# Patient Record
Sex: MALE | Race: WHITE | NOT HISPANIC OR LATINO | Employment: OTHER | URBAN - METROPOLITAN AREA
[De-identification: names, ages, dates, MRNs, and addresses within clinical notes are randomized per-mention and may not be internally consistent; named-entity substitution may affect disease eponyms.]

---

## 2018-06-01 PROBLEM — M19.90 ARTHRITIS: Status: ACTIVE | Noted: 2018-06-01

## 2018-06-01 PROBLEM — R97.20 ABNORMAL PROSTATE SPECIFIC ANTIGEN: Status: ACTIVE | Noted: 2018-06-01

## 2018-06-01 PROBLEM — H20.10 CHRONIC ANTERIOR UVEITIS: Status: ACTIVE | Noted: 2018-06-01

## 2018-06-01 PROBLEM — R73.03 BORDERLINE DIABETES: Status: ACTIVE | Noted: 2018-06-01

## 2018-06-08 ENCOUNTER — OFFICE VISIT (OUTPATIENT)
Dept: SURGERY | Facility: CLINIC | Age: 83
End: 2018-06-08

## 2018-06-08 VITALS
OXYGEN SATURATION: 98 % | HEART RATE: 71 BPM | HEIGHT: 69 IN | DIASTOLIC BLOOD PRESSURE: 85 MMHG | SYSTOLIC BLOOD PRESSURE: 145 MMHG | BODY MASS INDEX: 22.22 KG/M2 | TEMPERATURE: 97.6 F | WEIGHT: 150 LBS

## 2018-06-08 DIAGNOSIS — Z86.010 HISTORY OF COLON POLYPS: ICD-10-CM

## 2018-06-08 DIAGNOSIS — R19.4 CHANGE IN BOWEL HABITS: Primary | ICD-10-CM

## 2018-06-08 PROCEDURE — 99204 OFFICE O/P NEW MOD 45 MIN: CPT | Performed by: COLON & RECTAL SURGERY

## 2018-06-08 RX ORDER — SODIUM CHLORIDE, SODIUM LACTATE, POTASSIUM CHLORIDE, CALCIUM CHLORIDE 600; 310; 30; 20 MG/100ML; MG/100ML; MG/100ML; MG/100ML
30 INJECTION, SOLUTION INTRAVENOUS CONTINUOUS
Status: CANCELLED | OUTPATIENT
Start: 2018-07-05

## 2018-06-08 NOTE — PROGRESS NOTES
Steffen Leyva is a 83 y.o. male who is seen as a consult for Bowel habits change.      HPI:    Pt states in the past year, he has had difficulty passing BMs  He feels that there is a growing obstruction in the lower part of his colon  Symptoms got bad a year ago  He had hard stools, and struggled to pass them  BMs were regular    Then he took 1 stool softener, which caused diarrhea  This resolved    For the past couple of months, he has been avoiding lactose  Takes lactaid if he knows he is going to have dairy  Bowel movements much better    He does not noted extruding anal tissue    No bright red bleeding  Occasional dark stools    He does not take any fiber supplements or probiotics    Prior Alejandre RBL 2006    Most recent colonoscopy 12/7/2005 Dr. Owen Wheatley: ascending tubular adenoma    Hx AFib: he states he avoids caffeine and sugar to manage symptoms  He has seen Iain Carey MD    Current PCP Gregorio Mejía in [place], [place]  He spends 5.5 months of the year in [place]    Past Medical History:   Diagnosis Date   • Arthritis    • Atrial fibrillation    • Cataract    • Corneal abrasion, left 07/23/2015    SEEN AT [facility] ER   • Elevated PSA 07/2015   • Glaucoma    • IGT (impaired glucose tolerance) 07/2015       Past Surgical History:   Procedure Laterality Date   • COLONOSCOPY N/A 1994    NO RECORDS, OLD CHART NOT AVAILABLE, DR. DAHIANA ALEJANDRE   • COLONOSCOPY N/A 12/07/2005    RIGHT COLON LESION, PATH: TUBULAR ADENOMA, SIGMOID DIVERTICULOSIS, DR. JENNY WHEATLEY AT [facility]   • EYE SURGERY Bilateral 2012    RELIEF OF ELEVATED INTRAOCULAR PRESSURE, PERFORMED IN [facility]   • HEMORRHOID BANDING N/A 01/19/2006    DR. DAHIANA ALEJANDRE   • HEMORRHOID BANDING N/A 1980    NO RECORD AVAILABLE   • KNEE ARTHROPLASTY Right 2013    D/T MENISCUS TEAR, DR. SANTACRUZ IN PT [place], [place]   • KNEE ARTHROSCOPY Left 1980       Social History:   reports that he has quit smoking. His smoking use included Cigarettes. He has a 29.00  pack-year smoking history. He has never used smokeless tobacco. He reports that he drinks alcohol. He reports that he does not use drugs.      Marriage status:     Family History   Problem Relation Age of Onset   • Heart disease Father    • Heart disease Brother    • Aortic aneurysm Mother    • Glaucoma Mother          Current Outpatient Prescriptions:   •  aspirin 81 MG tablet, Take 81 mg by mouth daily., Disp: , Rfl:   •  timolol (BETIMOL) 0.5 % ophthalmic solution, Apply  to eye 2 (two) times a day., Disp: , Rfl:     Allergy  Prednisolone    Review of Systems   HENT: Positive for hearing loss.    Respiratory: Positive for snoring.    Skin: Positive for skin cancer.   Musculoskeletal: Positive for arthritis.   Gastrointestinal: Positive for change in bowel habit.   Neurological: Positive for excessive daytime sleepiness.   All other systems reviewed and are negative.      Vitals:    06/08/18 0914   BP: 145/85   Pulse: 71   Temp: 97.6 °F (36.4 °C)   SpO2: 98%     Body mass index is 22.15 kg/m².    Physical Exam   Constitutional: He is oriented to person, place, and time. He appears well-developed and well-nourished. No distress.   HENT:   Head: Normocephalic and atraumatic.   Nose: Nose normal.   Mouth/Throat: Oropharynx is clear and moist.   Eyes: Conjunctivae and EOM are normal. Pupils are equal, round, and reactive to light.   Neck: Normal range of motion. No tracheal deviation present.   Pulmonary/Chest: Effort normal and breath sounds normal. No respiratory distress.   Abdominal: Soft. Bowel sounds are normal. He exhibits no distension.   Musculoskeletal: Normal range of motion. He exhibits no edema or deformity.   Neurological: He is alert and oriented to person, place, and time. No cranial nerve deficit. Coordination and gait normal.   Skin: Skin is warm and dry.   Psychiatric: He has a normal mood and affect. His behavior is normal. Judgment normal.         Assessment:  1. Change in bowel habits     2. History of colon polyps        Plan:      For the change in bowel habits and history of colon polyps, I recommend colonoscopy .  I described risk, benefits and alternatives to the patient.  I described to patient typical post procedure recovery. The patient wishes to proceed.      Scribed for Cathi Coto MD by Pau John PA-C 6/8/2018  This patient was evaluated by me, recommendations made, documentation reviewed, edited, and revised by me, Cathi Coto MD

## 2018-07-05 ENCOUNTER — ANESTHESIA (OUTPATIENT)
Dept: GASTROENTEROLOGY | Facility: HOSPITAL | Age: 83
End: 2018-07-05

## 2018-07-05 ENCOUNTER — HOSPITAL ENCOUNTER (OUTPATIENT)
Facility: HOSPITAL | Age: 83
Setting detail: HOSPITAL OUTPATIENT SURGERY
Discharge: HOME | End: 2018-07-05
Attending: COLON & RECTAL SURGERY | Admitting: COLON & RECTAL SURGERY

## 2018-07-05 ENCOUNTER — ANESTHESIA EVENT (OUTPATIENT)
Dept: GASTROENTEROLOGY | Facility: HOSPITAL | Age: 83
End: 2018-07-05

## 2018-07-05 VITALS
WEIGHT: 145.31 LBS | HEART RATE: 58 BPM | BODY MASS INDEX: 21.52 KG/M2 | SYSTOLIC BLOOD PRESSURE: 101 MMHG | RESPIRATION RATE: 16 BRPM | OXYGEN SATURATION: 100 % | DIASTOLIC BLOOD PRESSURE: 75 MMHG | TEMPERATURE: 98.4 F | HEIGHT: 69 IN

## 2018-07-05 DIAGNOSIS — R19.4 CHANGE IN BOWEL HABITS: ICD-10-CM

## 2018-07-05 PROCEDURE — 25010000002 PROPOFOL 1000 MG/ML EMULSION: Performed by: ANESTHESIOLOGY

## 2018-07-05 PROCEDURE — 25010000002 PROPOFOL 10 MG/ML EMULSION: Performed by: ANESTHESIOLOGY

## 2018-07-05 PROCEDURE — 88305 TISSUE EXAM BY PATHOLOGIST: CPT | Performed by: COLON & RECTAL SURGERY

## 2018-07-05 PROCEDURE — 45381 COLONOSCOPY SUBMUCOUS NJX: CPT | Performed by: COLON & RECTAL SURGERY

## 2018-07-05 PROCEDURE — 45385 COLONOSCOPY W/LESION REMOVAL: CPT | Performed by: COLON & RECTAL SURGERY

## 2018-07-05 DEVICE — DEV CLIP ENDO RESOLUTION360 CONTRL ROT 235CM: Type: IMPLANTABLE DEVICE | Site: TRANSVERSE COLON | Status: FUNCTIONAL

## 2018-07-05 RX ORDER — PROPOFOL 10 MG/ML
VIAL (ML) INTRAVENOUS AS NEEDED
Status: DISCONTINUED | OUTPATIENT
Start: 2018-07-05 | End: 2018-07-05 | Stop reason: SURG

## 2018-07-05 RX ORDER — LIDOCAINE HYDROCHLORIDE 20 MG/ML
INJECTION, SOLUTION INFILTRATION; PERINEURAL AS NEEDED
Status: DISCONTINUED | OUTPATIENT
Start: 2018-07-05 | End: 2018-07-05 | Stop reason: SURG

## 2018-07-05 RX ORDER — SODIUM CHLORIDE, SODIUM LACTATE, POTASSIUM CHLORIDE, CALCIUM CHLORIDE 600; 310; 30; 20 MG/100ML; MG/100ML; MG/100ML; MG/100ML
1000 INJECTION, SOLUTION INTRAVENOUS CONTINUOUS
Status: DISCONTINUED | OUTPATIENT
Start: 2018-07-05 | End: 2018-07-05 | Stop reason: HOSPADM

## 2018-07-05 RX ORDER — SODIUM CHLORIDE, SODIUM LACTATE, POTASSIUM CHLORIDE, CALCIUM CHLORIDE 600; 310; 30; 20 MG/100ML; MG/100ML; MG/100ML; MG/100ML
30 INJECTION, SOLUTION INTRAVENOUS CONTINUOUS
Status: DISCONTINUED | OUTPATIENT
Start: 2018-07-05 | End: 2018-07-05 | Stop reason: HOSPADM

## 2018-07-05 RX ADMIN — PROPOFOL 130 MG: 10 INJECTION, EMULSION INTRAVENOUS at 14:00

## 2018-07-05 RX ADMIN — PROPOFOL 140 MCG/KG/MIN: 10 INJECTION, EMULSION INTRAVENOUS at 14:00

## 2018-07-05 RX ADMIN — SODIUM CHLORIDE, POTASSIUM CHLORIDE, SODIUM LACTATE AND CALCIUM CHLORIDE 1000 ML: 600; 310; 30; 20 INJECTION, SOLUTION INTRAVENOUS at 13:01

## 2018-07-05 RX ADMIN — LIDOCAINE HYDROCHLORIDE 60 MG: 20 INJECTION, SOLUTION INFILTRATION; PERINEURAL at 14:00

## 2018-07-05 NOTE — H&P (VIEW-ONLY)
Steffen Leyva is a 83 y.o. male who is seen as a consult for Bowel habits change.      HPI:    Pt states in the past year, he has had difficulty passing BMs  He feels that there is a growing obstruction in the lower part of his colon  Symptoms got bad a year ago  He had hard stools, and struggled to pass them  BMs were regular    Then he took 1 stool softener, which caused diarrhea  This resolved    For the past couple of months, he has been avoiding lactose  Takes lactaid if he knows he is going to have dairy  Bowel movements much better    He does not noted extruding anal tissue    No bright red bleeding  Occasional dark stools    He does not take any fiber supplements or probiotics    Prior Alejandre RBL 2006    Most recent colonoscopy 12/7/2005 Dr. Owen Wheatley: ascending tubular adenoma    Hx AFib: he states he avoids caffeine and sugar to manage symptoms  He has seen Iain Carey MD    Current PCP Gregorio Mejía in [place], [place]  He spends 5.5 months of the year in [place]    Past Medical History:   Diagnosis Date   • Arthritis    • Atrial fibrillation    • Cataract    • Corneal abrasion, left 07/23/2015    SEEN AT [facility] ER   • Elevated PSA 07/2015   • Glaucoma    • IGT (impaired glucose tolerance) 07/2015       Past Surgical History:   Procedure Laterality Date   • COLONOSCOPY N/A 1994    NO RECORDS, OLD CHART NOT AVAILABLE, DR. DAHIANA ALEJANDRE   • COLONOSCOPY N/A 12/07/2005    RIGHT COLON LESION, PATH: TUBULAR ADENOMA, SIGMOID DIVERTICULOSIS, DR. JENNY WHEATLEY AT [facility]   • EYE SURGERY Bilateral 2012    RELIEF OF ELEVATED INTRAOCULAR PRESSURE, PERFORMED IN [facility]   • HEMORRHOID BANDING N/A 01/19/2006    DR. DAHIANA ALEJANDRE   • HEMORRHOID BANDING N/A 1980    NO RECORD AVAILABLE   • KNEE ARTHROPLASTY Right 2013    D/T MENISCUS TEAR, DR. SANTACRUZ IN PT [place], [place]   • KNEE ARTHROSCOPY Left 1980       Social History:   reports that he has quit smoking. His smoking use included Cigarettes. He has a 29.00  pack-year smoking history. He has never used smokeless tobacco. He reports that he drinks alcohol. He reports that he does not use drugs.      Marriage status:     Family History   Problem Relation Age of Onset   • Heart disease Father    • Heart disease Brother    • Aortic aneurysm Mother    • Glaucoma Mother          Current Outpatient Prescriptions:   •  aspirin 81 MG tablet, Take 81 mg by mouth daily., Disp: , Rfl:   •  timolol (BETIMOL) 0.5 % ophthalmic solution, Apply  to eye 2 (two) times a day., Disp: , Rfl:     Allergy  Prednisolone    Review of Systems   HENT: Positive for hearing loss.    Respiratory: Positive for snoring.    Skin: Positive for skin cancer.   Musculoskeletal: Positive for arthritis.   Gastrointestinal: Positive for change in bowel habit.   Neurological: Positive for excessive daytime sleepiness.   All other systems reviewed and are negative.      Vitals:    06/08/18 0914   BP: 145/85   Pulse: 71   Temp: 97.6 °F (36.4 °C)   SpO2: 98%     Body mass index is 22.15 kg/m².    Physical Exam   Constitutional: He is oriented to person, place, and time. He appears well-developed and well-nourished. No distress.   HENT:   Head: Normocephalic and atraumatic.   Nose: Nose normal.   Mouth/Throat: Oropharynx is clear and moist.   Eyes: Conjunctivae and EOM are normal. Pupils are equal, round, and reactive to light.   Neck: Normal range of motion. No tracheal deviation present.   Pulmonary/Chest: Effort normal and breath sounds normal. No respiratory distress.   Abdominal: Soft. Bowel sounds are normal. He exhibits no distension.   Musculoskeletal: Normal range of motion. He exhibits no edema or deformity.   Neurological: He is alert and oriented to person, place, and time. No cranial nerve deficit. Coordination and gait normal.   Skin: Skin is warm and dry.   Psychiatric: He has a normal mood and affect. His behavior is normal. Judgment normal.         Assessment:  1. Change in bowel habits     2. History of colon polyps        Plan:      For the change in bowel habits and history of colon polyps, I recommend colonoscopy .  I described risk, benefits and alternatives to the patient.  I described to patient typical post procedure recovery. The patient wishes to proceed.      Scribed for Cathi Coto MD by Pau John PA-C 6/8/2018  This patient was evaluated by me, recommendations made, documentation reviewed, edited, and revised by me, Cathi Coto MD

## 2018-07-05 NOTE — DISCHARGE INSTRUCTIONS
WHAT ARE DIVERTICULOSIS AND DIVERTICULITIS?  Many people have small pouches in their colons that bulge outward through weak spots, like an inner tube that pokes through weak places in a tire.  Each pouch is called a diverticulum.  The condition of having diverticula is DIVERTICULOSIS.  The condition becomes more common as people age.  About half of all people over the age of 60 have diverticulosis    When pouches become infected or inflamed, the condition is called DIVERTICULITIS.  This happens in 10% to 25% of people with diverticulosis.  Diverticulosis and diverticulitis are also called DIVERTICULAR DISEASE.     WHAT ARE THE SYMPTOMS?  Diverticulosis - Most people do not have any discomfort or symptoms.  However, symptoms may include mild cramps, bloating, and constipation.  Other diseases such as irritable bowel syndrome (IBS) and stomach ulcers cause similar problems, so these symptoms do not always mean a person has diverticulosis.  You should visit your doctor if you have these troubling symptoms.    Diverticulitis - The most common symptom is abdominal pain.  The most common sign is tenderness around the left side of the lower abdomen.  If infection is the cause, fever, nausea, vomiting, chills, cramping, and constipation may occur as well.  The severity depends on the extent of the infection and complications.    WHAT ARE THE COMPLICATIONS?  Diverticulitis can lead to bleeding, infections,perforations or tears, or blockages.  These complications always require treatment to prevent them from proggressing and causing serous illness.    Bleeding from a diverticula is a rare complication.  When this occurs, blood may appear in the toilet or in your stool.  Bleeding can be severe, but it may stop by itself and not require treatment.  Doctors believe bleeding diverticula are caused by a small blood vessel in a diverticulum that weakens and finally bursts.  If you have bleeding from the rectum, you should see  your doctor.  If the bleeding does not stop you may need surgery.    Abscess, Perforation, and Peritonitis - The infection causing diverticulitis often clears up after a few days of treatment with antibiotics.  If the condition gets worse, an abscess may form in the colon.  An abscess is an infected area with pus that may cause swelling and destroy tissue.  Sometimes the infected diverticula may develop small holes, called perforations.  These perforations allow pus to leak out of the colon into the abdominal area.  If the abscess is small and remains in the colon, it may clear up after treatment with antibiotics.  If not, the doctor may need to drain it.  A large abscess can become a serious problem if the infection leaks out and contaminates areas outside the colon.  Infection that spreads into the abdominal cavity is called peritonitis.  Peritonitis requires immediate surgery toclean the abdominal cavity and remove the damaged part of the colon.  Without surgery, peritonitis can be fatal.    FISTULA  A fistula is an abnormal connection of tissue between two organs or between an organ and the skin.  When damaged tissues come into contact with each other during infection, they sometimes stick together.  If they heal that way, a fistula forms.  When diverticulitis-related infection spreads through out the colon, the colon's tissue may stick to nearby tissues.  The organs usually involved are the bladder, small intestine, and skin.  The problem can be corrected with surgery to remove the fistula and affected part of the colon.    INTESTINAL OBSTRUCTION  The scarring caused by infection may cause partial or total blockage of the large intestine.  When this happens, the colon is unable to move bowel contents normally.  When the obstruction totally blocks the intestine, emergency surgery is necessary.  Partial blockage is not an emergency, so the surgery to correct it can be planned.    WHAT CAUSES DIVERTICULAR  DISEASE  Although not proven, the dominant theory is that a low-fiber diet is the main cause of diverticular disease.  The disease was first noticed in the [place] in the early 1900s.  At about the same time, processed foods were introduced into the American diet.  Many processed foods contain refined, low-fiber flour.  Unlike whole-wheat flour, refined flour has no wheat bran.    Diverticular disease is common in developed or industrialized countries-particularly the [place], [place], and [place]-where low-fiber diets are common.  The disease is rare in countries of [place], where people eat high-fiber vegetable diets.    Fiber is the part of fruits, vegetables, and whole grains that the body cannot digest.  Some fiber dissolves easily in water (soluble fiber).  It takes on a soft, jelly-like texture in the intestines.  Some fiber passes almost unchanged through the intestines (insoluble fiber).  Both kinds of fiber help make stools soft and easy to pass.  Fiber also prevents constipation.    Constipation makes the muscles strain to move stool that is too hard.  It is the main cause of increased pressure in the colon.  This excess pressure might cause the weak spots in the colon to bulge out and become diverticula.  Diverticulitis occurs when diverticula become infected or inflamed.  Doctors are not certain what causes the infection.  It may begin when stool or bacteria are caught in the diverticula.  An attack of diverticulitis can develop suddenly and without warning.    HOW DOES THE DOCTOR DIAGNOSE DIVERTICULAR DISEASE  The doctor asks about medical history, does a physical exam, and may perform one or more diagnostic tests.  Because most people do not have symptoms, diverticulosis is often found through tests ordered for another ailment.    When taking a medical history, the doctor may ask about bowel habits, symptoms, pain, diet, and medications.  The physical exam usually involves a  digital rectal exam.  To preform this test. The doctor inserts a gloved, lubricated finger into the rectum to detect tenderness, blockage, or blood.  The doctor may check stool for signs of bleeding and test blood for signs of infection.  The doctor may also order x-rays or other tests.    WHAT IS THE TREATMENT FOR DIVERTICULAR DISEASE  Increasing the amount of fiber in the diet may reduce symptoms of diverticulosis and prevent complications such as diverticulitis.  Fiber keeps stool soft and lowers pressure inside the colon so that bowel contents can move through easily.  The American Dietetic Association. Recommends 20 to 35 grams of fiber each day.  The doctor may also recommend taking a fiber product such as Citrucel or Metamucil once a dya.  These products are mixed water and provide about 2 to 3.5 grams of fiber per  Tablespoon, mixed with 8 ounces of water.    Avoidance of nuts, popcorn, and sunflower, pumpkin, aylin, and sesame seeds has been recommended by physicians out of fear that food particles could enter, block, or irritate the diverticula.  However, no scientific data support this treatment measure.  Eating a high-fiber diet is the only requirement highly emphasized across the medical literature.  Eliminating specific foods is not necessary.  The seeds in tomatoes, zucchini, cucumbers, strawberries, and raspberries, as well as poppy seeds, are generally considered harmless.  People differ in amounts and types of foods the can eat.  Decisions about diet should be made based on what works best for each person.  Keeping a food diary may help identify what foods may cause symptoms.    If cramps, bloating, and constipation are problems, the doctor may prescribe  Short course of pain medication.  However, many medications affect emptying of the colon, an undesirable side effect for people with diverticulosis.    DIVERTICULITIS  Treatment focuses on clearing up the infection and inflammation, resting the  colon, and preventing or minimizing complications.  An attack of diverticulitis without complications may respond to antibiotics within a few days if treated early.  To help the colon rest, the doctor may recommend bed rest and a liquid diet, along with a pain reliever.    An acute attack with severe pain or sever infection may require a hospital stay.  Most acute cases of diverticulitis are treated with antibiotics and a liquid diet.  The antibiotics are given by injection into a vein.  In some cases, however, surgery may be necessary.    WHEN IS SURGERY NECESSARY  If attacks are severe or frequent, the doctor may advise surgery.  The surgeon removes the affected part of the colon and joins the remaining sections.  This typed of surgery, called colon resection, aims to keep attacks from coming back and to prevent complications.  The doctor may also recommend surgery for complications of a fistula or intestinal obstruction.    If antibiotics do not correct an attack, emergency surgery may be required.  Other reasons for emergency surgery include a large abscess, perforation, peritonitis, or continued bleeding.    Emergency surgery usually involves 2 operations.  The first will clear the infected abdominal cavity and remove part of the colon.  Because infection and sometimes obstruction, it is not safe to rejoin the colon during the first operation.  Instead, the surgeon creates a temporary hole, or stoma, in the abdomen.  The end of the colon is connected to the hole, a procedure called a colostomy, to allow normal eating and bowel movements.  The stool goes into a bag attached to the opening in the abdomen.  In the second operation, the surgeon rejoins the ends of the colon.

## 2018-07-05 NOTE — ANESTHESIA PREPROCEDURE EVALUATION
Anesthesia Evaluation     Patient summary reviewed   NPO Solid Status: > 8 hours  NPO Liquid Status: > 6 hours           Airway   Mallampati: I  TM distance: >3 FB  Dental      Pulmonary    Cardiovascular     Rhythm: irregular  Rate: normal    (+) dysrhythmias,       Neuro/Psych  GI/Hepatic/Renal/Endo      Musculoskeletal     Abdominal    Substance History      OB/GYN          Other   (+) arthritis                     Anesthesia Plan    ASA 2     MAC   total IV anesthesia  Anesthetic plan and risks discussed with patient.

## 2018-07-05 NOTE — ANESTHESIA POSTPROCEDURE EVALUATION
"Patient: Steffen Leyva    Procedure Summary     Date:  07/05/18 Room / Location:  [facility] ENDOSCOPY 7 /  [facility]    Anesthesia Start:  1354 Anesthesia Stop:  1423    Procedure:  COLONOSCOPY to cecum with hot snare polypectomy and spot tattoo marker (N/A ) Diagnosis:       Change in bowel habits      (Change in bowel habits [R19.4])    Surgeon:  Cathi Coto MD Provider:  Anais Madrid MD    Anesthesia Type:  MAC ASA Status:  2          Anesthesia Type: MAC  Last vitals  BP   109/69 (07/05/18 1437)   Temp   36.9 °C (98.4 °F) (07/05/18 1252)   Pulse   51 (07/05/18 1437)   Resp   16 (07/05/18 1437)     SpO2   100 % (07/05/18 1437)     Post Anesthesia Care and Evaluation    Patient location during evaluation: bedside  Patient participation: complete - patient participated  Level of consciousness: awake and alert  Pain management: adequate  Airway patency: patent  Anesthetic complications: No anesthetic complications  PONV Status: none  Cardiovascular status: acceptable  Respiratory status: acceptable  Hydration status: acceptable    Comments: /69 (BP Location: Left arm, Patient Position: Lying)   Pulse 51   Temp 36.9 °C (98.4 °F)   Resp 16   Ht 175.3 cm (69\")   Wt 65.9 kg (145 lb 5 oz)   SpO2 100%   BMI 21.46 kg/m²         "

## 2018-07-06 LAB
CYTO UR: NORMAL
LAB AP CASE REPORT: NORMAL
PATH REPORT.FINAL DX SPEC: NORMAL
PATH REPORT.GROSS SPEC: NORMAL

## 2019-07-11 ENCOUNTER — OFFICE VISIT (OUTPATIENT)
Dept: ORTHOPEDIC SURGERY | Facility: CLINIC | Age: 84
End: 2019-07-11

## 2019-07-11 VITALS
BODY MASS INDEX: 21.33 KG/M2 | HEART RATE: 73 BPM | DIASTOLIC BLOOD PRESSURE: 80 MMHG | WEIGHT: 144 LBS | HEIGHT: 69 IN | SYSTOLIC BLOOD PRESSURE: 131 MMHG

## 2019-07-11 DIAGNOSIS — M25.561 RIGHT KNEE PAIN, UNSPECIFIED CHRONICITY: Primary | ICD-10-CM

## 2019-07-11 DIAGNOSIS — M17.11 PRIMARY OSTEOARTHRITIS OF RIGHT KNEE: ICD-10-CM

## 2019-07-11 PROCEDURE — 99203 OFFICE O/P NEW LOW 30 MIN: CPT | Performed by: ORTHOPAEDIC SURGERY

## 2019-07-11 PROCEDURE — 20610 DRAIN/INJ JOINT/BURSA W/O US: CPT | Performed by: ORTHOPAEDIC SURGERY

## 2019-07-11 PROCEDURE — 73562 X-RAY EXAM OF KNEE 3: CPT | Performed by: ORTHOPAEDIC SURGERY

## 2019-07-11 RX ORDER — TRIAMCINOLONE ACETONIDE 40 MG/ML
40 INJECTION, SUSPENSION INTRA-ARTICULAR; INTRAMUSCULAR
Status: COMPLETED | OUTPATIENT
Start: 2019-07-11 | End: 2019-07-11

## 2019-07-11 RX ORDER — LIDOCAINE HYDROCHLORIDE 10 MG/ML
4 INJECTION, SOLUTION EPIDURAL; INFILTRATION; INTRACAUDAL; PERINEURAL
Status: COMPLETED | OUTPATIENT
Start: 2019-07-11 | End: 2019-07-11

## 2019-07-11 RX ADMIN — LIDOCAINE HYDROCHLORIDE 4 ML: 10 INJECTION, SOLUTION EPIDURAL; INFILTRATION; INTRACAUDAL; PERINEURAL at 10:57

## 2019-07-11 RX ADMIN — TRIAMCINOLONE ACETONIDE 40 MG: 40 INJECTION, SUSPENSION INTRA-ARTICULAR; INTRAMUSCULAR at 10:57

## 2019-07-11 NOTE — PROGRESS NOTES
Subjective: Right knee pain     Patient ID: Steffen Leyva is a 84 y.o. male.    Chief Complaint:    History of Present Illness 84-year-old male was seen by me today for the first time regarding his right knee with symptomatic off and on for over 6 years but recently is giving him increasing pain discomfort particular getting in and out of chair and walking long distances.  He does like to play golf and has had some difficulty when playing golf with knee pain sensation the knee giving way.  No specific history of trauma.  He did have arthroscopic surgery when he was in [place] for the winter 7 years ago and that seemed to help but is again having increasing discomfort.  When he does take an anti-inflammatory in the form of Celebrex he gets excellent relief but has not taken anything on a regular basis.  He describes the pain on a constant basis 5 out of 10 with periods of exacerbation.       Social History     Occupational History   • Not on file   Tobacco Use   • Smoking status: Former Smoker     Packs/day: 1.00     Years: 29.00     Pack years: 29.00     Types: Cigarettes   • Smokeless tobacco: Never Used   Substance and Sexual Activity   • Alcohol use: Yes     Comment: DAILY   • Drug use: No   • Sexual activity: Defer     Partners: Female      Review of Systems      Past Medical History:   Diagnosis Date   • Arthritis    • Atrial fibrillation (CMS/HCC)    • Cataract    • Corneal abrasion, left 07/23/2015    SEEN AT [facility] ER   • Elevated PSA 07/2015   • Glaucoma    • IGT (impaired glucose tolerance) 07/2015   • Prostate cancer (CMS/HCC)      Past Surgical History:   Procedure Laterality Date   • COLONOSCOPY N/A 1994    NO RECORDS, OLD CHART NOT AVAILABLE, DR. DAHIANA CURTIS   • COLONOSCOPY N/A 12/07/2005    RIGHT COLON LESION, PATH: TUBULAR ADENOMA, SIGMOID DIVERTICULOSIS, DR. JENNY XIE AT [facility]   • COLONOSCOPY N/A 7/5/2018    Procedure: COLONOSCOPY to cecum with hot snare polypectomy with resolution clip x 1,  and spot tattoo marker;  Surgeon: Cathi Coto MD;  Location: [facility] ENDOSCOPY;  Service: Gastroenterology   • EYE SURGERY Bilateral 2012    RELIEF OF ELEVATED INTRAOCULAR PRESSURE, PERFORMED IN [facility]   • HEMORRHOID BANDING N/A 01/19/2006    DR. DAHIANA CURTIS   • HEMORRHOID BANDING N/A 1980    NO RECORD AVAILABLE   • KNEE ARTHROPLASTY Right 2013    D/T MENISCUS TEAR, DR. SANTACRUZ IN [place], [place]   • KNEE ARTHROSCOPY Left 1980     Family History   Problem Relation Age of Onset   • Heart disease Father    • Heart disease Brother    • Aortic aneurysm Mother    • Glaucoma Mother          Objective:  Vitals:    07/11/19 1028   BP: 131/80   Pulse: 73         07/11/19  1028   Weight: 65.3 kg (144 lb)     Body mass index is 21.27 kg/m².        Ortho Exam   AP lateral sunrise view of his right knee to evaluate his chief complaint shows tricompartmental arthritis with near bone-on-bone medially.  No acute changes noted.  No prior x-rays available for comparison.  He is alert and oriented x3.  Head is normocephalic.  Patient does have glaucoma in both eyes and has ocular drains for the glaucoma.  The right knee shows a boggy synovitis but there is no effusion.  He has 0 to 130 degrees of motion with mild patellofemoral crepitance mild pain with patellar compression but no subluxation.  Quad function 5/5.  Bilateral joint line tenderness but negative Erika's.  No instability at 0 90 degrees nor any instability at 0 30 degrees of varus valgus stressing.  His calf is nontender.  Is good distal pulses no motor or sensory deficit good capillary refill.  The skin is cool to touch.  He does take anti-inflammatories he tolerates it well it does get relief but again is not taking anything on a regular basis.    Assessment:        1. Right knee pain, unspecified chronicity    2. Primary osteoarthritis of right knee           Plan:  Over 20 minutes was spent face-to-face with the patient reviewing his x-rays,  his history and his physical findings.  He has had no real treatment of the knee prior to today's visit.  My recommendation is to try a cortisone injection in the office was accomplished after he agreed with 4 cc lidocaine 1 cc of Kenalog.  Postinjection instructions given to the patient.  I did advise him if he does not get complete relief he should try taking 2 Aleve a day.  Return to see me in 6 weeks to evaluate the effectiveness of the treatment plan recommended and the patient was in agreement    Large Joint Arthrocentesis: R knee  Date/Time: 7/11/2019 10:57 AM  Consent given by: patient  Site marked: site marked  Supporting Documentation  Indications: pain   Procedure Details  Location: knee - R knee  Needle size: 22 G  Approach: superior  Medications administered: 4 mL lidocaine PF 1% 1 %; 40 mg triamcinolone acetonide 40 MG/ML  Patient tolerance: patient tolerated the procedure well with no immediate complications            Work Status:    JANI query complete.    Orders:  Orders Placed This Encounter   Procedures   • Large Joint Arthrocentesis: R knee   • XR Knee 3 View Right       Medications:  No orders of the defined types were placed in this encounter.      Followup:  Return in about 6 weeks (around 8/22/2019).          Dictated utilizing Dragon dictation

## 2020-06-15 ENCOUNTER — OFFICE VISIT (OUTPATIENT)
Dept: GASTROENTEROLOGY | Facility: CLINIC | Age: 85
End: 2020-06-15

## 2020-06-15 VITALS — TEMPERATURE: 97.8 F | HEIGHT: 69 IN | BODY MASS INDEX: 22.22 KG/M2 | WEIGHT: 150 LBS

## 2020-06-15 DIAGNOSIS — K59.1 FUNCTIONAL DIARRHEA: ICD-10-CM

## 2020-06-15 DIAGNOSIS — R14.0 ABDOMINAL BLOATING: ICD-10-CM

## 2020-06-15 DIAGNOSIS — R19.4 CHANGE IN BOWEL HABITS: Primary | ICD-10-CM

## 2020-06-15 DIAGNOSIS — A09 DIARRHEA OF INFECTIOUS ORIGIN: ICD-10-CM

## 2020-06-15 DIAGNOSIS — D12.2 ADENOMATOUS POLYP OF ASCENDING COLON: ICD-10-CM

## 2020-06-15 PROCEDURE — 99204 OFFICE O/P NEW MOD 45 MIN: CPT | Performed by: INTERNAL MEDICINE

## 2020-06-15 NOTE — PROGRESS NOTES
Chief Complaint   Patient presents with   • Diarrhea       Steffen Leyva is a 85 y.o. male who presents with change in bowel habits history of colon polyp    Mr. Leyva's GI past medical history includes a 2 cm tubular adenoma with low-grade dysplasia removed from the ascending colon 2 years ago repeat colonoscopy was suggested this year    For about 6 to 9 months he has had loose stools explosive diarrhea with bloating and no abdominal pain  He has had no weight loss  He has had bright red blood per rectum and a feeling of incomplete evacuation at times    He has had no stool studies blood work or imaging      Past Medical History:   Diagnosis Date   • Arthritis    • Atrial fibrillation (CMS/HCC)    • Cataract    • Corneal abrasion, left 07/23/2015    SEEN AT [facility] ER   • Elevated PSA 07/2015   • Glaucoma    • IGT (impaired glucose tolerance) 07/2015   • Prostate cancer (CMS/HCC)     Prostate        Past Surgical History:   Procedure Laterality Date   • COLONOSCOPY N/A 1994    NO RECORDS, OLD CHART NOT AVAILABLE, DR. DAHIANA CURTIS   • COLONOSCOPY N/A 12/07/2005    RIGHT COLON LESION, PATH: TUBULAR ADENOMA, SIGMOID DIVERTICULOSIS, DR. JENNY XIE AT [facility]   • COLONOSCOPY N/A 7/5/2018    Procedure: COLONOSCOPY to cecum with hot snare polypectomy with resolution clip x 1, and spot tattoo marker;  Surgeon: Cathi Coto MD;  Location: [facility];  Service: Gastroenterology   • EYE SURGERY Bilateral 2012    RELIEF OF ELEVATED INTRAOCULAR PRESSURE, PERFORMED IN [facility]   • HEMORRHOID BANDING N/A 01/19/2006    DR. DAHIANA CURTIS   • HEMORRHOID BANDING N/A 1980    NO RECORD AVAILABLE   • KNEE ARTHROPLASTY Right 2013    D/T MENISCUS TEAR, DR. SANTACRUZ IN [place], [place]   • KNEE ARTHROSCOPY Left 1980         Current Outpatient Medications:   •  timolol (BETIMOL) 0.5 % ophthalmic solution, Apply  to eye 2 (two) times a day., Disp: , Rfl:     Allergies   Allergen Reactions   • Prednisolone Other (See  Comments)     High Eye pressure       Social History     Socioeconomic History   • Marital status:      Spouse name: Not on file   • Number of children: Not on file   • Years of education: Not on file   • Highest education level: Not on file   Tobacco Use   • Smoking status: Former Smoker     Packs/day: 1.00     Years: 29.00     Pack years: 29.00     Types: Cigarettes   • Smokeless tobacco: Never Used   Substance and Sexual Activity   • Alcohol use: Yes     Comment: DAILY   • Drug use: No   • Sexual activity: Defer     Partners: Female       Family History   Problem Relation Age of Onset   • Heart disease Father    • Heart disease Brother    • Aortic aneurysm Mother    • Glaucoma Mother        Review of Systems   Gastrointestinal: Positive for abdominal distention, blood in stool and diarrhea.   All other systems reviewed and are negative.      Vitals:    06/15/20 1304   Temp: 97.8 °F (36.6 °C)       Physical Exam   Constitutional: He is oriented to person, place, and time. He appears well-developed and well-nourished.   HENT:   Head: Normocephalic and atraumatic.   Eyes: Conjunctivae and EOM are normal.   Neck: Normal range of motion. No tracheal deviation present.   Cardiovascular: Normal rate and regular rhythm.   Pulmonary/Chest: Effort normal and breath sounds normal. No respiratory distress.   Abdominal: Soft. Bowel sounds are normal. He exhibits no distension and no mass. There is no tenderness. There is no rebound and no guarding.   Musculoskeletal: Normal range of motion.   Neurological: He is alert and oriented to person, place, and time.   Skin: Skin is warm and dry.   Psychiatric: He has a normal mood and affect. Judgment normal.   Nursing note and vitals reviewed.    Problem list    Change in bowel habits with diarrhea  Abdominal bloating and excess gas  Colon polyp removed 2 years ago large tubular adenoma  Incomplete evacuation      Assessment/Plan  CBC  CMP  Celiac profile  TSH  GI PCR to  include C. difficile  Schedule colonoscopy for surveillance and also for biopsies for microscopic colitis  Begin a probiotic    If above work-up is unrevealing consider testing for small bowel bacterial overgrowth

## 2020-06-16 LAB
ALBUMIN SERPL-MCNC: 4.7 G/DL (ref 3.5–5.2)
ALBUMIN/GLOB SERPL: 1.9 G/DL
ALP SERPL-CCNC: 85 U/L (ref 39–117)
ALT SERPL-CCNC: 11 U/L (ref 1–41)
AST SERPL-CCNC: 13 U/L (ref 1–40)
BASOPHILS # BLD AUTO: 0.04 10*3/MM3 (ref 0–0.2)
BASOPHILS NFR BLD AUTO: 0.9 % (ref 0–1.5)
BILIRUB SERPL-MCNC: 0.4 MG/DL (ref 0.2–1.2)
BUN SERPL-MCNC: 15 MG/DL (ref 8–23)
BUN/CREAT SERPL: 14.7 (ref 7–25)
CALCIUM SERPL-MCNC: 9.4 MG/DL (ref 8.6–10.5)
CHLORIDE SERPL-SCNC: 101 MMOL/L (ref 98–107)
CO2 SERPL-SCNC: 26.6 MMOL/L (ref 22–29)
CREAT SERPL-MCNC: 1.02 MG/DL (ref 0.76–1.27)
ENDOMYSIUM IGA SER QL: NEGATIVE
EOSINOPHIL # BLD AUTO: 0.23 10*3/MM3 (ref 0–0.4)
EOSINOPHIL NFR BLD AUTO: 4.9 % (ref 0.3–6.2)
ERYTHROCYTE [DISTWIDTH] IN BLOOD BY AUTOMATED COUNT: 12.4 % (ref 12.3–15.4)
GLIADIN PEPTIDE IGA SER-ACNC: 7 UNITS (ref 0–19)
GLIADIN PEPTIDE IGG SER-ACNC: 3 UNITS (ref 0–19)
GLOBULIN SER CALC-MCNC: 2.5 GM/DL
GLUCOSE SERPL-MCNC: 110 MG/DL (ref 65–99)
HCT VFR BLD AUTO: 42.3 % (ref 37.5–51)
HGB BLD-MCNC: 14 G/DL (ref 13–17.7)
IGA SERPL-MCNC: 442 MG/DL (ref 61–437)
IMM GRANULOCYTES # BLD AUTO: 0.01 10*3/MM3 (ref 0–0.05)
IMM GRANULOCYTES NFR BLD AUTO: 0.2 % (ref 0–0.5)
LYMPHOCYTES # BLD AUTO: 1.21 10*3/MM3 (ref 0.7–3.1)
LYMPHOCYTES NFR BLD AUTO: 25.7 % (ref 19.6–45.3)
MCH RBC QN AUTO: 31.6 PG (ref 26.6–33)
MCHC RBC AUTO-ENTMCNC: 33.1 G/DL (ref 31.5–35.7)
MCV RBC AUTO: 95.5 FL (ref 79–97)
MONOCYTES # BLD AUTO: 0.62 10*3/MM3 (ref 0.1–0.9)
MONOCYTES NFR BLD AUTO: 13.2 % (ref 5–12)
NEUTROPHILS # BLD AUTO: 2.59 10*3/MM3 (ref 1.7–7)
NEUTROPHILS NFR BLD AUTO: 55.1 % (ref 42.7–76)
NRBC BLD AUTO-RTO: 0 /100 WBC (ref 0–0.2)
PLATELET # BLD AUTO: 291 10*3/MM3 (ref 140–450)
POTASSIUM SERPL-SCNC: 4.6 MMOL/L (ref 3.5–5.2)
PROT SERPL-MCNC: 7.2 G/DL (ref 6–8.5)
RBC # BLD AUTO: 4.43 10*6/MM3 (ref 4.14–5.8)
SODIUM SERPL-SCNC: 136 MMOL/L (ref 136–145)
TSH SERPL DL<=0.005 MIU/L-ACNC: 1.73 UIU/ML (ref 0.27–4.2)
TTG IGA SER-ACNC: <2 U/ML (ref 0–3)
TTG IGG SER-ACNC: 11 U/ML (ref 0–5)
WBC # BLD AUTO: 4.7 10*3/MM3 (ref 3.4–10.8)

## 2020-06-20 ENCOUNTER — RESULTS ENCOUNTER (OUTPATIENT)
Dept: GASTROENTEROLOGY | Facility: CLINIC | Age: 85
End: 2020-06-20

## 2020-06-20 DIAGNOSIS — K59.1 FUNCTIONAL DIARRHEA: ICD-10-CM

## 2020-06-20 DIAGNOSIS — R19.4 CHANGE IN BOWEL HABITS: ICD-10-CM

## 2020-06-20 DIAGNOSIS — D12.2 ADENOMATOUS POLYP OF ASCENDING COLON: ICD-10-CM

## 2020-06-20 DIAGNOSIS — R14.0 ABDOMINAL BLOATING: ICD-10-CM

## 2020-06-22 ENCOUNTER — PREP FOR SURGERY (OUTPATIENT)
Dept: OTHER | Facility: HOSPITAL | Age: 85
End: 2020-06-22

## 2020-06-22 ENCOUNTER — TELEPHONE (OUTPATIENT)
Dept: GASTROENTEROLOGY | Facility: CLINIC | Age: 85
End: 2020-06-22

## 2020-06-22 DIAGNOSIS — K90.0 CELIAC DISEASE: Primary | ICD-10-CM

## 2020-06-22 NOTE — PROGRESS NOTES
Celiac antibody 1 abnormal the rest normal doubt celiac disease but please add EGD to his schedule colonoscopy to check for celiac disease

## 2020-06-22 NOTE — TELEPHONE ENCOUNTER
----- Message from Jose Francisco Rodriguez MD sent at 6/22/2020 10:28 AM EDT -----  Celiac antibody 1 abnormal the rest normal doubt celiac disease but please add EGD to his schedule colonoscopy to check for celiac disease

## 2020-07-01 ENCOUNTER — TELEPHONE (OUTPATIENT)
Dept: GASTROENTEROLOGY | Facility: CLINIC | Age: 85
End: 2020-07-01

## 2020-07-01 NOTE — TELEPHONE ENCOUNTER
----- Message from Jose Francisco Rodriguez MD sent at 6/16/2020  9:01 AM EDT -----  Blood tests normal

## 2020-07-06 ENCOUNTER — TRANSCRIBE ORDERS (OUTPATIENT)
Dept: SLEEP MEDICINE | Facility: HOSPITAL | Age: 85
End: 2020-07-06

## 2020-07-06 DIAGNOSIS — Z01.818 OTHER SPECIFIED PRE-OPERATIVE EXAMINATION: Primary | ICD-10-CM

## 2020-07-08 ENCOUNTER — LAB (OUTPATIENT)
Dept: LAB | Facility: HOSPITAL | Age: 85
End: 2020-07-08

## 2020-07-08 DIAGNOSIS — Z01.818 OTHER SPECIFIED PRE-OPERATIVE EXAMINATION: ICD-10-CM

## 2020-07-08 PROCEDURE — C9803 HOPD COVID-19 SPEC COLLECT: HCPCS

## 2020-07-08 PROCEDURE — U0004 COV-19 TEST NON-CDC HGH THRU: HCPCS

## 2020-07-09 LAB
REF LAB TEST METHOD: NORMAL
SARS-COV-2 RNA RESP QL NAA+PROBE: NOT DETECTED

## 2020-07-10 ENCOUNTER — ANESTHESIA EVENT (OUTPATIENT)
Dept: GASTROENTEROLOGY | Facility: HOSPITAL | Age: 85
End: 2020-07-10

## 2020-07-10 ENCOUNTER — HOSPITAL ENCOUNTER (OUTPATIENT)
Facility: HOSPITAL | Age: 85
Setting detail: HOSPITAL OUTPATIENT SURGERY
Discharge: HOME | End: 2020-07-10
Attending: INTERNAL MEDICINE | Admitting: INTERNAL MEDICINE

## 2020-07-10 ENCOUNTER — ANESTHESIA (OUTPATIENT)
Dept: GASTROENTEROLOGY | Facility: HOSPITAL | Age: 85
End: 2020-07-10

## 2020-07-10 VITALS
OXYGEN SATURATION: 97 % | TEMPERATURE: 97.8 F | SYSTOLIC BLOOD PRESSURE: 149 MMHG | WEIGHT: 145 LBS | DIASTOLIC BLOOD PRESSURE: 79 MMHG | BODY MASS INDEX: 21.48 KG/M2 | RESPIRATION RATE: 18 BRPM | HEART RATE: 64 BPM | HEIGHT: 69 IN

## 2020-07-10 DIAGNOSIS — R19.4 CHANGE IN BOWEL HABITS: ICD-10-CM

## 2020-07-10 DIAGNOSIS — D12.2 ADENOMATOUS POLYP OF ASCENDING COLON: ICD-10-CM

## 2020-07-10 DIAGNOSIS — R14.0 ABDOMINAL BLOATING: ICD-10-CM

## 2020-07-10 DIAGNOSIS — K59.1 FUNCTIONAL DIARRHEA: ICD-10-CM

## 2020-07-10 PROCEDURE — 88305 TISSUE EXAM BY PATHOLOGIST: CPT | Performed by: INTERNAL MEDICINE

## 2020-07-10 PROCEDURE — 45380 COLONOSCOPY AND BIOPSY: CPT | Performed by: INTERNAL MEDICINE

## 2020-07-10 PROCEDURE — 43239 EGD BIOPSY SINGLE/MULTIPLE: CPT | Performed by: INTERNAL MEDICINE

## 2020-07-10 PROCEDURE — 25010000002 PROPOFOL 10 MG/ML EMULSION: Performed by: ANESTHESIOLOGY

## 2020-07-10 RX ORDER — LIDOCAINE HYDROCHLORIDE 20 MG/ML
INJECTION, SOLUTION INFILTRATION; PERINEURAL AS NEEDED
Status: DISCONTINUED | OUTPATIENT
Start: 2020-07-10 | End: 2020-07-10 | Stop reason: SURG

## 2020-07-10 RX ORDER — PROPOFOL 10 MG/ML
VIAL (ML) INTRAVENOUS AS NEEDED
Status: DISCONTINUED | OUTPATIENT
Start: 2020-07-10 | End: 2020-07-10 | Stop reason: SURG

## 2020-07-10 RX ORDER — SODIUM CHLORIDE, SODIUM LACTATE, POTASSIUM CHLORIDE, CALCIUM CHLORIDE 600; 310; 30; 20 MG/100ML; MG/100ML; MG/100ML; MG/100ML
30 INJECTION, SOLUTION INTRAVENOUS CONTINUOUS PRN
Status: DISCONTINUED | OUTPATIENT
Start: 2020-07-10 | End: 2020-07-10 | Stop reason: HOSPADM

## 2020-07-10 RX ADMIN — PROPOFOL 300 MG: 10 INJECTION, EMULSION INTRAVENOUS at 10:27

## 2020-07-10 RX ADMIN — SODIUM CHLORIDE, POTASSIUM CHLORIDE, SODIUM LACTATE AND CALCIUM CHLORIDE 30 ML/HR: 600; 310; 30; 20 INJECTION, SOLUTION INTRAVENOUS at 10:11

## 2020-07-10 RX ADMIN — LIDOCAINE HYDROCHLORIDE 100 MG: 20 INJECTION, SOLUTION INFILTRATION; PERINEURAL at 10:27

## 2020-07-10 NOTE — ANESTHESIA POSTPROCEDURE EVALUATION
"Patient: Steffen Leyva    Procedure Summary     Date:  07/10/20 Room / Location:  [facility] ENDOSCOPY 7 / [facility] ENDOSCOPY    Anesthesia Start:  1027 Anesthesia Stop:  1052    Procedures:       COLONOSCOPY INTO CECUM AND NORMAL TI WITH BX (N/A )      ESOPHAGOGASTRODUODENOSCOPY WITH BX (N/A Esophagus) Diagnosis:       Change in bowel habits      Functional diarrhea      Adenomatous polyp of ascending colon      Abdominal bloating      (Change in bowel habits [R19.4])      (Functional diarrhea [K59.1])      (Adenomatous polyp of ascending colon [D12.2])      (Abdominal bloating [R14.0])    Surgeon:  Jose Francisco Rodriguez MD Provider:  Conner Gamble MD    Anesthesia Type:  MAC ASA Status:  2          Anesthesia Type: MAC    Vitals  Vitals Value Taken Time   /79 7/10/2020 11:11 AM   Temp     Pulse 64 7/10/2020 11:11 AM   Resp 18 7/10/2020 11:11 AM   SpO2 97 % 7/10/2020 11:11 AM           Post Anesthesia Care and Evaluation    Patient location during evaluation: bedside  Patient participation: complete - patient participated  Level of consciousness: awake and alert  Pain management: adequate  Airway patency: patent  Anesthetic complications: No anesthetic complications    Cardiovascular status: acceptable  Respiratory status: acceptable  Hydration status: acceptable    Comments: /79 (BP Location: Left arm, Patient Position: Lying)   Pulse 64   Temp 36.6 °C (97.8 °F) (Oral)   Resp 18   Ht 175.3 cm (69\")   Wt 65.8 kg (145 lb)   SpO2 97%   BMI 21.41 kg/m²       "

## 2020-07-10 NOTE — ANESTHESIA PREPROCEDURE EVALUATION
Anesthesia Evaluation     Patient summary reviewed   NPO Solid Status: > 8 hours  NPO Liquid Status: > 6 hours           Airway   Mallampati: I  TM distance: >3 FB  Dental      Pulmonary    Cardiovascular     Rhythm: irregular  Rate: normal    (+) dysrhythmias,       Neuro/Psych  GI/Hepatic/Renal/Endo      Musculoskeletal     Abdominal    Substance History      OB/GYN          Other   arthritis,                        Anesthesia Plan    ASA 2     MAC   total IV anesthesia    Anesthetic plan, all risks, benefits, and alternatives have been provided, discussed and informed consent has been obtained with: patient.

## 2020-07-13 LAB
CYTO UR: NORMAL
LAB AP CASE REPORT: NORMAL
LAB AP DIAGNOSIS COMMENT: NORMAL
PATH REPORT.FINAL DX SPEC: NORMAL
PATH REPORT.GROSS SPEC: NORMAL

## 2020-07-13 NOTE — PROGRESS NOTES
Path consistent with microscopic colitis  Start Entocort 3 mg tablets, 3 tablets p.o. every morning, #90, 1 refill  Office visit Jessie 4 weeks to discuss symptoms

## 2020-07-16 ENCOUNTER — TELEPHONE (OUTPATIENT)
Dept: GASTROENTEROLOGY | Facility: CLINIC | Age: 85
End: 2020-07-16

## 2020-07-16 RX ORDER — BUDESONIDE 3 MG/1
9 CAPSULE, COATED PELLETS ORAL DAILY
Qty: 90 CAPSULE | Refills: 1 | Status: SHIPPED | OUTPATIENT
Start: 2020-07-16 | End: 2020-10-14

## 2020-07-16 NOTE — TELEPHONE ENCOUNTER
"Pt has appt with BG 8/4/20 at 1130.    Called pt and advised of the note from Dr Rodriguez. Explained diagnosis to him and how/why we treat it. Advised will call in the Entocort (generic is budesonide) and he can keep his appt with Jessie on Aug 4th. Advised I see he has prednisolone listed as an allergy and the reaction is \"high eye pressure\". Pt verb understanding and states the medication caused eyes to be itchy. He states he has the pressure in his eyes under control now. He would like to move forward with budesonide.     Med e-scribed.   "

## 2020-07-16 NOTE — TELEPHONE ENCOUNTER
----- Message from Jose Francisco Rodriguez MD sent at 7/13/2020 12:12 PM EDT -----  Path consistent with microscopic colitis  Start Entocort 3 mg tablets, 3 tablets p.o. every morning, #90, 1 refill  Office visit Jessie 4 weeks to discuss symptoms

## 2020-08-03 ENCOUNTER — TELEPHONE (OUTPATIENT)
Dept: GASTROENTEROLOGY | Facility: CLINIC | Age: 85
End: 2020-08-03

## 2020-08-03 NOTE — TELEPHONE ENCOUNTER
----- Message from Karen Riddle CMA sent at 8/3/2020 11:10 AM EDT -----  Contact: [phone]  Patient called and states we changed his appointment from 08/04 to 08/19 and he will run out of his budesonide before the 19th and wanted to know if he needed a refill or just to use for the 30 days?  Please contact.

## 2020-08-03 NOTE — TELEPHONE ENCOUNTER
Called pt and advised he should refill the budesonide and continue the med at 3 tablets daily until then, he verb understanding.

## 2020-08-19 ENCOUNTER — OFFICE VISIT (OUTPATIENT)
Dept: GASTROENTEROLOGY | Facility: CLINIC | Age: 85
End: 2020-08-19

## 2020-08-19 VITALS
WEIGHT: 145 LBS | SYSTOLIC BLOOD PRESSURE: 140 MMHG | BODY MASS INDEX: 21.48 KG/M2 | DIASTOLIC BLOOD PRESSURE: 80 MMHG | HEIGHT: 69 IN

## 2020-08-19 DIAGNOSIS — K52.839 MICROSCOPIC COLITIS, UNSPECIFIED MICROSCOPIC COLITIS TYPE: Primary | ICD-10-CM

## 2020-08-19 PROCEDURE — 99442 PR PHYS/QHP TELEPHONE EVALUATION 11-20 MIN: CPT | Performed by: NURSE PRACTITIONER

## 2020-08-19 RX ORDER — BICALUTAMIDE 50 MG/1
50 TABLET, FILM COATED ORAL
COMMUNITY
Start: 2019-08-07 | End: 2022-06-27

## 2020-08-19 NOTE — PROGRESS NOTES
Chief Complaint   Patient presents with   • Microscopic colitis     HPI    You have chosen to receive care through a telephone visit. Do you consent to use a telephone visit for your medical care today? yes    Steffen Leyva is a  85 y.o. male here for a follow up visit for microscopic colitis.  This patient follows with Dr. Rodriguez, new to me.  Recently underwent endoscopic evaluation on 7/10/2020 which I reviewed as follows:    EGD with normal findings.  Colonoscopy with normal ileum, diverticulosis, tattoo seen in the ascending colon, post polypectomy scar found at tattoo site. Nonbleeding internal hemorrhoids.  Path positive for microscopic colitis and patient was started on Entocort.    Today he has been on Entocort 9mg/day for 30 days. BM 2 x day. No diarrhea. Still with mild urgency. No rectal bleeding.  Stable.  No abdominal pain.  He does report history of lactose intolerance.  He takes Gas-X as needed.    No nausea, vomiting, acid reflux/heartburn, or dysphagia.  His appetite is good.    Past Medical History:   Diagnosis Date   • Arthritis    • Atrial fibrillation (CMS/HCC)    • Cataract    • Corneal abrasion, left 07/23/2015    SEEN AT [facility] ER   • Elevated PSA 07/2015   • Glaucoma    • IGT (impaired glucose tolerance) 07/2015   • Prostate cancer (CMS/HCC)     Prostate        Past Surgical History:   Procedure Laterality Date   • COLONOSCOPY N/A 1994    NO RECORDS, OLD CHART NOT AVAILABLE, DR. DAHIANA CURTIS   • COLONOSCOPY N/A 12/07/2005    RIGHT COLON LESION, PATH: TUBULAR ADENOMA, SIGMOID DIVERTICULOSIS, DR. JENNY XIE AT [facility]   • COLONOSCOPY N/A 7/5/2018    Procedure: COLONOSCOPY to cecum with hot snare polypectomy with resolution clip x 1, and spot tattoo marker;  Surgeon: Cathi Coto MD;  Location: [facility] ENDOSCOPY;  Service: Gastroenterology   • COLONOSCOPY N/A 7/10/2020    Procedure: COLONOSCOPY INTO CECUM AND NORMAL TI WITH BX;  Surgeon: Jose Francisco Rodriguez MD;  Location: [facility] ENDOSCOPY;   Service: Gastroenterology;  Laterality: N/A;  PRE: DIARRHEA   POST: DIVERTICULOSIS, INK AND SCAR-POST POLYPECTOMY SITE, HEMORRHOIDS    • ENDOSCOPY N/A 7/10/2020    Procedure: ESOPHAGOGASTRODUODENOSCOPY WITH BX;  Surgeon: Jose Francisco Rodriguez MD;  Location: [facility] ENDOSCOPY;  Service: Gastroenterology;  Laterality: N/A;  PRE: POSITIVE CELIAC ANTIBODIES   POST: NORMAL   • EYE SURGERY Bilateral     RELIEF OF ELEVATED INTRAOCULAR PRESSURE, PERFORMED IN [facility] [place] [facility]   • HEMORRHOID BANDING N/A 2006    DR. DAHIANA CURTIS   • HEMORRHOID BANDING N/A     NO RECORD AVAILABLE   • KNEE ARTHROPLASTY Right     D/T MENISCUS TEAR, DR. SANTACRUZ IN [place], [place]   • KNEE ARTHROSCOPY Left        Scheduled Meds:  Outpatient Encounter Medications as of 2020   Medication Sig Dispense Refill   • bicalutamide (CASODEX) 50 MG chemo tablet 50 mg.     • Probiotic Product (PROBIOTIC PO) Take  by mouth Daily.     • timolol (BETIMOL) 0.5 % ophthalmic solution Apply  to eye 2 (two) times a day.     • Budesonide (ENTOCORT EC) 3 MG 24 hr capsule Take 3 capsules by mouth Daily for 90 days. 90 capsule 1     No facility-administered encounter medications on file as of 2020.        Continuous Infusions:  No current facility-administered medications for this visit.     PRN Meds:.    Allergies   Allergen Reactions   • Prednisolone Other (See Comments)     High Eye pressure       Social History     Socioeconomic History   • Marital status:      Spouse name: Not on file   • Number of children: Not on file   • Years of education: Not on file   • Highest education level: Not on file   Tobacco Use   • Smoking status: Former Smoker     Packs/day: 1.00     Years: 29.00     Pack years: 29.00     Types: Cigarettes     Last attempt to quit: 7/10/1985     Years since quittin.1   • Smokeless tobacco: Never Used   Substance and Sexual Activity   • Alcohol use: Yes     Comment: DAILY   • Drug use: No   • Sexual  activity: Defer     Partners: Female       Family History   Problem Relation Age of Onset   • Heart disease Father    • Heart disease Brother    • Aortic aneurysm Mother    • Glaucoma Mother        Review of Systems   Constitutional: Negative for activity change, appetite change, fatigue, fever and unexpected weight change.   HENT: Negative for trouble swallowing.    Respiratory: Negative for apnea, cough, choking, chest tightness, shortness of breath and wheezing.    Cardiovascular: Negative for chest pain, palpitations and leg swelling.   Gastrointestinal: Negative for abdominal distention, abdominal pain, anal bleeding, blood in stool, constipation, diarrhea, nausea, rectal pain and vomiting.       Vitals:    08/19/20 1112   BP: 140/80       Physical Exam   Constitutional: He is oriented to person, place, and time.   Neurological: He is alert and oriented to person, place, and time.   Psychiatric: He has a normal mood and affect.     No radiology results for the last 7 days    Steffen was seen today for microscopic colitis.    Diagnoses and all orders for this visit:    Microscopic colitis, unspecified microscopic colitis type    Assessment/plan    Pleasant 85 year old seen today in follow up recently dx with microscopic colitis following endoscopic evaluation. He has done very well on Entocort. Diarrhea as resolved. Moving forward recommend he taper budesonide as follows:  Decrease to Entocort 6mg/day for one month then decrease to 3mg/day for one month.  Okay to use Gas-X PRN.  Continue lactose free diet.    RTC 8 weeks to monitor to sx improvement.    (Telehealth visit/phone call 20 mins duration)

## 2020-08-24 ENCOUNTER — OFFICE VISIT (OUTPATIENT)
Dept: ORTHOPEDIC SURGERY | Facility: CLINIC | Age: 85
End: 2020-08-24

## 2020-08-24 ENCOUNTER — TELEPHONE (OUTPATIENT)
Dept: ORTHOPEDIC SURGERY | Facility: CLINIC | Age: 85
End: 2020-08-24

## 2020-08-24 VITALS — HEIGHT: 69 IN | BODY MASS INDEX: 21.48 KG/M2 | WEIGHT: 145 LBS

## 2020-08-24 DIAGNOSIS — M17.11 PRIMARY OSTEOARTHRITIS OF RIGHT KNEE: ICD-10-CM

## 2020-08-24 DIAGNOSIS — M25.561 RIGHT KNEE PAIN, UNSPECIFIED CHRONICITY: Primary | ICD-10-CM

## 2020-08-24 PROCEDURE — 20610 DRAIN/INJ JOINT/BURSA W/O US: CPT | Performed by: ORTHOPAEDIC SURGERY

## 2020-08-24 PROCEDURE — 99213 OFFICE O/P EST LOW 20 MIN: CPT | Performed by: ORTHOPAEDIC SURGERY

## 2020-08-24 RX ORDER — LIDOCAINE HYDROCHLORIDE 10 MG/ML
4 INJECTION, SOLUTION EPIDURAL; INFILTRATION; INTRACAUDAL; PERINEURAL
Status: COMPLETED | OUTPATIENT
Start: 2020-08-24 | End: 2020-08-24

## 2020-08-24 RX ORDER — TRIAMCINOLONE ACETONIDE 40 MG/ML
40 INJECTION, SUSPENSION INTRA-ARTICULAR; INTRAMUSCULAR
Status: COMPLETED | OUTPATIENT
Start: 2020-08-24 | End: 2020-08-24

## 2020-08-24 RX ADMIN — TRIAMCINOLONE ACETONIDE 40 MG: 40 INJECTION, SUSPENSION INTRA-ARTICULAR; INTRAMUSCULAR at 09:53

## 2020-08-24 RX ADMIN — LIDOCAINE HYDROCHLORIDE 4 ML: 10 INJECTION, SOLUTION EPIDURAL; INFILTRATION; INTRACAUDAL; PERINEURAL at 09:53

## 2020-08-24 NOTE — TELEPHONE ENCOUNTER
Patient calling leaving a voicemail asking if a knee brace was indicated per Dr. Dixon an OTC knee sleeve no thicker than an ace bandage would be his recommendation.    Message left for the patient.

## 2020-08-24 NOTE — PROGRESS NOTES
Subjective: Osteoarthritis right knee     Patient ID: Steffen Leyva is a 85 y.o. male.    Chief Complaint:    History of Present Illness 85-year male known to me returns once again with right knee symptomatic.  Was seen last July and had a cortisone injection did well until just recently.  He is not having any pain at rest and does not have any pain on level ground unless he walks long distances but uneven ground does have some sensation of the knee giving out.  He is otherwise doing well presents here today for possible cortisone injection or other recommendations for treatment.       Social History     Occupational History   • Not on file   Tobacco Use   • Smoking status: Former Smoker     Packs/day: 1.00     Years: 29.00     Pack years: 29.00     Types: Cigarettes     Last attempt to quit: 7/10/1985     Years since quittin.1   • Smokeless tobacco: Never Used   Substance and Sexual Activity   • Alcohol use: Yes     Comment: DAILY   • Drug use: No   • Sexual activity: Defer     Partners: Female      Review of Systems   Constitutional: Negative for chills, diaphoresis, fever and unexpected weight change.   HENT: Negative for hearing loss, nosebleeds, sore throat and tinnitus.    Eyes: Negative for pain and visual disturbance.   Respiratory: Negative for cough, shortness of breath and wheezing.    Cardiovascular: Negative for chest pain and palpitations.   Gastrointestinal: Negative for abdominal pain, diarrhea, nausea and vomiting.   Endocrine: Negative for cold intolerance, heat intolerance and polydipsia.   Genitourinary: Negative for difficulty urinating, dysuria and hematuria.   Musculoskeletal: Positive for arthralgias and myalgias. Negative for joint swelling.   Skin: Negative for rash and wound.   Allergic/Immunologic: Negative for environmental allergies.   Neurological: Negative for dizziness, syncope and numbness.   Hematological: Does not bruise/bleed easily.   Psychiatric/Behavioral: Negative for  dysphoric mood and sleep disturbance. The patient is not nervous/anxious.          Past Medical History:   Diagnosis Date   • Arthritis    • Atrial fibrillation (CMS/HCC)    • Cataract    • Corneal abrasion, left 07/23/2015    SEEN AT [facility] ER   • Elevated PSA 07/2015   • Glaucoma    • IGT (impaired glucose tolerance) 07/2015   • Prostate cancer (CMS/HCC)     Prostate      Past Surgical History:   Procedure Laterality Date   • COLONOSCOPY N/A 1994    NO RECORDS, OLD CHART NOT AVAILABLE, DR. DAHIANA CURTIS   • COLONOSCOPY N/A 12/07/2005    RIGHT COLON LESION, PATH: TUBULAR ADENOMA, SIGMOID DIVERTICULOSIS, DR. JENNY XIE AT [facility]   • COLONOSCOPY N/A 7/5/2018    Procedure: COLONOSCOPY to cecum with hot snare polypectomy with resolution clip x 1, and spot tattoo marker;  Surgeon: Cathi Coto MD;  Location: [facility] ENDOSCOPY;  Service: Gastroenterology   • COLONOSCOPY N/A 7/10/2020    Procedure: COLONOSCOPY INTO CECUM AND NORMAL TI WITH BX;  Surgeon: Jose Francisco Rodriguez MD;  [facility] END[facility];  Service: Gastroenterology;  Laterality: N/A;  PRE: DIARRHEA   POST: DIVERTICULOSIS, INK AND SCAR-POST POLYPECTOMY SITE, HEMORRHOIDS    • ENDOSCOPY N/A 7/10/2020    Procedure: ESOPHAGOGASTRODUODENOSCOPY WITH BX;  Surgeon: Jose Francisco Rodriguez MD;  Location: [facility] ENDOSCOPY;  Service: Gastroenterology;  Laterality: N/A;  PRE: POSITIVE CELIAC ANTIBODIES   POST: NORMAL   • EYE SURGERY Bilateral 2012    RELIEF OF ELEVATED INTRAOCULAR PRESSURE, PERFORMED IN [facility]   • HEMORRHOID BANDING N/A 01/19/2006    DR. DAHIANA CURTIS   • HEMORRHOID BANDING N/A 1980    NO RECORD AVAILABLE   • KNEE ARTHROPLASTY Right 2013    D/T MENISCUS TEAR, DR. SANTACRUZ IN [place], [place]   • KNEE ARTHROSCOPY Left 1980     Family History   Problem Relation Age of Onset   • Heart disease Father    • Heart disease Brother    • Aortic aneurysm Mother    • Glaucoma Mother          Objective:  There were no vitals filed for this visit.       08/24/20  0937   Weight: 65.8 kg (145 lb)     Body mass index is 21.41 kg/m².        Ortho Exam   He is alert and oriented x3.  He does have a mild effusion to the knee and range of motion is 0 to 125 degrees with crepitus but no instability.  Joint line tenderness but negative Erika's.  Quad function is probably 4 out of 5 as is hamstring function but his calf is nontender good distal pulses no motor or sensory deficit.  Good capillary refill skin is cool to touch.    Assessment:        1. Right knee pain, unspecified chronicity    2. Primary osteoarthritis of right knee           Plan: Reviewed treatment options with the patient.  Has had a good response to the cortisone injection given last July.  My recommendation is to repeat the cortisone injection today.  If it does not 5 or 6 months we can proceed with the gel injection.  He inquired about total knee replacement but I told him that if the injections whether cortisone or gel is helping I would not proceed with surgery unless he is having more pain that is one live with and is not at that point.  Return in a month if symptomatic we will obtain authorization for gel injection.  Answered all questions  Large Joint Arthrocentesis: R knee  Date/Time: 8/24/2020 9:53 AM  Consent given by: patient  Site marked: site marked  Timeout: Immediately prior to procedure a time out was called to verify the correct patient, procedure, equipment, support staff and site/side marked as required   Supporting Documentation  Indications: pain   Procedure Details  Location: knee - R knee  Preparation: Patient was prepped and draped in the usual sterile fashion  Needle size: 18 G  Approach: superior (LATERAL)  Medications administered: 4 mL lidocaine PF 1% 1 %; 40 mg triamcinolone acetonide 40 MG/ML  Aspirate amount: 12 mL  Aspirate: serous  Patient tolerance: patient tolerated the procedure well with no immediate complications                  Work Status:    JANI query  complete.    Orders:  Orders Placed This Encounter   Procedures   • Large Joint Arthrocentesis: R knee       Medications:  No orders of the defined types were placed in this encounter.      Followup:  Return in about 1 week (around 8/31/2020).          Dictated utilizing Dragon dictation

## 2020-09-09 ENCOUNTER — TRANSCRIBE ORDERS (OUTPATIENT)
Dept: ADMINISTRATIVE | Facility: HOSPITAL | Age: 85
End: 2020-09-09

## 2020-09-09 ENCOUNTER — LAB (OUTPATIENT)
Dept: LAB | Facility: HOSPITAL | Age: 85
End: 2020-09-09

## 2020-09-09 DIAGNOSIS — R21 RASH AND OTHER NONSPECIFIC SKIN ERUPTION: ICD-10-CM

## 2020-09-09 DIAGNOSIS — J31.0 CHRONIC RHINITIS: Primary | ICD-10-CM

## 2020-09-09 DIAGNOSIS — T78.1XXA OTHER ADVERSE FOOD REACTIONS, NOT ELSEWHERE CLASSIFIED, INITIAL ENCOUNTER: ICD-10-CM

## 2020-09-09 DIAGNOSIS — J31.0 CHRONIC RHINITIS: ICD-10-CM

## 2020-09-09 PROCEDURE — 36415 COLL VENOUS BLD VENIPUNCTURE: CPT

## 2020-09-09 PROCEDURE — 83516 IMMUNOASSAY NONANTIBODY: CPT | Performed by: ALLERGY & IMMUNOLOGY

## 2020-09-15 LAB — ALPHA GAL IGE: 1.15 KU/L

## 2020-10-21 ENCOUNTER — OFFICE VISIT (OUTPATIENT)
Dept: GASTROENTEROLOGY | Facility: CLINIC | Age: 85
End: 2020-10-21

## 2020-10-21 VITALS — BODY MASS INDEX: 21.77 KG/M2 | WEIGHT: 147 LBS | HEIGHT: 69 IN

## 2020-10-21 DIAGNOSIS — K52.839 MICROSCOPIC COLITIS, UNSPECIFIED MICROSCOPIC COLITIS TYPE: Primary | ICD-10-CM

## 2020-10-21 PROCEDURE — 99443 PR PHYS/QHP TELEPHONE EVALUATION 21-30 MIN: CPT | Performed by: NURSE PRACTITIONER

## 2020-10-21 NOTE — PROGRESS NOTES
Chief Complaint   Patient presents with   • Microscopic colitis     HPI    You have chosen to receive care through a telephone visit. Do you consent to use a telephone visit for your medical care today? yes    Steffen Leyva is a  85 y.o. male here for a follow up visit for microscopic colitis.  This patient follows with Dr. Rodriguez.  He was diagnosed with microscopic colitis in July started on Entocort.  He was instructed to taper Entocort on office visit in August due to improvement in symptoms.    Patient tapered off of budesonide 1 week ago.  He is having 1 formed bowel movement a day.  No abdominal pain, rectal pain, rectal bleeding.  He continues on probiotics daily.  He avoids lactose and red meat.    No nausea, vomiting, acid reflux/heartburn, or dysphagia.  His appetite is good.  His weight is stable.      Past Medical History:   Diagnosis Date   • Arthritis    • Atrial fibrillation (CMS/HCC)    • Cataract    • Corneal abrasion, left 07/23/2015    SEEN AT [facility] ER   • Elevated PSA 07/2015   • Glaucoma    • IGT (impaired glucose tolerance) 07/2015   • Prostate cancer (CMS/HCC)     Prostate        Past Surgical History:   Procedure Laterality Date   • COLONOSCOPY N/A 1994    NO RECORDS, OLD CHART NOT AVAILABLE, DR. DAHIANA CURTIS   • COLONOSCOPY N/A 12/07/2005    RIGHT COLON LESION, PATH: TUBULAR ADENOMA, SIGMOID DIVERTICULOSIS, DR. JENNY XIE AT [facility]   • COLONOSCOPY N/A 7/5/2018    Procedure: COLONOSCOPY to cecum with hot snare polypectomy with resolution clip x 1, and spot tattoo marker;  Surgeon: Cathi Coto MD;  Location: [facility] ENDOSCOPY;  Service: Gastroenterology   • COLONOSCOPY N/A 7/10/2020    Procedure: COLONOSCOPY INTO CECUM AND NORMAL TI WITH BX;  Surgeon: Jose Francisco Rodriguez MD;  Location: [facility] ENDOSCOPY;  Service: Gastroenterology;  Laterality: N/A;  PRE: DIARRHEA   POST: DIVERTICULOSIS, INK AND SCAR-POST POLYPECTOMY SITE, HEMORRHOIDS    • ENDOSCOPY N/A 7/10/2020    Procedure:  ESOPHAGOGASTRODUODENOSCOPY WITH BX;  Surgeon: Jose Francisco Rodriguez MD;  Location: [facility] ENDOSCOPY;  Service: Gastroenterology;  Laterality: N/A;  PRE: POSITIVE CELIAC ANTIBODIES   POST: NORMAL   • EYE SURGERY Bilateral     RELIEF OF ELEVATED INTRAOCULAR PRESSURE, PERFORMED IN [facility]   • HEMORRHOID BANDING N/A 2006    DR. DAHIANA CURTIS   • HEMORRHOID BANDING N/A     NO RECORD AVAILABLE   • KNEE ARTHROPLASTY Right     D/T MENISCUS TEAR, DR. SANTACRUZ IN [place], [place]   • KNEE ARTHROSCOPY Left        Scheduled Meds:  Outpatient Encounter Medications as of 10/21/2020   Medication Sig Dispense Refill   • Probiotic Product (PROBIOTIC PO) Take  by mouth Daily.     • timolol (BETIMOL) 0.5 % ophthalmic solution Apply  to eye 2 (two) times a day.     • bicalutamide (CASODEX) 50 MG chemo tablet 50 mg.       No facility-administered encounter medications on file as of 10/21/2020.        Continuous Infusions:No current facility-administered medications for this visit.       PRN Meds:.    Allergies   Allergen Reactions   • Prednisolone Unknown - High Severity     High Eye pressure       Social History     Socioeconomic History   • Marital status:      Spouse name: Not on file   • Number of children: Not on file   • Years of education: Not on file   • Highest education level: Not on file   Tobacco Use   • Smoking status: Former Smoker     Packs/day: 1.00     Years: 29.00     Pack years: 29.00     Types: Cigarettes     Quit date: 7/10/1985     Years since quittin.3   • Smokeless tobacco: Never Used   Substance and Sexual Activity   • Alcohol use: Yes     Comment: DAILY   • Drug use: No   • Sexual activity: Defer     Partners: Female       Family History   Problem Relation Age of Onset   • Heart disease Father    • Heart disease Brother    • Aortic aneurysm Mother    • Glaucoma Mother        Review of Systems   Constitutional: Negative for activity change, appetite change, fatigue,  fever and unexpected weight change.   HENT: Negative for trouble swallowing.    Respiratory: Negative for apnea, cough, choking, chest tightness, shortness of breath and wheezing.    Cardiovascular: Negative for chest pain, palpitations and leg swelling.   Gastrointestinal: Negative for abdominal distention, abdominal pain, anal bleeding, blood in stool, constipation, diarrhea, nausea, rectal pain and vomiting.       There were no vitals filed for this visit.    Physical Exam  Constitutional:       Appearance: He is well-developed.   Neurological:      Mental Status: He is oriented to person, place, and time.   Psychiatric:         Behavior: Behavior normal.       No radiology results for the last 7 days    Diagnoses and all orders for this visit:    1. Microscopic colitis, unspecified microscopic colitis type (Primary)    Assessment/plan    Pleasant 85-year-old male seen today via telehealth visit/phone call to follow-up on microscopic colitis tapered off of budesonide 1 week ago and is doing quite well.  I recommend he continue daily probiotics.  Continue to avoid known triggers of diarrhea that include lactose and red meat.  Avoid NSAIDs.  Patient getting ready to go to [place] for the winter.  I instructed the patient to call us if symptoms return otherwise we will see him back in April when he returns from [place].    (Telehealth visit/phone call 25 minutes duration.)

## 2021-03-29 ENCOUNTER — OFFICE VISIT (OUTPATIENT)
Dept: ORTHOPEDIC SURGERY | Facility: CLINIC | Age: 86
End: 2021-03-29

## 2021-03-29 VITALS — WEIGHT: 147 LBS | BODY MASS INDEX: 21.77 KG/M2 | HEIGHT: 69 IN

## 2021-03-29 DIAGNOSIS — M17.11 PRIMARY OSTEOARTHRITIS OF RIGHT KNEE: Primary | ICD-10-CM

## 2021-03-29 PROBLEM — C61 MALIGNANT NEOPLASM OF PROSTATE: Status: ACTIVE | Noted: 2021-03-29

## 2021-03-29 PROBLEM — H40.1190 PRIMARY OPEN ANGLE GLAUCOMA (POAG): Status: ACTIVE | Noted: 2021-03-29

## 2021-03-29 PROBLEM — C44.92 SQUAMOUS CELL CARCINOMA OF SKIN: Status: ACTIVE | Noted: 2021-03-29

## 2021-03-29 PROCEDURE — 99212 OFFICE O/P EST SF 10 MIN: CPT | Performed by: ORTHOPAEDIC SURGERY

## 2021-03-29 NOTE — PROGRESS NOTES
Subjective: Osteoarthritis right knee     Patient ID: Steffen Leyva is a 85 y.o. male.    Chief Complaint:    History of Present Illness 85-year-old male returns with the right knee again symptomatic.  The cortisone injection given last fall lasted for only about a month presents today for possible gel injection.  He also states her doctor in [place] where he does reside part-time his mention stem cell injections in the want to know whether I thought those were effective.       Social History     Occupational History   • Not on file   Tobacco Use   • Smoking status: Former Smoker     Packs/day: 1.00     Years: 29.00     Pack years: 29.00     Types: Cigarettes     Quit date: 7/10/1985     Years since quittin.7   • Smokeless tobacco: Never Used   Vaping Use   • Vaping Use: Never used   Substance and Sexual Activity   • Alcohol use: Yes     Comment: DAILY   • Drug use: No   • Sexual activity: Defer     Partners: Female      Review of Systems   Constitutional: Negative for chills, diaphoresis, fever and unexpected weight change.   HENT: Negative for hearing loss, nosebleeds, sore throat and tinnitus.    Eyes: Negative for pain and visual disturbance.   Respiratory: Negative for cough, shortness of breath and wheezing.    Cardiovascular: Negative for chest pain and palpitations.   Gastrointestinal: Negative for abdominal pain, diarrhea, nausea and vomiting.   Endocrine: Negative for cold intolerance, heat intolerance and polydipsia.   Genitourinary: Negative for difficulty urinating, dysuria and hematuria.   Musculoskeletal: Positive for arthralgias and myalgias. Negative for joint swelling.   Skin: Negative for rash and wound.   Allergic/Immunologic: Negative for environmental allergies.   Neurological: Negative for dizziness, syncope and numbness.   Hematological: Does not bruise/bleed easily.   Psychiatric/Behavioral: Negative for dysphoric mood and sleep disturbance. The patient is not nervous/anxious.           Past Medical History:   Diagnosis Date   • Arthritis    • Atrial fibrillation (CMS/HCC)    • Cataract    • Corneal abrasion, left 07/23/2015    SEEN AT [facility] ER   • Elevated PSA 07/2015   • Glaucoma    • IGT (impaired glucose tolerance) 07/2015   • Prostate cancer (CMS/HCC)     Prostate      Past Surgical History:   Procedure Laterality Date   • COLONOSCOPY N/A 1994    NO RECORDS, OLD CHART NOT AVAILABLE, DR. DAHIANA CURTIS   • COLONOSCOPY N/A 12/07/2005    RIGHT COLON LESION, PATH: TUBULAR ADENOMA, SIGMOID DIVERTICULOSIS, DR. JENNY XIE AT [facility]   • COLONOSCOPY N/A 7/5/2018    Procedure: COLONOSCOPY to cecum with hot snare polypectomy with resolution clip x 1, and spot tattoo marker;  Surgeon: Cathi Coto MD;  [facility];  Service: Gastroenterology   • COLONOSCOPY N/A 7/10/2020    Procedure: COLONOSCOPY INTO CECUM AND NORMAL TI WITH BX;  Surgeon: Jose Francisco Rodriguez MD;  [facility];  Service: Gastroenterology;  Laterality: N/A;  PRE: DIARRHEA   POST: DIVERTICULOSIS, INK AND SCAR-POST POLYPECTOMY SITE, HEMORRHOIDS    • ENDOSCOPY N/A 7/10/2020    Procedure: ESOPHAGOGASTRODUODENOSCOPY WITH BX;  Surgeon: Jose Francisco Rodriguez MD;  [facility];  Service: Gastroenterology;  Laterality: N/A;  PRE: POSITIVE CELIAC ANTIBODIES   POST: NORMAL   • EYE SURGERY Bilateral 2012    RELIEF OF ELEVATED INTRAOCULAR PRESSURE, PERFORMED IN [facility]   • HEMORRHOID BANDING N/A 01/19/2006    DR. DAHIANA CURTIS   • HEMORRHOID BANDING N/A 1980    NO RECORD AVAILABLE   • KNEE ARTHROPLASTY Right 2013    D/T MENISCUS TEAR, DR. SANTACRUZ IN [place], [place]   • KNEE ARTHROSCOPY Left 1980     Family History   Problem Relation Age of Onset   • Heart disease Father    • Heart disease Brother    • Aortic aneurysm Mother    • Glaucoma Mother          Objective:  There were no vitals filed for this visit.      03/29/21  1513   Weight: 66.7 kg (147 lb)     Body mass index is 21.71 kg/m².         Ortho Exam   Is alert and oriented x3.  Knee shows no swelling fusion erythema the skin is cool to touch.  He has 0 to 125 degrees of motion with marked crepitus and discomfort but no instability throughout the arc of motion.  Quad and hamstring function are 5/5 and the calf is nontender.  Good distal pulses.    Assessment:        1. Primary osteoarthritis of right knee           Plan: I recommend we try the gel injection so that order for the Monovisc has been placed.  As far as the stem cell I told him does not a lot of research to improve its effectiveness or efficacy but if he wants to pay for it I think that is an option of as opposed to surgery if the Monovisc does not work.  But he will return in 2 weeks for the Monovisc injection once approval has been obtained.  Answered all questions            Work Status:    JANI query complete.    Orders:  Orders Placed This Encounter   Procedures   • Visco Treatment       Medications:  No orders of the defined types were placed in this encounter.      Followup:  Return in about 2 weeks (around 4/12/2021).          Dictated utilizing Dragon dictation

## 2021-04-05 ENCOUNTER — TELEPHONE (OUTPATIENT)
Dept: ORTHOPEDIC SURGERY | Facility: CLINIC | Age: 86
End: 2021-04-05

## 2021-04-05 NOTE — TELEPHONE ENCOUNTER
Provider:  DR. JENELLE AGUILAR    Caller:  AMANDA ARRIOLA    Relationship to Patient: SELF    Phone Number:  [phone]    Reason for Call:  PATIENT SAYS HE GOT NOTICE FROM HIS INS CO THAT THEY WON'T PAY FOR GEL INJECTION. PLEASE ADVISE WHAT'S NEXT?     When was the patient last seen:  3/29/21

## 2021-04-22 NOTE — TELEPHONE ENCOUNTER
PATIENT IS CALLING IN TO CHECK ON STATUS OF THIS MESSAGE, STATES HE RECEIVED A PHONE CALL STATING THIS WAS APPROVED.       Caller# [phone]

## 2021-05-20 ENCOUNTER — CLINICAL SUPPORT (OUTPATIENT)
Dept: ORTHOPEDIC SURGERY | Facility: CLINIC | Age: 86
End: 2021-05-20

## 2021-05-20 DIAGNOSIS — M17.11 PRIMARY OSTEOARTHRITIS OF RIGHT KNEE: Primary | ICD-10-CM

## 2021-05-20 PROCEDURE — 20610 DRAIN/INJ JOINT/BURSA W/O US: CPT | Performed by: ORTHOPAEDIC SURGERY

## 2021-05-20 RX ORDER — TIMOLOL MALEATE 5 MG/ML
SOLUTION/ DROPS OPHTHALMIC
COMMUNITY
Start: 2021-04-29

## 2021-05-20 NOTE — PROGRESS NOTES
Subjective: Osteoarthritis right knee     Patient ID: Steffen Leyva is a 86 y.o. male.    Chief Complaint:    History of Present Illness 86-year-old male is seen for Durolane injection into the right knee       Social History     Occupational History   • Not on file   Tobacco Use   • Smoking status: Former Smoker     Packs/day: 1.00     Years: 29.00     Pack years: 29.00     Types: Cigarettes     Quit date: 7/10/1985     Years since quittin.8   • Smokeless tobacco: Never Used   Vaping Use   • Vaping Use: Never used   Substance and Sexual Activity   • Alcohol use: Yes     Comment: DAILY   • Drug use: No   • Sexual activity: Defer     Partners: Female      Review of Systems   Constitutional: Negative for chills, diaphoresis, fever and unexpected weight change.   HENT: Negative for hearing loss, nosebleeds, sore throat and tinnitus.    Eyes: Negative for pain and visual disturbance.   Respiratory: Negative for cough, shortness of breath and wheezing.    Cardiovascular: Negative for chest pain and palpitations.   Gastrointestinal: Negative for abdominal pain, diarrhea, nausea and vomiting.   Endocrine: Negative for cold intolerance, heat intolerance and polydipsia.   Genitourinary: Negative for difficulty urinating, dysuria and hematuria.   Musculoskeletal: Positive for arthralgias.   Skin: Negative for rash and wound.   Allergic/Immunologic: Negative for environmental allergies.   Neurological: Negative for dizziness, syncope and numbness.   Hematological: Does not bruise/bleed easily.   Psychiatric/Behavioral: Negative for dysphoric mood and sleep disturbance. The patient is not nervous/anxious.    All other systems reviewed and are negative.        Past Medical History:   Diagnosis Date   • Arthritis    • Atrial fibrillation (CMS/HCC)    • Cataract    • Corneal abrasion, left 2015    SEEN AT [facility] ER   • Elevated PSA 2015   • Glaucoma    • IGT (impaired glucose tolerance) 2015   • Prostate cancer  (CMS/[facility])     Prostate      Past Surgical History:   Procedure Laterality Date   • COLONOSCOPY N/A 1994    NO RECORDS, OLD CHART NOT AVAILABLE, DR. DAHIANA CURTIS   • COLONOSCOPY N/A 12/07/2005    RIGHT COLON LESION, PATH: TUBULAR ADENOMA, SIGMOID DIVERTICULOSIS, DR. JENNY XIE AT [facility]   • COLONOSCOPY N/A 7/5/2018    Procedure: COLONOSCOPY to cecum with hot snare polypectomy with resolution clip x 1, and spot tattoo marker;  Surgeon: Cathi Coto MD;  [facility];  Service: Gastroenterology   • COLONOSCOPY N/A 7/10/2020    Procedure: COLONOSCOPY INTO CECUM AND NORMAL TI WITH BX;  Surgeon: Jose Francisco Rodriguez MD;  [facility];  Service: Gastroenterology;  Laterality: N/A;  PRE: DIARRHEA   POST: DIVERTICULOSIS, INK AND SCAR-POST POLYPECTOMY SITE, HEMORRHOIDS    • ENDOSCOPY N/A 7/10/2020    Procedure: ESOPHAGOGASTRODUODENOSCOPY WITH BX;  Surgeon: Jose Francisco Rodriguez MD;  Location: [facility] ENDOSCOPY;  Service: Gastroenterology;  Laterality: N/A;  PRE: POSITIVE CELIAC ANTIBODIES   POST: NORMAL   • EYE SURGERY Bilateral 2012    RELIEF OF ELEVATED INTRAOCULAR PRESSURE, PERFORMED IN [facility] [place] [facility]   • HEMORRHOID BANDING N/A 01/19/2006    DR. DAHIANA CURTIS   • HEMORRHOID BANDING N/A 1980    NO RECORD AVAILABLE   • KNEE ARTHROPLASTY Right 2013    D/T MENISCUS TEAR, DR. SANTACRUZ IN [place], [place]   • KNEE ARTHROSCOPY Left 1980     Family History   Problem Relation Age of Onset   • Heart disease Father    • Heart disease Brother    • Aortic aneurysm Mother    • Glaucoma Mother          Objective:  There were no vitals filed for this visit.  There were no vitals filed for this visit.  There is no height or weight on file to calculate BMI.        Ortho Exam   Patient is alert and oriented x3.  6 mm injection was given to the right knee through the superolateral portal after sterile prep without complications tolerated well.  Postinjection instructions given to the  patient.    Assessment:        1. Primary osteoarthritis of right knee           Plan: Return to see me as needed.  Answered all questions      Large Joint Arthrocentesis: R knee  Date/Time: 5/20/2021 1:55 PM  Consent given by: patient  Site marked: site marked  Timeout: Immediately prior to procedure a time out was called to verify the correct patient, procedure, equipment, support staff and site/side marked as required   Supporting Documentation  Indications: pain   Procedure Details  Location: knee - R knee  Preparation: Patient was prepped and draped in the usual sterile fashion  Needle size: 22 G  Approach: superior  Medications administered: 60 mg Sodium Hyaluronate 60 MG/3ML  Patient tolerance: patient tolerated the procedure well with no immediate complications            Work Status:    JANI query complete.    Orders:  Orders Placed This Encounter   Procedures   • Large Joint Arthrocentesis: R knee       Medications:  No orders of the defined types were placed in this encounter.      Followup:  Return if symptoms worsen or fail to improve.          Dictated utilizing Dragon dictation

## 2022-06-27 ENCOUNTER — OFFICE VISIT (OUTPATIENT)
Dept: INTERNAL MEDICINE | Facility: CLINIC | Age: 87
End: 2022-06-27

## 2022-06-27 VITALS
TEMPERATURE: 97.1 F | OXYGEN SATURATION: 95 % | WEIGHT: 149 LBS | SYSTOLIC BLOOD PRESSURE: 144 MMHG | HEIGHT: 69 IN | HEART RATE: 64 BPM | DIASTOLIC BLOOD PRESSURE: 80 MMHG | BODY MASS INDEX: 22.07 KG/M2

## 2022-06-27 DIAGNOSIS — I10 ESSENTIAL HYPERTENSION: ICD-10-CM

## 2022-06-27 DIAGNOSIS — I48.0 PAROXYSMAL ATRIAL FIBRILLATION: Primary | ICD-10-CM

## 2022-06-27 PROCEDURE — 99204 OFFICE O/P NEW MOD 45 MIN: CPT | Performed by: STUDENT IN AN ORGANIZED HEALTH CARE EDUCATION/TRAINING PROGRAM

## 2022-06-27 RX ORDER — CELECOXIB 200 MG/1
200 CAPSULE ORAL DAILY
COMMUNITY

## 2022-06-27 RX ORDER — GLUCOSAM/CHONDRO/HERB 149/HYAL 750-100 MG
1 TABLET ORAL DAILY
COMMUNITY

## 2022-06-27 RX ORDER — AMLODIPINE BESYLATE 2.5 MG/1
TABLET ORAL
COMMUNITY
Start: 2022-04-05 | End: 2022-07-22

## 2022-06-27 RX ORDER — AZELASTINE 1 MG/ML
2 SPRAY, METERED NASAL 2 TIMES DAILY
COMMUNITY

## 2022-06-27 NOTE — PROGRESS NOTES
Bony Zhang D.O.  Internal Medicine  [facility]  [address], [address]  [place] [address]  [phone]      Chief Complaint  Establish Care    SUBJECTIVE    History of Present Illness    Steffen Leyva is a 87 y.o. male who presents to the office today as a new patient to establish care.   Lives in [place] for 6 months and [place] for 6 months (May-November). Primary care in [place] is Dr Gregorio Mejía with [facility]     Atrial fibrillation/HTN: Had history of this in 2016 but states it resolved until he noticed atrial fibrillation  again March 15 2022, he went to an urgent care in [place] and they started him on amlodipine 2.5 mg daily. He has since been checking his BP and heart rate  At home daily and has noticed episodes of atrial fibrillation several times at least by feeling his pulse beating irregularly in his wrist.   He has a follow up with cardiology at [facility] scheduled in 2 days. He states he has cut out alcohol and caffeine almost entirely since March 2022.     Allergies: takes astelin nasal spray as needed,   States he has had an allergy to alpha gal several years ago and follows with allergist Dr Bishop. He has not eaten meat since that time.     Arthritis: right knee, takes celecoxib 200 mg once daily as needed    Prostate cancer: diagnosed 2019 wikt intermediate-risk prostate cancer, PSA was 7.4 on March 5 2019, received radiation, Lupron and Casodex. Radiation oncologist was Dr Alexis Del Valle with [facility], urologist is Piotr Stiles at [facility]. Last PSA was 0.06 March 2022.     Glaucoma: follows with ophthalmologist, takes timolol drops       Allergies   Allergen Reactions   • Prednisolone Unknown - High Severity     High Eye pressure        Outpatient Medications Marked as Taking for the 6/27/22 encounter (Office Visit) with Bony Zhang, DO   Medication Sig Dispense Refill   • amLODIPine (NORVASC) 2.5 MG tablet TAKE 1 TABLET BY MOUTH EVERY DAY FOR 90 DAYS     •  azelastine (ASTELIN) 0.1 % nasal spray 2 sprays into the nostril(s) as directed by provider 2 (Two) Times a Day. Use in each nostril as directed     • celecoxib (CeleBREX) 200 MG capsule Take 200 mg by mouth Daily.     • metroNIDAZOLE (METROCREAM) 0.75 % cream      • Misc Natural Products (Glucosamine Chond Cmp Advanced) tablet Take  by mouth.     • Probiotic Product (PROBIOTIC PO) Take  by mouth Daily.     • timolol (TIMOPTIC) 0.5 % ophthalmic solution           Past Medical History:   Diagnosis Date   • Arthritis    • Atrial fibrillation (HCC)    • Cataract    • Corneal abrasion, left 07/23/2015    SEEN AT [facility] ER   • Elevated PSA 07/2015   • Glaucoma    • Hypertension    • IGT (impaired glucose tolerance) 07/2015   • Prostate cancer (HCC) 07/2019     Past Surgical History:   Procedure Laterality Date   • COLONOSCOPY N/A 1994    NO RECORDS, OLD CHART NOT AVAILABLE, DR. DAHIANA CURTIS   • COLONOSCOPY N/A 12/07/2005    RIGHT COLON LESION, PATH: TUBULAR ADENOMA, SIGMOID DIVERTICULOSIS, DR. JENNY XIE AT [facility]   • COLONOSCOPY N/A 7/5/2018    Procedure: COLONOSCOPY to cecum with hot snare polypectomy with resolution clip x 1, and spot tattoo marker;  Surgeon: Cathi Coto MD;  Location: [facility] ENDOSCOPY;  Service: Gastroenterology   • COLONOSCOPY N/A 7/10/2020    Procedure: COLONOSCOPY INTO CECUM AND NORMAL TI WITH BX;  Surgeon: Jose Francisco Rodriguez MD;  Location: [facility] ENDOSCOPY;  Service: Gastroenterology;  Laterality: N/A;  PRE: DIARRHEA   POST: DIVERTICULOSIS, INK AND SCAR-POST POLYPECTOMY SITE, HEMORRHOIDS    • ENDOSCOPY N/A 7/10/2020    Procedure: ESOPHAGOGASTRODUODENOSCOPY WITH BX;  Surgeon: Jose Francisco Rodriguez MD;  Location: [facility] ENDOSCOPY;  Service: Gastroenterology;  Laterality: N/A;  PRE: POSITIVE CELIAC ANTIBODIES   POST: NORMAL   • EYE SURGERY Bilateral 2012    RELIEF OF ELEVATED INTRAOCULAR PRESSURE, PERFORMED IN [facility]   • HEMORRHOID BANDING N/A 01/19/2006    DR. DAHIANA CURTIS   • HEMORRHOID  "BANDING N/A 1980    NO RECORD AVAILABLE   • KNEE ARTHROPLASTY Right 2013    D/T MENISCUS TEAR, DR. SANTACRUZ IN PT [place], [place]   • KNEE ARTHROSCOPY Left 1980     Family History   Problem Relation Age of Onset   • Aortic aneurysm Mother    • Glaucoma Mother    • Heart disease Father    • Heart disease Brother    • Alcohol abuse Brother    • No Known Problems Brother     reports that he quit smoking about 36 years ago. His smoking use included cigarettes. He has a 29.00 pack-year smoking history. He has never used smokeless tobacco. He reports previous alcohol use. He reports that he does not use drugs.    OBJECTIVE    Vital Signs:   /80   Pulse 64   Temp 97.1 °F (36.2 °C) (Temporal)   Ht 175.3 cm (69\")   Wt 67.6 kg (149 lb)   SpO2 95%   BMI 22.00 kg/m²     Physical Exam  Vitals reviewed.   Constitutional:       General: He is not in acute distress.     Appearance: Normal appearance. He is normal weight. He is not ill-appearing.   HENT:      Head: Atraumatic.   Cardiovascular:      Rate and Rhythm: Normal rate and regular rhythm.      Heart sounds: Normal heart sounds. No murmur heard.  Pulmonary:      Effort: Pulmonary effort is normal. No respiratory distress.      Breath sounds: Normal breath sounds. No wheezing.   Musculoskeletal:      Right lower leg: No edema.      Left lower leg: No edema.   Neurological:      Mental Status: He is alert.   Psychiatric:         Mood and Affect: Mood normal.         Behavior: Behavior normal.         Thought Content: Thought content normal.                             ASSESSMENT & PLAN     Diagnoses and all orders for this visit:    1. Paroxysmal atrial fibrillation (HCC) (Primary)  2. Essential hypertension  -per review of chart, patient had normal Holter monitor in 2016 as well as TTE which showed mild TR and LVEF 61% after presenting to [facility] Dr Carey for palpitations. I reviewed that progress note and it states the patient had episodes of atrial " fibrillation remotely 15 years prior. It does not appear that anticoagulation was ever started.  -pt most recently went to an urgent care in [place] in March 2022 after feeling palpitations and shows me an EKG that is normal sinus rhythm (have scanned into chart); he has occasionally felt this sensation again over the last month and brings a log of these events (which I have also scanned into chart); he states that he checks his pulse during these episodes and it is irregular  -he has cut back on caffeine and alcohol since March 2022   -his [id number] is 3 (age, HTN) and HASBLED is 2 (NSAID use and age).   -He has a follow up with cardiology at [facility] scheduled in 2 days. I will wait for the impression for his anticoagulation plan.   -In regards to HTN, he is on amlodipine 2.5 mg daily, BP is 144/80 in office which is overall acceptable for his age.   -I reviewed labs from 3/2022 which showed normal renal function  -on exam today he is rate controlled at 64        The following social determinates of health impact the patient's medical decision making: No social determinates of health were factored in to today's visit.     Follow Up  Return in about 4 weeks (around 7/25/2022) for Medicare Wellness.    Patient/family had no further questions at this time and verbalized understanding of the plan discussed today.

## 2022-06-30 ENCOUNTER — OFFICE VISIT (OUTPATIENT)
Dept: CARDIOLOGY | Facility: CLINIC | Age: 87
End: 2022-06-30

## 2022-06-30 VITALS
DIASTOLIC BLOOD PRESSURE: 80 MMHG | BODY MASS INDEX: 22.07 KG/M2 | HEART RATE: 64 BPM | HEIGHT: 69 IN | WEIGHT: 149 LBS | SYSTOLIC BLOOD PRESSURE: 122 MMHG

## 2022-06-30 DIAGNOSIS — R00.2 PALPITATIONS: Primary | ICD-10-CM

## 2022-06-30 DIAGNOSIS — Z98.890 HISTORY OF HOLTER MONITORING: ICD-10-CM

## 2022-06-30 PROCEDURE — 99204 OFFICE O/P NEW MOD 45 MIN: CPT | Performed by: INTERNAL MEDICINE

## 2022-06-30 PROCEDURE — 93000 ELECTROCARDIOGRAM COMPLETE: CPT | Performed by: INTERNAL MEDICINE

## 2022-06-30 NOTE — PROGRESS NOTES
Subjective:     Encounter Date:06/30/2022      Patient ID: Steffen Leyva is a 87 y.o. male.    Chief Complaint:  History of Present Illness      Dear Dr. Welch,    I had the pleasure of seeing this patient in the office today for evaluation and consultation.  I have seen him once in the past in 2016.    Patient's been having recurrent symptoms of palpitations.  He refers to these as atrial fibrillation.  When I saw him in 2016 there was a questionable remote history of atrial fibrillation although we were never able to find any documentation of that.  At the time he told me he thought it was around 2000 when it occurred and it had been related to excess caffeine intake.  We did have him wear a Holter monitor that showed no significant arrhythmia.    Patient comes in today and states he has been having recurrent episodes of this rapid irregular heart rate.  He believes that its atrial fibrillation.  He was seen in the urgent care center in [place] (he spends about 6 months a year in [place]) but they were not able to document any atrial fibrillation.  Blood pressure was elevated so they started him on amlodipine.    He denies any chest pain or chest discomfort, no shortness of breath.  No palpitations or tachycardia, no presyncope or syncope.  He has not any orthopnea or PND.    Patient has no known history of coronary disease, congestive heart failure, rheumatic fever, rheumatic heart disease, or congenital heart disease.    The following portions of the patient's history were reviewed and updated as appropriate: allergies, current medications, past family history, past medical history, past social history, past surgical history and problem list.    Past Medical History:   Diagnosis Date   • Arthritis    • Atrial fibrillation (HCC)    • Cataract    • Corneal abrasion, left 07/23/2015    SEEN AT [facility] ER   • Elevated PSA 07/2015   • Glaucoma    • Hypertension    • IGT (impaired glucose tolerance) 07/2015   •  "Prostate cancer (HCC) 07/2019       Past Surgical History:   Procedure Laterality Date   • COLONOSCOPY N/A 1994    NO RECORDS, OLD CHART NOT AVAILABLE, DR. DAHIANA CURTIS   • COLONOSCOPY N/A 12/07/2005    RIGHT COLON LESION, PATH: TUBULAR ADENOMA, SIGMOID DIVERTICULOSIS, DR. JENNY XIE AT [facility]   • COLONOSCOPY N/A 7/5/2018    Procedure: COLONOSCOPY to cecum with hot snare polypectomy with resolution clip x 1, and spot tattoo marker;  Surgeon: Cathi Coto MD;  Location: [facility] ENDOSCOPY;  Service: Gastroenterology   • COLONOSCOPY N/A 7/10/2020    Procedure: COLONOSCOPY INTO CECUM AND NORMAL TI WITH BX;  Surgeon: Jose Francisco Rodriguez MD;  Location[facility] END[facility];  Service: Gastroenterology;  Laterality: N/A;  PRE: DIARRHEA   POST: DIVERTICULOSIS, INK AND SCAR-POST POLYPECTOMY SITE, HEMORRHOIDS    • ENDOSCOPY N/A 7/10/2020    Procedure: ESOPHAGOGASTRODUODENOSCOPY WITH BX;  Surgeon: Jose Francisco Rodriguez MD;  Location: [facility] ENDOSCOPY;  Service: Gastroenterology;  Laterality: N/A;  PRE: POSITIVE CELIAC ANTIBODIES   POST: NORMAL   • EYE SURGERY Bilateral 2012    RELIEF OF ELEVATED INTRAOCULAR PRESSURE, PERFORMED IN [facility]   • HEMORRHOID BANDING N/A 01/19/2006    DR. DAHIANA CURTIS   • HEMORRHOID BANDING N/A 1980    NO RECORD AVAILABLE   • KNEE ARTHROPLASTY Right 2013    D/T MENISCUS TEAR, DR. SANTACRUZ IN [place], [place]   • KNEE ARTHROSCOPY Left 1980           ECG 12 Lead    Date/Time: 6/30/2022 9:27 AM  Performed by: Iain Carey III, MD  Authorized by: Iain Carey III, MD   Comparison: compared with previous ECG   Similar to previous ECG  Rhythm: sinus rhythm  Rate: normal  Conduction: conduction normal  ST Segments: ST segments normal  T Waves: T waves normal  QRS axis: normal  Other: no other findings    Clinical impression: normal ECG               Objective:     Vitals:    06/30/22 0858   BP: 122/80   Pulse: 64   Weight: 67.6 kg (149 lb)   Height: 175.3 cm (69\")         Physical " Exam  Constitutional:       General: He is not in acute distress.     Appearance: He is well-developed. He is not diaphoretic.   HENT:      Head: Normocephalic and atraumatic.      Nose: Nose normal.   Eyes:      General:         Right eye: No discharge.         Left eye: No discharge.      Conjunctiva/sclera: Conjunctivae normal.      Pupils: Pupils are equal, round, and reactive to light.   Neck:      Thyroid: No thyromegaly.      Trachea: No tracheal deviation.   Cardiovascular:      Rate and Rhythm: Normal rate and regular rhythm.      Pulses: Normal pulses.      Heart sounds: Normal heart sounds, S1 normal and S2 normal.     No S3 sounds.   Pulmonary:      Effort: Pulmonary effort is normal. No respiratory distress.      Breath sounds: Normal breath sounds. No stridor.   Chest:      Chest wall: No tenderness.   Abdominal:      General: Bowel sounds are normal. There is no distension.      Palpations: Abdomen is soft. There is no mass.      Tenderness: There is no abdominal tenderness. There is no guarding or rebound.   Musculoskeletal:         General: No tenderness or deformity. Normal range of motion.      Cervical back: Normal range of motion and neck supple.   Lymphadenopathy:      Cervical: No cervical adenopathy.   Skin:     General: Skin is warm and dry.      Findings: No erythema or rash.   Neurological:      Mental Status: He is alert and oriented to person, place, and time.      Deep Tendon Reflexes: Reflexes are normal and symmetric.   Psychiatric:         Thought Content: Thought content normal.       CHADS-VASc Risk Assessment            3 Total Score    1 Hypertension    2 Age >/= 75        Criteria that do not apply:    CHF    DM    PRIOR STROKE/TIA/THROMBO    Vascular Disease    Age 65-74    Sex: Female          Lab Review:               Results for orders placed during the hospital encounter of 07/20/16    Adult transthoracic echo complete    Interpretation Summary  · All left ventricular wall  segments contract normally.  · Left ventricular function is normal. Estimated EF = 61%.  · Mild tricuspid valve regurgitation is present.    Lab Results   Component Value Date    GLUCOSE 110 (H) 06/15/2020    BUN 15 06/15/2020    CREATININE 1.02 06/15/2020    EGFRIFNONA 69 06/15/2020    EGFRIFAFRI 84 06/15/2020    BCR 14.7 06/15/2020    K 4.6 06/15/2020    CO2 26.6 06/15/2020    CALCIUM 9.4 06/15/2020    PROTENTOTREF 7.2 06/15/2020    ALBUMIN 4.70 06/15/2020    LABIL2 1.9 06/15/2020    AST 13 06/15/2020    ALT 11 06/15/2020       Assessment:          Diagnosis Plan   1. Palpitations  Cardiac Event Monitor    ECG 12 Lead   2. History of Holter monitoring  Cardiac Event Monitor    ECG 12 Lead          Plan:       1.  Palpitations- potential A. fib.  He believes it to be A. fib but nothing is ever been documented.  Prior Holter monitor as well as EKGs have all demonstrated sinus rhythm.  Given the frequency of these have this we will set him up for a 14-day event monitor.  Further evaluation and treatment will be predicated on results.  2.  Hypertension-good control today, he is on amlodipine, no change made today to his regimen.    Thank you very much for allowing us to participate in the care of this pleasant patient.  Please don't hesitate to call if I can be of assistance in any way.      Current Outpatient Medications:   •  amLODIPine (NORVASC) 2.5 MG tablet, TAKE 1 TABLET BY MOUTH EVERY DAY FOR 90 DAYS, Disp: , Rfl:   •  azelastine (ASTELIN) 0.1 % nasal spray, 2 sprays into the nostril(s) as directed by provider 2 (Two) Times a Day. Use in each nostril as directed, Disp: , Rfl:   •  celecoxib (CeleBREX) 200 MG capsule, Take 200 mg by mouth Daily., Disp: , Rfl:   •  metroNIDAZOLE (METROCREAM) 0.75 % cream, , Disp: , Rfl:   •  Misc Natural Products (Glucosamine Chond Cmp Advanced) tablet, Take 1 tablet by mouth Daily., Disp: , Rfl:   •  Probiotic Product (PROBIOTIC PO), Take 1 tablet by mouth Daily., Disp: , Rfl:    •  timolol (TIMOPTIC) 0.5 % ophthalmic solution, , Disp: , Rfl:

## 2022-07-12 ENCOUNTER — DOCUMENTATION (OUTPATIENT)
Dept: CARDIOLOGY | Facility: CLINIC | Age: 87
End: 2022-07-12

## 2022-07-12 RX ORDER — METOPROLOL SUCCINATE 25 MG/1
25 TABLET, EXTENDED RELEASE ORAL DAILY
Qty: 30 TABLET | Refills: 11 | Status: SHIPPED | OUTPATIENT
Start: 2022-07-12 | End: 2022-07-22

## 2022-07-12 NOTE — PROGRESS NOTES
I have received preliminary communication on his monitor which demonstrates A. fib with RVR occurring on July 10 for perhaps an hour and a half and then for short period time on July 12, today.  Both were AFIB with heart rates up in the 160s.  I talked with him he did feel this.  He has other episodes that he was suspicious about but no A. fib was seen on the monitors on those episodes.  I had a lengthy discussion with him on this, we will get a start metoprolol succinate 25 mg, add Eliquis 5 mg twice daily, and continue to monitor.

## 2022-07-22 ENCOUNTER — TELEPHONE (OUTPATIENT)
Dept: CARDIOLOGY | Facility: CLINIC | Age: 87
End: 2022-07-22

## 2022-07-22 RX ORDER — METOPROLOL SUCCINATE 25 MG/1
25 TABLET, EXTENDED RELEASE ORAL 2 TIMES DAILY
Qty: 60 TABLET | Refills: 11 | Status: SHIPPED | OUTPATIENT
Start: 2022-07-22 | End: 2022-10-26

## 2022-07-22 NOTE — TELEPHONE ENCOUNTER
Pt 0feels like he is in A-Fib and was wanting to know what he should do.    His BP today was was 150/75 P:66    He was started on eliquis 5 MG BID and metoprolol on 7/12/22.    PT #: [phone]

## 2022-07-22 NOTE — TELEPHONE ENCOUNTER
"Viki,   Pt is taking Eliquis 5mg twice daily, Metoprolol 25mg daily, Amlodipine 2.5mg daily.    Pt states during his AF episodes, he has a \"feeling\" in his chest as well as weakness.  At 1:30pm today (during AF episode) /69, HR 99  At 2.30pm today afib had stopped.  /63, HR 63.    Pt also wants to know if increased sugar can cause afib?    Do you have any further recommendations?      Carina Yu RN  [facility] Triage Department         "

## 2022-07-22 NOTE — TELEPHONE ENCOUNTER
Stop amlodipine. Increase Toprol to 25 mg BID. There can be lots of triggers for Afib. High sugar is not a typical trigger but could be. More common would be caffeine, alcohol, tobacco, poor sleep quality, stress.     Please get him scheduled to see Dr. Carey or Chirag at [place] in 2-3 weeks.     Thanks!  CAHN Coulter

## 2022-07-22 NOTE — TELEPHONE ENCOUNTER
Notified pt of results.    He verbalized understanding.  F/u appt made with Chirag for 2 wks     Carina Yu RN  [facility] [place]

## 2022-07-22 NOTE — TELEPHONE ENCOUNTER
Please call him and make sure he is taking 25 mg Toprol and 5 mg apixaban.   Is he having any symptoms when he feels he is Afib; what is his HR/ BP when he is in Afib.?    Thanks!  CHAN Coulter

## 2022-07-29 ENCOUNTER — OFFICE VISIT (OUTPATIENT)
Dept: INTERNAL MEDICINE | Facility: CLINIC | Age: 87
End: 2022-07-29

## 2022-07-29 VITALS
BODY MASS INDEX: 21.92 KG/M2 | DIASTOLIC BLOOD PRESSURE: 66 MMHG | SYSTOLIC BLOOD PRESSURE: 132 MMHG | HEIGHT: 69 IN | WEIGHT: 148 LBS | HEART RATE: 54 BPM | OXYGEN SATURATION: 97 %

## 2022-07-29 DIAGNOSIS — Z00.00 MEDICARE ANNUAL WELLNESS VISIT, SUBSEQUENT: Primary | ICD-10-CM

## 2022-07-29 DIAGNOSIS — H61.22 IMPACTED CERUMEN OF LEFT EAR: ICD-10-CM

## 2022-07-29 DIAGNOSIS — R53.83 FATIGUE, UNSPECIFIED TYPE: ICD-10-CM

## 2022-07-29 DIAGNOSIS — K64.9 HEMORRHOIDS, UNSPECIFIED HEMORRHOID TYPE: ICD-10-CM

## 2022-07-29 DIAGNOSIS — Z23 NEED FOR PNEUMOCOCCAL VACCINATION: ICD-10-CM

## 2022-07-29 PROCEDURE — 99214 OFFICE O/P EST MOD 30 MIN: CPT | Performed by: STUDENT IN AN ORGANIZED HEALTH CARE EDUCATION/TRAINING PROGRAM

## 2022-07-29 PROCEDURE — G0439 PPPS, SUBSEQ VISIT: HCPCS | Performed by: STUDENT IN AN ORGANIZED HEALTH CARE EDUCATION/TRAINING PROGRAM

## 2022-07-29 PROCEDURE — G0009 ADMIN PNEUMOCOCCAL VACCINE: HCPCS | Performed by: STUDENT IN AN ORGANIZED HEALTH CARE EDUCATION/TRAINING PROGRAM

## 2022-07-29 PROCEDURE — 90677 PCV20 VACCINE IM: CPT | Performed by: STUDENT IN AN ORGANIZED HEALTH CARE EDUCATION/TRAINING PROGRAM

## 2022-07-29 PROCEDURE — 1159F MED LIST DOCD IN RCRD: CPT | Performed by: STUDENT IN AN ORGANIZED HEALTH CARE EDUCATION/TRAINING PROGRAM

## 2022-07-29 PROCEDURE — 1170F FXNL STATUS ASSESSED: CPT | Performed by: STUDENT IN AN ORGANIZED HEALTH CARE EDUCATION/TRAINING PROGRAM

## 2022-07-29 NOTE — PROGRESS NOTES
"The ABCs of the Annual Wellness Visit  Subsequent Medicare Wellness Visit    Chief Complaint   Patient presents with   • Medicare Wellness-subsequent      Subjective    History of Present Illness:  Steffen Leyva is a 87 y.o. male who presents for a Subsequent Medicare Wellness Visit.    The following portions of the patient's history were reviewed and   updated as appropriate: allergies, current medications, past family history, past medical history, past social history, past surgical history and problem list.    Compared to one year ago, the patient feels his physical   health is worse. \"slight\"    States he is now being treated for atrial fibrillation with [facility].    States for 6 months he doesn't feel as energetic as he once did.   He does not wake up feeling energetic. He states that he does not sleep very well. He wakes up 3-4 times at night to urinate but goes right back to sleep.     Compared to one year ago, the patient feels his mental   health is the same.    Recent Hospitalizations:  He was not admitted to the hospital during the last year.       Current Medical Providers:  Patient Care Team:  Bony Zhang DO as PCP - General (Internal Medicine)  Iain Carey III, MD as Consulting Physician (Cardiology)  Dallas Gamboa MD as Consulting Physician (Dermatology)  Vinnie Stiles MD as Consulting Physician (Ophthalmology)  Alexis Del Valle MD (Oncology)  Juanito Bishop MD as Consulting Physician (Allergy and Immunology)  Jose Francisco Rodriguez MD as Consulting Physician (Gastroenterology)    Outpatient Medications Prior to Visit   Medication Sig Dispense Refill   • apixaban (ELIQUIS) 5 MG tablet tablet Take 1 tablet by mouth 2 (Two) Times a Day. 60 tablet 5   • azelastine (ASTELIN) 0.1 % nasal spray 2 sprays into the nostril(s) as directed by provider 2 (Two) Times a Day. Use in each nostril as directed     • celecoxib (CeleBREX) 200 MG capsule Take 200 mg by mouth Daily.     • metoprolol " "succinate XL (TOPROL-XL) 25 MG 24 hr tablet Take 1 tablet by mouth 2 (Two) Times a Day. 60 tablet 11   • metroNIDAZOLE (METROCREAM) 0.75 % cream      • Probiotic Product (PROBIOTIC PO) Take 1 tablet by mouth Daily.     • timolol (TIMOPTIC) 0.5 % ophthalmic solution      • Misc Natural Products (Glucosamine Chond Cmp Advanced) tablet Take 1 tablet by mouth Daily.       No facility-administered medications prior to visit.       No opioid medication identified on active medication list. I have reviewed chart for other potential  high risk medication/s and harmful drug interactions in the elderly.          Aspirin is not on active medication list.  Aspirin use is not indicated based on review of current medical condition/s. Risk of harm outweighs potential benefits.  .    Patient Active Problem List   Diagnosis   • Arthritis   • Abnormal prostate specific antigen   • Borderline diabetes   • Chronic anterior uveitis   • Change in bowel habits   • Functional diarrhea   • Adenomatous polyp of ascending colon   • Abdominal bloating   • Celiac disease   • Malignant neoplasm of prostate (HCC)   • Primary open angle glaucoma (POAG)   • Squamous cell carcinoma of skin     Advance Care Planning  Advance Directive is on file.  ACP discussion was held with the patient during this visit. Patient has an advance directive in EMR which is still valid.           Objective    Vitals:    07/29/22 1306   BP: 132/66   Pulse: 54   SpO2: 97%   Weight: 67.1 kg (148 lb)   Height: 175.3 cm (69\")     Estimated body mass index is 21.86 kg/m² as calculated from the following:    Height as of this encounter: 175.3 cm (69\").    Weight as of this encounter: 67.1 kg (148 lb).    BMI is within normal parameters. No other follow-up for BMI required.      Does the patient have evidence of cognitive impairment? No    Physical Exam  Vitals reviewed.   Constitutional:       General: He is not in acute distress.     Appearance: Normal appearance. He is " normal weight. He is not ill-appearing.   HENT:      Head: Atraumatic.      Right Ear: Tympanic membrane, ear canal and external ear normal. There is no impacted cerumen.      Left Ear: External ear normal. There is impacted cerumen.   Eyes:      General: No scleral icterus.  Cardiovascular:      Rate and Rhythm: Regular rhythm. Bradycardia present.      Heart sounds: Normal heart sounds. No murmur heard.  Pulmonary:      Effort: Pulmonary effort is normal. No respiratory distress.      Breath sounds: Normal breath sounds. No wheezing.   Abdominal:      General: Bowel sounds are normal. There is no distension.      Palpations: Abdomen is soft.      Tenderness: There is no abdominal tenderness. There is no guarding.   Musculoskeletal:      Right lower leg: No edema.      Left lower leg: No edema.   Skin:     General: Skin is warm.      Coloration: Skin is not jaundiced.   Neurological:      Mental Status: He is alert.   Psychiatric:         Mood and Affect: Mood normal.         Behavior: Behavior normal.         Thought Content: Thought content normal.                 HEALTH RISK ASSESSMENT    Smoking Status:  Social History     Tobacco Use   Smoking Status Former Smoker   • Packs/day: 1.00   • Years: 29.00   • Pack years: 29.00   • Types: Cigarettes   • Quit date: 7/10/1985   • Years since quittin.0   Smokeless Tobacco Never Used     Alcohol Consumption:  Social History     Substance and Sexual Activity   Alcohol Use Not Currently   • Alcohol/week: 0.0 - 1.0 standard drinks    Comment: previously drank at least 1 drink daily     Fall Risk Screen:    [facility] Fall Risk Assessment was completed, and patient is at LOW risk for falls.Assessment completed on:2022    Depression Screening:  PHQ-2/PHQ-9 Depression Screening 2022   Little Interest or Pleasure in Doing Things 0-->not at all   Feeling Down, Depressed or Hopeless 0-->not at all   PHQ-9: Brief Depression Severity Measure Score 0       Health Habits  and Functional and Cognitive Screening:  Functional & Cognitive Status 7/29/2022   Do you have difficulty preparing food and eating? No   Do you have difficulty bathing yourself, getting dressed or grooming yourself? No   Do you have difficulty using the toilet? No   Do you have difficulty moving around from place to place? No   Do you have trouble with steps or getting out of a bed or a chair? (No Data)   Current Diet (No Data)        Current Diet Comment Balance   Dental Exam Up to date   Eye Exam Up to date   Exercise (times per week) 4 times per week   Current Exercises Include Weightlifting        Exercise Comment situp with eight    Do you need help using the phone?  No   Are you deaf or do you have serious difficulty hearing?  (No Data)   Do you need help with transportation? No   Do you need help shopping? No   Do you need help preparing meals?  No   Do you need help with housework?  No   Do you need help with laundry? No   Do you need help taking your medications? No   Do you need help managing money? No   Do you ever drive or ride in a car without wearing a seat belt? No   Have you felt unusual stress, anger or loneliness in the last month? No   Who do you live with? Spouse   If you need help, do you have trouble finding someone available to you? No   Do you have difficulty concentrating, remembering or making decisions? (No Data)       Age-appropriate Screening Schedule:  Refer to the list below for future screening recommendations based on patient's age, sex and/or medical conditions. Orders for these recommended tests are listed in the plan section. The patient has been provided with a written plan.    Health Maintenance   Topic Date Due   • TDAP/TD VACCINES (1 - Tdap) Never done   • INFLUENZA VACCINE  10/01/2022   • ZOSTER VACCINE  Completed              Assessment & Plan   CMS Preventative Services Quick Reference  Risk Factors Identified During Encounter  Cardiovascular Disease: pt with new diagnosis  of atrial fibrillation, now anticoagulated with apixaban and rate controlled; explained rate vs rhythm controlled strategies with the patient as he was concerned that he still has atrial fibrillation at times on his home BP cuff  Hearing Problem: he wears hearing aids  Immunizations Discussed/Encouraged (specific Immunizations; Prevnar 20 (Pneumococcal 20-valent conjugate) accepted today. Pt states he is up to date on COVID 19 and two boosters.  The above risks/problems have been discussed with the patient.  Follow up actions/plans if indicated are seen below in the Assessment/Plan Section.  Pertinent information has been shared with the patient in the After Visit Summary.    Diagnoses and all orders for this visit:    1. Fatigue, unspecified type (Primary)  -6 month duration, does not wake up feeling well rested; also reports that he has been told he snores  -will obtain CMP, CBC, TSH today  -given snoring + recent diagnosis of atrial fibrillation I would like for him to see sleep medicine as well for consideration of sleep study  -     CBC w AUTO Differential  -     Comprehensive metabolic panel  -     TSH Rfx On Abnormal To Free T4  -     Ambulatory Referral to Sleep Medicine    2. Impacted cerumen of left ear       -begin OTC ear wax softening drops     3. Hemorrhoids, unspecified hemorrhoid type       -pt reports issues with hemorrhoids in the past, recommended he begin OTC fiber supplement as he deals with a lot of gas as well and feels like he has to rub on his stomach to keep things moving.       Follow Up:   No follow-ups on file.     An After Visit Summary and PPPS were made available to the patient.

## 2022-07-30 LAB
ALBUMIN SERPL-MCNC: 4.4 G/DL (ref 3.6–4.6)
ALBUMIN/GLOB SERPL: 1.8 {RATIO} (ref 1.2–2.2)
ALP SERPL-CCNC: 79 IU/L (ref 44–121)
ALT SERPL-CCNC: 10 IU/L (ref 0–44)
AST SERPL-CCNC: 16 IU/L (ref 0–40)
BASOPHILS # BLD AUTO: 0.1 X10E3/UL (ref 0–0.2)
BASOPHILS NFR BLD AUTO: 1 %
BILIRUB SERPL-MCNC: 0.5 MG/DL (ref 0–1.2)
BUN SERPL-MCNC: 16 MG/DL (ref 8–27)
BUN/CREAT SERPL: 17 (ref 10–24)
CALCIUM SERPL-MCNC: 9.3 MG/DL (ref 8.6–10.2)
CHLORIDE SERPL-SCNC: 98 MMOL/L (ref 96–106)
CO2 SERPL-SCNC: 21 MMOL/L (ref 20–29)
CREAT SERPL-MCNC: 0.94 MG/DL (ref 0.76–1.27)
EGFRCR SERPLBLD CKD-EPI 2021: 78 ML/MIN/1.73
EOSINOPHIL # BLD AUTO: 0.3 X10E3/UL (ref 0–0.4)
EOSINOPHIL NFR BLD AUTO: 6 %
ERYTHROCYTE [DISTWIDTH] IN BLOOD BY AUTOMATED COUNT: 12.2 % (ref 11.6–15.4)
GLOBULIN SER CALC-MCNC: 2.4 G/DL (ref 1.5–4.5)
GLUCOSE SERPL-MCNC: 89 MG/DL (ref 65–99)
HCT VFR BLD AUTO: 42.4 % (ref 37.5–51)
HGB BLD-MCNC: 14.5 G/DL (ref 13–17.7)
IMM GRANULOCYTES # BLD AUTO: 0 X10E3/UL (ref 0–0.1)
IMM GRANULOCYTES NFR BLD AUTO: 0 %
LYMPHOCYTES # BLD AUTO: 1.7 X10E3/UL (ref 0.7–3.1)
LYMPHOCYTES NFR BLD AUTO: 32 %
MCH RBC QN AUTO: 31.5 PG (ref 26.6–33)
MCHC RBC AUTO-ENTMCNC: 34.2 G/DL (ref 31.5–35.7)
MCV RBC AUTO: 92 FL (ref 79–97)
MONOCYTES # BLD AUTO: 0.8 X10E3/UL (ref 0.1–0.9)
MONOCYTES NFR BLD AUTO: 14 %
NEUTROPHILS # BLD AUTO: 2.5 X10E3/UL (ref 1.4–7)
NEUTROPHILS NFR BLD AUTO: 47 %
PLATELET # BLD AUTO: 296 X10E3/UL (ref 150–450)
POTASSIUM SERPL-SCNC: 5 MMOL/L (ref 3.5–5.2)
PROT SERPL-MCNC: 6.8 G/DL (ref 6–8.5)
RBC # BLD AUTO: 4.61 X10E6/UL (ref 4.14–5.8)
SODIUM SERPL-SCNC: 132 MMOL/L (ref 134–144)
TSH SERPL DL<=0.005 MIU/L-ACNC: 2.23 UIU/ML (ref 0.45–4.5)
WBC # BLD AUTO: 5.4 X10E3/UL (ref 3.4–10.8)

## 2022-08-05 ENCOUNTER — OFFICE VISIT (OUTPATIENT)
Dept: CARDIOLOGY | Facility: CLINIC | Age: 87
End: 2022-08-05

## 2022-08-05 VITALS
DIASTOLIC BLOOD PRESSURE: 80 MMHG | HEIGHT: 69 IN | BODY MASS INDEX: 21.92 KG/M2 | RESPIRATION RATE: 16 BRPM | WEIGHT: 148 LBS | HEART RATE: 51 BPM | SYSTOLIC BLOOD PRESSURE: 140 MMHG | OXYGEN SATURATION: 98 %

## 2022-08-05 DIAGNOSIS — I48.0 PAROXYSMAL ATRIAL FIBRILLATION: Primary | ICD-10-CM

## 2022-08-05 PROCEDURE — 99214 OFFICE O/P EST MOD 30 MIN: CPT | Performed by: NURSE PRACTITIONER

## 2022-08-05 PROCEDURE — 93000 ELECTROCARDIOGRAM COMPLETE: CPT | Performed by: NURSE PRACTITIONER

## 2022-08-05 NOTE — PROGRESS NOTES
Date of Office Visit: 2022  Encounter Provider: CHAN Mancini  Place of Service: [facility] C[facility]  Patient Name: Steffen Leyva  :1935  Primary Cardiologist: Dr. Carey    CC:  5 week follow up    Dear Dr. Zhang    HPI: Steffen Leyva is a pleasant 87 y.o. male who presents 2022 for cardiac follow up. He is a new patient to me and I have reviewed his past medical records.  He is a patient of Dr. Carey.    When seen by Dr. Carey in 2016,  there was a questionable remote history of atrial fibrillation, although we were never able to find any documentation of that.  At the time, he stated he thought it was around  when it occurred and it had been related to excess caffeine intake.  We did have him wear a Holter monitor that showed no significant arrhythmia.     He saw Dr. Carey on 2022 and stated he had been having recurrent episodes of this rapid irregular heart rate.  He believed it was atrial fibrillation.  He was seen in the urgent care center in [place] (he spends about 6 months a year in [place]) but they were not able to document any atrial fibrillation.  Blood pressure was elevated so they started him on amlodipine. A Monitor was ordered:    2022 - Interpretation Summary  · An abnormal monitor study.  · Paroxysmal atrial fibrillation with RVR as noted. 4% of the recorded beats represent atrial fibrillation.    Based on the results, he was started on metoprolol 25 mg and Eliquis 5 mg.    He returns today in follow up.  He is tolerating the metoprolol 25 mg BID and has not had any issues with unexplained bleeding with the Eliquis.  Long discussion about atrial fibrillation and the need to treat.  He voiced understanding.  He is occasionally can feel some palpitations.  He denies any shortness of breath, lower extremity edema, dizziness or lightheadedness.  He has not had any chest pain or chest pressure.  He states he does have level of fatigue  but it is unchanged.  He did have labs 7/29/2022 that showed a normal CBC.  CBC was normal except for his sodium was a bit low at 132.  TSH normal at 2.230.    Past Medical History:   Diagnosis Date   • Arthritis    • Atrial fibrillation (HCC)    • Cataract    • Corneal abrasion, left 07/23/2015    SEEN AT [facility]   • Elevated PSA 07/2015   • Glaucoma    • Hemorrhoids    • Hypertension    • IGT (impaired glucose tolerance) 07/2015   • Prostate cancer (HCC) 07/2019       Past Surgical History:   Procedure Laterality Date   • COLONOSCOPY N/A 1994    NO RECORDS, OLD CHART NOT AVAILABLE, DR. DAHIANA CURTIS   • COLONOSCOPY N/A 12/07/2005    RIGHT COLON LESION, PATH: TUBULAR ADENOMA, SIGMOID DIVERTICULOSIS, DR. JENNY XIE AT [facility]   • COLONOSCOPY N/A 7/5/2018    Procedure: COLONOSCOPY to cecum with hot snare polypectomy with resolution clip x 1, and spot tattoo marker;  Surgeon: Cathi Coto MD;  Location: [facility] ENDOSCOPY;  Service: Gastroenterology   • COLONOSCOPY N/A 7/10/2020    Procedure: COLONOSCOPY INTO CECUM AND NORMAL TI WITH BX;  Surgeon: Jose Francisco Rodriguez MD;  Location: [facility] ENDOSCOPY;  Service: Gastroenterology;  Laterality: N/A;  PRE: DIARRHEA   POST: DIVERTICULOSIS, INK AND SCAR-POST POLYPECTOMY SITE, HEMORRHOIDS    • ENDOSCOPY N/A 7/10/2020    Procedure: ESOPHAGOGASTRODUODENOSCOPY WITH BX;  Surgeon: Jose Francisco Rodriguez MD;  Location: [facility] ENDOSCOPY;  Service: Gastroenterology;  Laterality: N/A;  PRE: POSITIVE CELIAC ANTIBODIES   POST: NORMAL   • EYE SURGERY Bilateral 2012    RELIEF OF ELEVATED INTRAOCULAR PRESSURE, PERFORMED IN [facility]   • HEMORRHOID BANDING N/A 01/19/2006    DR. DAHIANA CURTIS   • HEMORRHOID BANDING N/A 1980    NO RECORD AVAILABLE   • KNEE ARTHROPLASTY Right 2013    D/T MENISCUS TEAR, DR. SANTACRUZ [place], [place]   • KNEE ARTHROSCOPY Left 1980       Social History     Socioeconomic History   • Marital status:    • Number of children: 5   Tobacco Use   •  Smoking status: Former Smoker     Packs/day: 1.00     Years: 29.00     Pack years: 29.00     Types: Cigarettes     Quit date: 7/10/1985     Years since quittin.0   • Smokeless tobacco: Never Used   • Tobacco comment: no caffiene   Vaping Use   • Vaping Use: Never used   Substance and Sexual Activity   • Alcohol use: Not Currently     Alcohol/week: 0.0 - 1.0 standard drinks     Comment: previously drank at least 1 drink daily   • Drug use: Never       Family History   Problem Relation Age of Onset   • Aortic aneurysm Mother    • Glaucoma Mother    • Aneurysm Mother    • Heart disease Father 80   • Heart disease Brother 60   • Alcohol abuse Brother    • No Known Problems Brother        The following portion of the patient's history were reviewed and updated as appropriate: past medical history, past surgical history, past social history, past family history, allergies, current medications, and problem list.    Review of Systems   Constitutional: Positive for malaise/fatigue. Negative for diaphoresis and fever.   HENT: Negative for congestion, hearing loss, hoarse voice, nosebleeds and sore throat.    Eyes: Negative for photophobia, vision loss in left eye, vision loss in right eye and visual disturbance.   Cardiovascular: Positive for palpitations. Negative for chest pain, dyspnea on exertion, irregular heartbeat, leg swelling, near-syncope, orthopnea, paroxysmal nocturnal dyspnea and syncope.   Respiratory: Negative for cough, hemoptysis, shortness of breath, sleep disturbances due to breathing, snoring, sputum production and wheezing.    Endocrine: Negative for cold intolerance, heat intolerance, polydipsia, polyphagia and polyuria.   Hematologic/Lymphatic: Negative for bleeding problem. Does not bruise/bleed easily.   Skin: Negative for color change, dry skin, poor wound healing, rash and suspicious lesions.   Musculoskeletal: Negative for arthritis, back pain, falls, gout, joint pain, joint swelling, muscle  "cramps, muscle weakness and myalgias.   Gastrointestinal: Negative for bloating, abdominal pain, constipation, diarrhea, dysphagia, melena, nausea and vomiting.   Neurological: Negative for excessive daytime sleepiness, dizziness, headaches, light-headedness, loss of balance, numbness, paresthesias, seizures, vertigo and weakness.   Psychiatric/Behavioral: Negative for depression, memory loss and substance abuse. The patient is not nervous/anxious.        Allergies   Allergen Reactions   • Prednisolone Unknown - High Severity     High Eye pressure         Current Outpatient Medications:   •  apixaban (ELIQUIS) 5 MG tablet tablet, Take 1 tablet by mouth 2 (Two) Times a Day., Disp: 60 tablet, Rfl: 5  •  azelastine (ASTELIN) 0.1 % nasal spray, 2 sprays into the nostril(s) as directed by provider 2 (Two) Times a Day. Use in each nostril as directed, Disp: , Rfl:   •  celecoxib (CeleBREX) 200 MG capsule, Take 200 mg by mouth Daily., Disp: , Rfl:   •  metoprolol succinate XL (TOPROL-XL) 25 MG 24 hr tablet, Take 1 tablet by mouth 2 (Two) Times a Day., Disp: 60 tablet, Rfl: 11  •  metroNIDAZOLE (METROCREAM) 0.75 % cream, , Disp: , Rfl:   •  Misc Natural Products (Glucosamine Chond Cmp Advanced) tablet, Take 1 tablet by mouth Daily., Disp: , Rfl:   •  Probiotic Product (PROBIOTIC PO), Take 1 tablet by mouth Daily., Disp: , Rfl:   •  timolol (TIMOPTIC) 0.5 % ophthalmic solution, , Disp: , Rfl:         Objective:     Vitals:    08/05/22 0936   BP: 140/80   Pulse: 51   Resp: 16   SpO2: 98%   Weight: 67.1 kg (148 lb)   Height: 175.3 cm (69\")     Body mass index is 21.86 kg/m².      Vitals reviewed.   Constitutional:       General: Not in acute distress.     Appearance: Healthy appearance. Well-developed.   Eyes:      General:         Right eye: No discharge.         Left eye: No discharge.      Conjunctiva/sclera: Conjunctivae normal.   HENT:      Head: Normocephalic and atraumatic.      Right Ear: External ear normal.      Left " Ear: External ear normal.      Nose: Nose normal.   Neck:      Thyroid: No thyromegaly.      Vascular: No JVD.      Trachea: No tracheal deviation.      Lymphadenopathy: No cervical adenopathy.   Pulmonary:      Effort: Pulmonary effort is normal. No respiratory distress.      Breath sounds: Normal breath sounds. No wheezing. No rales.   Chest:      Chest wall: Not tender to palpatation.   Cardiovascular:      Normal rate. Regular rhythm.      No gallop.   Pulses:     Intact distal pulses.   Edema:     Peripheral edema absent.   Abdominal:      General: There is no distension.      Palpations: Abdomen is soft.      Tenderness: There is no abdominal tenderness.   Musculoskeletal: Normal range of motion.         General: No tenderness or deformity.      Cervical back: Normal range of motion and neck supple. Skin:     General: Skin is warm and dry.      Findings: No erythema or rash.   Neurological:      Mental Status: Alert and oriented to person, place, and time.      Coordination: Coordination normal.   Psychiatric:         Behavior: Behavior normal. Behavior is cooperative.         Thought Content: Thought content normal.         Cognition and Memory: Cognition normal.         Judgment: Judgment normal.               ECG 12 Lead    Date/Time: 8/5/2022 12:36 PM  Performed by: Rosemarie Acosta APRN  Authorized by: Rosemarie Acosta APRN   Comparison: compared with previous ECG from 6/30/2022  Similar to previous ECG  Rhythm: sinus rhythm  Rate: normal  Conduction: conduction normal  ST Segments: ST segments normal  T Waves: T waves normal  QRS axis: left    Clinical impression: non-specific ECG              Assessment:       Diagnosis Plan   1. Paroxysmal atrial fibrillation (HCC)            Plan:          1.   PAF - Monitor did show he is pating PAF 4% of the time.  Will remain on BB and Eliquis.    2.  Hypertension-controlled    RTO in 3 months prior to going to [place] for the winter.    As always, it has been a  pleasure to participate in your patient's care. Thank you.       Sincerely,       CHAN Mancini      Current Outpatient Medications:   •  apixaban (ELIQUIS) 5 MG tablet tablet, Take 1 tablet by mouth 2 (Two) Times a Day., Disp: 60 tablet, Rfl: 5  •  azelastine (ASTELIN) 0.1 % nasal spray, 2 sprays into the nostril(s) as directed by provider 2 (Two) Times a Day. Use in each nostril as directed, Disp: , Rfl:   •  celecoxib (CeleBREX) 200 MG capsule, Take 200 mg by mouth Daily., Disp: , Rfl:   •  metoprolol succinate XL (TOPROL-XL) 25 MG 24 hr tablet, Take 1 tablet by mouth 2 (Two) Times a Day., Disp: 60 tablet, Rfl: 11  •  metroNIDAZOLE (METROCREAM) 0.75 % cream, , Disp: , Rfl:   •  Misc Natural Products (Glucosamine Chond Cmp Advanced) tablet, Take 1 tablet by mouth Daily., Disp: , Rfl:   •  Probiotic Product (PROBIOTIC PO), Take 1 tablet by mouth Daily., Disp: , Rfl:   •  timolol (TIMOPTIC) 0.5 % ophthalmic solution, , Disp: , Rfl:       Dictated utilizing Dragon dictation

## 2022-08-24 ENCOUNTER — OFFICE VISIT (OUTPATIENT)
Dept: SLEEP MEDICINE | Facility: HOSPITAL | Age: 87
End: 2022-08-24

## 2022-08-24 VITALS
HEART RATE: 50 BPM | OXYGEN SATURATION: 97 % | SYSTOLIC BLOOD PRESSURE: 127 MMHG | BODY MASS INDEX: 21.62 KG/M2 | HEIGHT: 69 IN | WEIGHT: 146 LBS | DIASTOLIC BLOOD PRESSURE: 70 MMHG

## 2022-08-24 DIAGNOSIS — R06.81 WITNESSED EPISODE OF APNEA: ICD-10-CM

## 2022-08-24 DIAGNOSIS — R06.83 SNORING: ICD-10-CM

## 2022-08-24 DIAGNOSIS — G47.10 HYPERSOMNIA: Primary | ICD-10-CM

## 2022-08-24 DIAGNOSIS — I48.0 PAROXYSMAL ATRIAL FIBRILLATION: ICD-10-CM

## 2022-08-24 PROCEDURE — G0463 HOSPITAL OUTPT CLINIC VISIT: HCPCS

## 2022-08-24 RX ORDER — ZOLPIDEM TARTRATE 5 MG/1
5 TABLET ORAL NIGHTLY PRN
Qty: 1 TABLET | Refills: 0 | Status: SHIPPED | OUTPATIENT
Start: 2022-08-24 | End: 2022-10-26 | Stop reason: ALTCHOICE

## 2022-08-24 NOTE — PROGRESS NOTES
[facility]  Telephone: [phone] / Fax: [phone] [place]  Telephone: [phone] / Fax: [phone] Vanessa Chong    Referring Physician: Bony Zhang DO   PCP: Bony Zhang DO    Reason for consult:  sleep apnea    Steffen Leyva is a 87 y.o.male  was seen in the Sleep Disorders Center today for evaluation of sleep apnea.  He reports EDS ongoing for several months. Wife reports loud snoring with associated apneas. He goes to bed at 10:30pm-8am. He wakes up between 1-4 times per night to use the restroom. He has history of atrial fibrillation, HTN, and GERD.    SH- retired , former heavy smoker age [age], 1ppd, no drugs.    ROS-+frequent urination, +post nasal drip, +fatigue, +irregular HR, rest is negative.      Steffen Leyva  has a past medical history of Arthritis, Atrial fibrillation (HCC), Cataract, Corneal abrasion, left (07/23/2015), Elevated PSA (07/2015), Glaucoma, Hemorrhoids, Hypertension, IGT (impaired glucose tolerance) (07/2015), and Prostate cancer (HCC) (07/2019).    Current Medications:    Current Outpatient Medications:   •  apixaban (ELIQUIS) 5 MG tablet tablet, Take 1 tablet by mouth 2 (Two) Times a Day., Disp: 60 tablet, Rfl: 5  •  azelastine (ASTELIN) 0.1 % nasal spray, 2 sprays into the nostril(s) as directed by provider 2 (Two) Times a Day. Use in each nostril as directed, Disp: , Rfl:   •  celecoxib (CeleBREX) 200 MG capsule, Take 200 mg by mouth Daily., Disp: , Rfl:   •  metoprolol succinate XL (TOPROL-XL) 25 MG 24 hr tablet, Take 1 tablet by mouth 2 (Two) Times a Day., Disp: 60 tablet, Rfl: 11  •  metroNIDAZOLE (METROCREAM) 0.75 % cream, , Disp: , Rfl:   •  Misc Natural Products (Glucosamine Chond Cmp Advanced) tablet, Take 1 tablet by mouth Daily., Disp: , Rfl:   •  Probiotic Product (PROBIOTIC PO), Take 1 tablet by mouth Daily., Disp: , Rfl:   •  timolol (TIMOPTIC) 0.5 % ophthalmic solution, , Disp: , Rfl:     I have reviewed Past Medical  "History, Past Surgical History, Medication List, Social History and Family History as entered in Sleep Questionnaire and EPIC.    ESS  7   Vital Signs /70   Pulse 50   Ht 175.3 cm (69\")   Wt 66.2 kg (146 lb)   SpO2 97%   BMI 21.56 kg/m²  Body mass index is 21.56 kg/m².    General Alert and oriented. No acute distress noted   Pharynx/Throat Class II  Mallampati airway, large tongue, no evidence of redundant lateral pharyngeal tissue. No oral lesions. No thrush. Moist mucous membranes.   Head Normocephalic. Symmetrical. Atraumatic.    Nose No septal deviation. No drainage   Chest Wall Normal shape. Symmetric expansion with respiration. No tenderness.   Neck Trachea midline, no thyromegaly or adenopathy    Lungs Clear to auscultation bilaterally. No wheezes. No rhonchi. No rales. Respirations regular, even and unlabored.   Heart Regular rhythm and normal rate. Normal S1 and S2. No murmur   Abdomen Soft, non-tender and non-distended. Normal bowel sounds. No masses.   Extremities Moves all extremities well. No edema   Psychiatric Normal mood and affect.        Impression:  1. Hypersomnia    2. Snoring    3. Witnessed episode of apnea    4. Paroxysmal atrial fibrillation (HCC)          Plan:  I discussed the pathophysiology of obstructive sleep apnea with the patient.  We discussed the adverse outcomes associated with untreated sleep-disordered breathing.  We discussed treatment modalities of obstructive sleep apnea including CPAP device. Sleep study will be scheduled to establish a definitive diagnosis of sleep disorder breathing.  Weight loss will be strongly beneficial in order to reduce the severity of sleep-disordered breathing.  Patient has narrow oropharyngeal structure.  Caution during activities that require prolonged concentration is strongly advised.  After sleep study results are available, patient will be notified, and appointment will be scheduled to discuss sleep study results and treatment " recommendations.    Rx for Ambien 5mg x 1 was provided for in lab polysomnogram. Patient was instructed to bring the Ambien tablet to the sleep lab and take at lights out . Strict instructions were given to NOT take the Ambien at home.    Instructions for the night sleep tech  It is permitted to use zolpidem 5 mg prior to 1 AM.  If patient has Obstructive Sleep Apnea on diagnostic portion with AHI > 5 please do titration with CPAP and/or BIPAP per sleep disorder center policy.      If in lab PSG gets denied, we will switch it over to the home sleep study.      I appreciate the opportunity to participate in this patient's care.      CHAN Carrizales  [facility]  Phone: [phone]      Part of this note may be an electronic transcription/translation of spoken language to printed text using the Dragon Dictation System. Some errors may exist even though the document was edited.

## 2022-09-23 DIAGNOSIS — G47.33 OBSTRUCTIVE SLEEP APNEA: Primary | ICD-10-CM

## 2022-09-26 ENCOUNTER — TELEPHONE (OUTPATIENT)
Dept: SLEEP MEDICINE | Facility: HOSPITAL | Age: 87
End: 2022-09-26

## 2022-09-26 NOTE — TELEPHONE ENCOUNTER
----- Message from Darren Irby MD sent at 9/23/2022  6:28 AM EDT -----  Regarding: RE: order  Done    ----- Message -----  From: Kim Izaguirre  Sent: 9/22/2022   3:33 PM EDT  To: Darren Irby MD, Jie Alamo, APRN  Subject: order                                            I am needing an order for HST.  Anabel denied inlab sleep study

## 2022-09-27 ENCOUNTER — APPOINTMENT (OUTPATIENT)
Dept: SLEEP MEDICINE | Facility: HOSPITAL | Age: 87
End: 2022-09-27

## 2022-10-04 ENCOUNTER — HOSPITAL ENCOUNTER (OUTPATIENT)
Dept: SLEEP MEDICINE | Facility: HOSPITAL | Age: 87
Discharge: HOME | End: 2022-10-04
Admitting: INTERNAL MEDICINE

## 2022-10-04 DIAGNOSIS — G47.33 OBSTRUCTIVE SLEEP APNEA: ICD-10-CM

## 2022-10-04 PROCEDURE — 95806 SLEEP STUDY UNATT&RESP EFFT: CPT

## 2022-10-10 ENCOUNTER — TELEPHONE (OUTPATIENT)
Dept: SLEEP MEDICINE | Facility: HOSPITAL | Age: 87
End: 2022-10-10

## 2022-10-20 ENCOUNTER — TELEPHONE (OUTPATIENT)
Dept: SLEEP MEDICINE | Facility: HOSPITAL | Age: 87
End: 2022-10-20

## 2022-10-26 ENCOUNTER — OFFICE VISIT (OUTPATIENT)
Dept: CARDIOLOGY | Facility: CLINIC | Age: 87
End: 2022-10-26

## 2022-10-26 VITALS
HEART RATE: 57 BPM | SYSTOLIC BLOOD PRESSURE: 122 MMHG | DIASTOLIC BLOOD PRESSURE: 82 MMHG | BODY MASS INDEX: 22.66 KG/M2 | WEIGHT: 153 LBS | HEIGHT: 69 IN

## 2022-10-26 DIAGNOSIS — I48.0 PAROXYSMAL ATRIAL FIBRILLATION: Primary | ICD-10-CM

## 2022-10-26 PROBLEM — G47.33 OSA (OBSTRUCTIVE SLEEP APNEA): Status: ACTIVE | Noted: 2022-10-26

## 2022-10-26 PROCEDURE — 99214 OFFICE O/P EST MOD 30 MIN: CPT | Performed by: INTERNAL MEDICINE

## 2022-10-26 RX ORDER — METOPROLOL SUCCINATE 25 MG/1
25 TABLET, EXTENDED RELEASE ORAL 2 TIMES DAILY
Qty: 180 TABLET | Refills: 3 | Status: SHIPPED | OUTPATIENT
Start: 2022-10-26 | End: 2022-10-28 | Stop reason: SDUPTHER

## 2022-10-26 NOTE — PROGRESS NOTES
Subjective:     Encounter Date:06/30/2022      Patient ID: Steffen Leyva is a 87 y.o. male.    Chief Complaint:  History of Present Illness      Dear Dr. Welch,    I had the pleasure of seeing this patient in the office today for follow-up of his cardiac status.    He came to see us with complaint of palpitations and Holter monitor showed paroxysmal atrial fibrillation with a 4% A. fib burden.  He was started on metoprolol daily, still had some palpitations that were breaking through so he went to twice a day and this completely resolved his sensation of palpitations.  He was also started on Eliquis because of an elevated [id number] score as outlined below.    Patient then underwent sleep study which showed severe sleep apnea he is scheduled to get CPAP tomorrow.  He is can be leaving soon for [place] and will be there for 6 months.    Currently has no cardiac complaints.  He denies any chest pain, pressure, tightness, squeezing, or heartburn.  He has not experienced any feeling of palpitations, tachycardia or heart racing and no presyncope or syncope.  There has not been any problems with dizziness or lightheadedness.  There has not been any orthopnea or PND, and no problems with lower extremity edema.  He denies any shortness of breath at rest or with activity and has not had any wheezing.  He has not had any problems with unexplained nausea or vomiting. He has continued to perform daily activities of living without any specific problem or change in the level of activity.  He has not been recently hospitalized for any reason.    Patient has no known history of coronary disease, congestive heart failure, rheumatic fever, rheumatic heart disease, or congenital heart disease.    The following portions of the patient's history were reviewed and updated as appropriate: allergies, current medications, past family history, past medical history, past social history, past surgical history and problem list.    Past  "Medical History:   Diagnosis Date   • Arthritis    • Atrial fibrillation (HCC)    • Cataract    • Corneal abrasion, left 07/23/2015    SEEN AT [facility] ER   • Elevated PSA 07/2015   • Glaucoma    • Hemorrhoids    • Hypertension    • IGT (impaired glucose tolerance) 07/2015   • Prostate cancer (HCC) 07/2019       Past Surgical History:   Procedure Laterality Date   • COLONOSCOPY N/A 1994    NO RECORDS, OLD CHART NOT AVAILABLE, DR. DAHIANA CURTIS   • COLONOSCOPY N/A 12/07/2005    RIGHT COLON LESION, PATH: TUBULAR ADENOMA, SIGMOID DIVERTICULOSIS, DR. JENNY XIE AT [facility]   • COLONOSCOPY N/A 7/5/2018    Procedure: COLONOSCOPY to cecum with hot snare polypectomy with resolution clip x 1, and spot tattoo marker;  Surgeon: Cathi Coto MD;  Location[facility];  Service: Gastroenterology   • COLONOSCOPY N/A 7/10/2020    Procedure: COLONOSCOPY INTO CECUM AND NORMAL TI WITH BX;  Surgeon: Jose Francisco Rodriguez MD;  Location[facility];  Service: Gastroenterology;  Laterality: N/A;  PRE: DIARRHEA   POST: DIVERTICULOSIS, INK AND SCAR-POST POLYPECTOMY SITE, HEMORRHOIDS    • ENDOSCOPY N/A 7/10/2020    Procedure: ESOPHAGOGASTRODUODENOSCOPY WITH BX;  Surgeon: Jose Francisco Rodriguez MD;  Location: [facility] ENDOSCOPY;  Service: Gastroenterology;  Laterality: N/A;  PRE: POSITIVE CELIAC ANTIBODIES   POST: NORMAL   • EYE SURGERY Bilateral 2012    RELIEF OF ELEVATED INTRAOCULAR PRESSURE, PERFORMED IN [facility]   • HEMORRHOID BANDING N/A 01/19/2006    DR. DAHIANA CURTIS   • HEMORRHOID BANDING N/A 1980    NO RECORD AVAILABLE   • KNEE ARTHROPLASTY Right 2013    D/T MENISCUS TEAR, DR. SANTACRUZ IN [place], [place]   • KNEE ARTHROSCOPY Left 1980         Procedures       Objective:     Vitals:    10/26/22 0946   BP: 122/82   Pulse: 57   Weight: 69.4 kg (153 lb)   Height: 175.3 cm (69\")         Physical Exam  Constitutional:       General: He is not in acute distress.     Appearance: He is well-developed. He is not diaphoretic. "   HENT:      Head: Normocephalic and atraumatic.      Nose: Nose normal.   Eyes:      General:         Right eye: No discharge.         Left eye: No discharge.      Conjunctiva/sclera: Conjunctivae normal.      Pupils: Pupils are equal, round, and reactive to light.   Neck:      Thyroid: No thyromegaly.      Trachea: No tracheal deviation.   Cardiovascular:      Rate and Rhythm: Normal rate and regular rhythm.      Pulses: Normal pulses.      Heart sounds: Normal heart sounds, S1 normal and S2 normal.     No S3 sounds.   Pulmonary:      Effort: Pulmonary effort is normal. No respiratory distress.      Breath sounds: Normal breath sounds. No stridor.   Chest:      Chest wall: No tenderness.   Abdominal:      General: Bowel sounds are normal. There is no distension.      Palpations: Abdomen is soft. There is no mass.      Tenderness: There is no abdominal tenderness. There is no guarding or rebound.   Musculoskeletal:         General: No tenderness or deformity. Normal range of motion.      Cervical back: Normal range of motion and neck supple.   Lymphadenopathy:      Cervical: No cervical adenopathy.   Skin:     General: Skin is warm and dry.      Findings: No erythema or rash.   Neurological:      Mental Status: He is alert and oriented to person, place, and time.      Deep Tendon Reflexes: Reflexes are normal and symmetric.   Psychiatric:         Thought Content: Thought content normal.       CHADS-VASc Risk Assessment            3 Total Score    1 Hypertension    2 Age >/= 75        Criteria that do not apply:    CHF    DM    PRIOR STROKE/TIA/THROMBO    Vascular Disease    Age 65-74    Sex: Female          Lab Review:               Results for orders placed during the hospital encounter of 07/20/16    Adult transthoracic echo complete    Interpretation Summary  · All left ventricular wall segments contract normally.  · Left ventricular function is normal. Estimated EF = 61%.  · Mild tricuspid valve regurgitation  is present.    Lab Results   Component Value Date    GLUCOSE 89 07/29/2022    BUN 16 07/29/2022    CREATININE 0.94 07/29/2022    EGFRIFNONA 69 06/15/2020    EGFRIFAFRI 84 06/15/2020    BCR 17 07/29/2022    K 5.0 07/29/2022    CO2 21 07/29/2022    CALCIUM 9.3 07/29/2022    PROTENTOTREF 6.8 07/29/2022    ALBUMIN 4.4 07/29/2022    LABIL2 1.8 07/29/2022    AST 16 07/29/2022    ALT 10 07/29/2022       Assessment:          Diagnosis Plan   1. Paroxysmal atrial fibrillation (HCC)               Plan:       1.  Paroxysmal atrial fibrillation- continue metoprolol and Eliquis, [id number] score of 3, complete resolution of symptoms since on this medical regimen  2.  Hypertension-good control today, he is on amlodipine, no change made today to his regimen.  3.  Obstructive sleep apnea- severe, AHI index greater than 60, to start CPAP today.    Thank you very much for allowing us to participate in the care of this pleasant patient.  Please don't hesitate to call if I can be of assistance in any way.      Current Outpatient Medications:   •  apixaban (ELIQUIS) 5 MG tablet tablet, Take 1 tablet by mouth 2 (Two) Times a Day., Disp: 180 tablet, Rfl: 3  •  azelastine (ASTELIN) 0.1 % nasal spray, 2 sprays into the nostril(s) as directed by provider 2 (Two) Times a Day. Use in each nostril as directed, Disp: , Rfl:   •  celecoxib (CeleBREX) 200 MG capsule, Take 200 mg by mouth Daily., Disp: , Rfl:   •  metoprolol succinate XL (TOPROL-XL) 25 MG 24 hr tablet, Take 1 tablet by mouth 2 (Two) Times a Day., Disp: 180 tablet, Rfl: 3  •  metroNIDAZOLE (METROCREAM) 0.75 % cream, , Disp: , Rfl:   •  Misc Natural Products (Glucosamine Chond Cmp Advanced) tablet, Take 1 tablet by mouth Daily., Disp: , Rfl:   •  Probiotic Product (PROBIOTIC PO), Take 1 tablet by mouth Daily., Disp: , Rfl:   •  timolol (TIMOPTIC) 0.5 % ophthalmic solution, , Disp: , Rfl:

## 2022-10-28 RX ORDER — METOPROLOL SUCCINATE 25 MG/1
25 TABLET, EXTENDED RELEASE ORAL 2 TIMES DAILY
Qty: 180 TABLET | Refills: 3 | Status: SHIPPED | OUTPATIENT
Start: 2022-10-28

## 2023-05-01 ENCOUNTER — OFFICE VISIT (OUTPATIENT)
Dept: INTERNAL MEDICINE | Facility: CLINIC | Age: 88
End: 2023-05-01
Payer: MEDICARE

## 2023-05-01 VITALS
HEART RATE: 54 BPM | BODY MASS INDEX: 21.92 KG/M2 | WEIGHT: 148 LBS | HEIGHT: 69 IN | DIASTOLIC BLOOD PRESSURE: 70 MMHG | OXYGEN SATURATION: 99 % | SYSTOLIC BLOOD PRESSURE: 114 MMHG

## 2023-05-01 DIAGNOSIS — H61.23 BILATERAL IMPACTED CERUMEN: ICD-10-CM

## 2023-05-01 DIAGNOSIS — Z98.890 STATUS POST ARTHROSCOPY OF RIGHT KNEE: Primary | ICD-10-CM

## 2023-05-01 RX ORDER — DOCUSATE SODIUM 100 MG/1
CAPSULE, LIQUID FILLED ORAL
COMMUNITY
Start: 2023-03-17

## 2023-05-01 NOTE — PROGRESS NOTES
"  Bony Zhang D.O.  Internal Medicine  [facility]  [address], [address]  [place] [address]  [phone]      Chief Complaint  Referral to PT for knee replacement in 3/16/23    SUBJECTIVE    History of Present Illness    Steffen Leyva is a 87 y.o. male who presents to the office today as an established patient that last saw me on 7/29/2022.   Here today with his daughter who helps provide history.    Right knee surgery: reports March 16th had replacement of right knee at [facility] in [place]. He states that everything went as expected. He started home based therapy there and was released to do in person therapy but he was unable to start that due to moving back to [place] seasonally. States his knee is getting better every day, \"I'm real pleased, everything is going good\". States his overall weakness after surgery is improving. States he is drinking one Ensure generic from [employer] daily. States the right knee is swollen but is no worse than it has been. Pt states his surgeon in [place] cleared him \"totally\" before he came home.      Also states that his hearing has not been very well recently even with hearing aids. He would like me to look in his ears for wax buildup.    Allergies   Allergen Reactions   • Prednisolone Unknown - High Severity     High Eye pressure   • Oxycodone-Acetaminophen Nausea And Vomiting        Outpatient Medications Marked as Taking for the 5/1/23 encounter (Office Visit) with Bony Zhang, DO   Medication Sig Dispense Refill   • apixaban (ELIQUIS) 5 MG tablet tablet Take 1 tablet by mouth 2 (Two) Times a Day. 180 tablet 3   • azelastine (ASTELIN) 0.1 % nasal spray 2 sprays into the nostril(s) as directed by provider 2 (Two) Times a Day. Use in each nostril as directed     • metoprolol succinate XL (TOPROL-XL) 25 MG 24 hr tablet Take 1 tablet by mouth 2 (Two) Times a Day. 180 tablet 3   • metroNIDAZOLE (METROCREAM) 0.75 % cream      • Misc " "Natural Products (Glucosamine Chond Cmp Advanced) tablet Take 1 tablet by mouth Daily.     • Probiotic Product (PROBIOTIC PO) Take 1 tablet by mouth Daily.     • timolol (TIMOPTIC) 0.5 % ophthalmic solution           Past Medical History:   Diagnosis Date   • Arthritis    • Atrial fibrillation    • Cataract    • Corneal abrasion, left 07/23/2015    SEEN AT [facility] ER   • Elevated PSA 07/2015   • Glaucoma    • Hemorrhoids    • Hypertension    • IGT (impaired glucose tolerance) 07/2015   • Prostate cancer 07/2019       OBJECTIVE    Vital Signs:   /70   Pulse 54   Ht 175.3 cm (69\")   Wt 67.1 kg (148 lb)   SpO2 99%   BMI 21.86 kg/m²     Physical Exam  Vitals reviewed.   Constitutional:       General: He is not in acute distress.     Appearance: Normal appearance. He is normal weight. He is not ill-appearing.   HENT:      Right Ear: There is impacted cerumen.      Left Ear: There is impacted cerumen.      Ears:      Comments: hearing aids bilaterally  Eyes:      General: No scleral icterus.  Pulmonary:      Effort: Pulmonary effort is normal. No respiratory distress.   Musculoskeletal:        Legs:       Comments: Ambulating with cane    Neurological:      Mental Status: He is alert.   Psychiatric:         Mood and Affect: Mood normal.         Behavior: Behavior normal.         Thought Content: Thought content normal.                             ASSESSMENT & PLAN     Diagnoses and all orders for this visit:    1. Status post arthroscopy of right knee (Primary)  - reports March 16th had replacement of right knee at [facility] in [place]. He states that everything went as expected. He started home based therapy there and was released to do in person therapy but he was unable to start that due to moving back to [place] seasonally. States his knee is getting better every day, \"I'm real pleased, everything is going good\". States his overall weakness after surgery is improving. States he is drinking one " "Ensure generic from [employer] daily. States the right knee is swollen but is no worse than it has been. Pt states his surgeon in [place] cleared him \"totally\" before he came home.   -will attempt to obtain op notes, post op visit notes and labs from that hospital stay  -physical exam findings as documented above  -overall he reports improving pain and function  -will refer to physical therapy for further treatment post operatively ; weight is overall stable   -recommend he increase Ensure to 2 times daily to build strength and muscle back after surgical recovery   -     Ambulatory Referral to Physical Therapy Evaluate and treat    2. Bilateral impacted cerumen     -begin ear wax softening drops bilaterally           The following social determinates of health impact the patient's medical decision making: No social determinates of health were factored in to today's visit.     Follow Up  Return in about 3 months (around 8/1/2023) for Medicare Wellness.    Patient/family had no further questions at this time and verbalized understanding of the plan discussed today.   "

## 2023-05-17 ENCOUNTER — OFFICE VISIT (OUTPATIENT)
Dept: CARDIOLOGY | Facility: CLINIC | Age: 88
End: 2023-05-17
Payer: MEDICARE

## 2023-05-17 VITALS
HEART RATE: 56 BPM | WEIGHT: 146.7 LBS | DIASTOLIC BLOOD PRESSURE: 60 MMHG | SYSTOLIC BLOOD PRESSURE: 100 MMHG | HEIGHT: 69 IN | BODY MASS INDEX: 21.73 KG/M2

## 2023-05-17 DIAGNOSIS — G47.33 OSA (OBSTRUCTIVE SLEEP APNEA): ICD-10-CM

## 2023-05-17 DIAGNOSIS — I48.0 PAROXYSMAL ATRIAL FIBRILLATION: Primary | ICD-10-CM

## 2023-05-17 PROCEDURE — 1159F MED LIST DOCD IN RCRD: CPT | Performed by: INTERNAL MEDICINE

## 2023-05-17 PROCEDURE — 1160F RVW MEDS BY RX/DR IN RCRD: CPT | Performed by: INTERNAL MEDICINE

## 2023-05-17 PROCEDURE — 99214 OFFICE O/P EST MOD 30 MIN: CPT | Performed by: INTERNAL MEDICINE

## 2023-05-17 RX ORDER — METOPROLOL SUCCINATE 25 MG/1
25 TABLET, EXTENDED RELEASE ORAL DAILY
Qty: 90 TABLET | Refills: 3 | Status: SHIPPED | OUTPATIENT
Start: 2023-05-17

## 2023-05-17 NOTE — PROGRESS NOTES
Subjective:     Encounter Date:06/30/2022      Patient ID: Steffen Leyva is a 88 y.o. male.    Chief Complaint:  History of Present Illness      Dear Dr. Welch,    I had the pleasure of seeing this patient in the office today for follow-up of his cardiac status.  He has a history of paroxysmal atrial fibrillation    He had knee replacement in [place].  While there he was seen by cardiology, they performed a stress test and echocardiogram prior to the surgical intervention and both of these were fine.  He was in sinus rhythm throughout.    He denies any chest pain, pressure, tightness, squeezing, or heartburn.  He has not experienced any feeling of palpitations, tachycardia or heart racing and no presyncope or syncope.  There has not been any problems with dizziness or lightheadedness.  There has not been any orthopnea or PND, and no problems with lower extremity edema.  He denies any shortness of breath at rest or with activity and has not had any wheezing.  He has not had any problems with unexplained nausea or vomiting. He has continued to perform daily activities of living without any specific problem or change in the level of activity.  He has not been recently hospitalized for any reason.    He came to see us with complaint of palpitations and Holter monitor showed paroxysmal atrial fibrillation with a 4% A. fib burden.  He was started on metoprolol daily, still had some palpitations that were breaking through so he went to twice a day and this completely resolved his sensation of palpitations.  He was also started on Eliquis because of an elevated [id number] score as outlined below.    Patient then underwent sleep study which showed severe sleep apnea; he is now completely compliant on CPAP.      The following portions of the patient's history were reviewed and updated as appropriate: allergies, current medications, past family history, past medical history, past social history, past surgical history  "and problem list.    Past Medical History:   Diagnosis Date   • Arthritis    • Atrial fibrillation    • Cataract    • Corneal abrasion, left 07/23/2015    SEEN AT [facility] ER   • Elevated PSA 07/2015   • Glaucoma    • Hemorrhoids    • Hypertension    • IGT (impaired glucose tolerance) 07/2015   • Prostate cancer 07/2019       Past Surgical History:   Procedure Laterality Date   • COLONOSCOPY N/A 1994    NO RECORDS, OLD CHART NOT AVAILABLE, DR. DAHIANA CURTIS   • COLONOSCOPY N/A 12/07/2005    RIGHT COLON LESION, PATH: TUBULAR ADENOMA, SIGMOID DIVERTICULOSIS, DR. JENNY XIE AT [facility]   • COLONOSCOPY N/A 07/05/2018    Procedure: COLONOSCOPY to cecum with hot snare polypectomy with resolution clip x 1, and spot tattoo marker;  Surgeon: Cathi Coto MD;  [facility];  Service: Gastroenterology   • COLONOSCOPY N/A 07/10/2020    Procedure: COLONOSCOPY INTO CECUM AND NORMAL TI WITH BX;  Surgeon: Jose Francisco Rodriguez MD;  Location: [facility];  Service: Gastroenterology;  Laterality: N/A;  PRE: DIARRHEA   POST: DIVERTICULOSIS, INK AND SCAR-POST POLYPECTOMY SITE, HEMORRHOIDS    • ENDOSCOPY N/A 07/10/2020    Procedure: ESOPHAGOGASTRODUODENOSCOPY WITH BX;  Surgeon: Jose Francisco Rodriguez MD;  [facility];  Service: Gastroenterology;  Laterality: N/A;  PRE: POSITIVE CELIAC ANTIBODIES   POST: NORMAL   • EYE SURGERY Bilateral 2012    RELIEF OF ELEVATED INTRAOCULAR PRESSURE, PERFORMED IN [facility] [place] [facility]   • HEMORRHOID BANDING N/A 01/19/2006    DR. DAHIANA CURTIS   • HEMORRHOID BANDING N/A 1980    NO RECORD AVAILABLE   • KNEE ARTHROPLASTY Right 2013    D/T MENISCUS TEAR, DR. SANTACRUZ IN [place], [place]   • KNEE ARTHROSCOPY Left 1980   • REPLACEMENT TOTAL KNEE           Procedures       Objective:     Vitals:    05/17/23 0957   BP: 100/60   Pulse: 56   Weight: 66.5 kg (146 lb 11.2 oz)   Height: 175.3 cm (69\")         Physical Exam  Constitutional:       General: He is not in acute distress.     " Appearance: He is well-developed. He is not diaphoretic.   HENT:      Head: Normocephalic and atraumatic.      Nose: Nose normal.   Eyes:      General:         Right eye: No discharge.         Left eye: No discharge.      Conjunctiva/sclera: Conjunctivae normal.      Pupils: Pupils are equal, round, and reactive to light.   Neck:      Thyroid: No thyromegaly.      Trachea: No tracheal deviation.   Cardiovascular:      Rate and Rhythm: Normal rate and regular rhythm.      Pulses: Normal pulses.      Heart sounds: Normal heart sounds, S1 normal and S2 normal.     No S3 sounds.   Pulmonary:      Effort: Pulmonary effort is normal. No respiratory distress.      Breath sounds: Normal breath sounds. No stridor.   Chest:      Chest wall: No tenderness.   Abdominal:      General: Bowel sounds are normal. There is no distension.      Palpations: Abdomen is soft. There is no mass.      Tenderness: There is no abdominal tenderness. There is no guarding or rebound.   Musculoskeletal:         General: No tenderness or deformity. Normal range of motion.      Cervical back: Normal range of motion and neck supple.   Lymphadenopathy:      Cervical: No cervical adenopathy.   Skin:     General: Skin is warm and dry.      Findings: No erythema or rash.   Neurological:      Mental Status: He is alert and oriented to person, place, and time.      Deep Tendon Reflexes: Reflexes are normal and symmetric.   Psychiatric:         Thought Content: Thought content normal.       CHADS-VASc Risk Assessment            3 Total Score    1 Hypertension    2 Age >/= 75        Criteria that do not apply:    CHF    DM    PRIOR STROKE/TIA/THROMBO    Vascular Disease    Age 65-74    Sex: Female          Lab Review:               Results for orders placed during the hospital encounter of 07/20/16    Adult transthoracic echo complete    Interpretation Summary  · All left ventricular wall segments contract normally.  · Left ventricular function is normal.  Estimated EF = 61%.  · Mild tricuspid valve regurgitation is present.    Lab Results   Component Value Date    GLUCOSE 89 07/29/2022    BUN 16 07/29/2022    CREATININE 0.94 07/29/2022    EGFRIFNONA 69 06/15/2020    EGFRIFAFRI 84 06/15/2020    BCR 17 07/29/2022    K 5.0 07/29/2022    CO2 21 07/29/2022    CALCIUM 9.3 07/29/2022    PROTENTOTREF 6.8 07/29/2022    ALBUMIN 4.4 07/29/2022    LABIL2 1.8 07/29/2022    AST 16 07/29/2022    ALT 10 07/29/2022       Assessment:          Diagnosis Plan   1. Paroxysmal atrial fibrillation        2. SHELTON (obstructive sleep apnea)               Plan:       1.  Paroxysmal atrial fibrillation- continue metoprolol and Eliquis, [id number] score of 3, he is on metoprolol succinate twice daily, will decrease that to 25 mg once daily  2.  Hypertension-good control today,  3.  Obstructive sleep apnea- severe, AHI index greater than 60, highly compliant with CPAP    Thank you very much for allowing us to participate in the care of this pleasant patient.  Please don't hesitate to call if I can be of assistance in any way.      Current Outpatient Medications:   •  apixaban (ELIQUIS) 5 MG tablet tablet, Take 1 tablet by mouth 2 (Two) Times a Day., Disp: 180 tablet, Rfl: 3  •  azelastine (ASTELIN) 0.1 % nasal spray, 2 sprays into the nostril(s) as directed by provider 2 (Two) Times a Day. Use in each nostril as directed, Disp: , Rfl:   •  celecoxib (CeleBREX) 200 MG capsule, Take 1 capsule by mouth Daily., Disp: , Rfl:   •  docusate sodium (COLACE) 100 MG capsule, TAKE 1 CAPSULE BY MOUTH THREE TIMES A DAY WITH MEALS, Disp: , Rfl:   •  metoprolol succinate XL (TOPROL-XL) 25 MG 24 hr tablet, Take 1 tablet by mouth Daily., Disp: 90 tablet, Rfl: 3  •  Misc Natural Products (Glucosamine Chond Cmp Advanced) tablet, Take 1 tablet by mouth Daily., Disp: , Rfl:   •  Probiotic Product (PROBIOTIC PO), Take 1 tablet by mouth Daily., Disp: , Rfl:   •  timolol (TIMOPTIC) 0.5 % ophthalmic solution, , Disp: ,  Rfl:

## 2023-08-07 ENCOUNTER — OFFICE VISIT (OUTPATIENT)
Dept: INTERNAL MEDICINE | Facility: CLINIC | Age: 88
End: 2023-08-07
Payer: MEDICARE

## 2023-08-07 VITALS
HEIGHT: 69 IN | TEMPERATURE: 97.4 F | OXYGEN SATURATION: 99 % | BODY MASS INDEX: 21.33 KG/M2 | HEART RATE: 62 BPM | SYSTOLIC BLOOD PRESSURE: 110 MMHG | DIASTOLIC BLOOD PRESSURE: 70 MMHG | WEIGHT: 144 LBS

## 2023-08-07 DIAGNOSIS — Z00.00 ANNUAL PHYSICAL EXAM: ICD-10-CM

## 2023-08-07 DIAGNOSIS — H61.20 CERUMEN IN AUDITORY CANAL ON EXAMINATION: ICD-10-CM

## 2023-08-07 DIAGNOSIS — Z11.4 ENCOUNTER FOR SCREENING FOR HIV: ICD-10-CM

## 2023-08-07 DIAGNOSIS — R41.3 MEMORY LOSS: ICD-10-CM

## 2023-08-07 DIAGNOSIS — Z00.00 MEDICARE ANNUAL WELLNESS VISIT, SUBSEQUENT: Primary | ICD-10-CM

## 2023-08-07 PROCEDURE — 99397 PER PM REEVAL EST PAT 65+ YR: CPT | Performed by: STUDENT IN AN ORGANIZED HEALTH CARE EDUCATION/TRAINING PROGRAM

## 2023-08-07 PROCEDURE — 99214 OFFICE O/P EST MOD 30 MIN: CPT | Performed by: STUDENT IN AN ORGANIZED HEALTH CARE EDUCATION/TRAINING PROGRAM

## 2023-08-07 PROCEDURE — G0439 PPPS, SUBSEQ VISIT: HCPCS | Performed by: STUDENT IN AN ORGANIZED HEALTH CARE EDUCATION/TRAINING PROGRAM

## 2023-08-07 PROCEDURE — 1170F FXNL STATUS ASSESSED: CPT | Performed by: STUDENT IN AN ORGANIZED HEALTH CARE EDUCATION/TRAINING PROGRAM

## 2023-08-07 NOTE — PROGRESS NOTES
"The ABCs of the Annual Wellness Visit  Subsequent Medicare Wellness Visit    Subjective      Steffen Leyva is a 88 y.o. male who presents for a Subsequent Medicare Wellness Visit.    The following portions of the patient's history were reviewed and   updated as appropriate: allergies, current medications, past family history, past medical history, past social history, past surgical history, and problem list.    Glaucoma: follows with ophthalmologist, takes timolol drops    Allergies: takes astelin nasal spray as needed, States he has had an allergy to alpha gal several years ago and previously followed with allergist Dr Bishop. He has not eaten meat since that time.      Arthritis: right knee, s/p total knee replacement 2023.States he is dealing with weakness and has completed physical therapy after his knee replacement . He is now going to a workout facility and doing swimming and he believes he is making progress.      Prostate cancer: diagnosed 2019 with intermediate-risk prostate cancer, PSA was 7.4 on March 5 2019, received radiation, Lupron and Casodex. Radiation oncologist was Dr Alexis Del Valle with [facility], urologist is Piotr Stiles at [facility]. Pt states he was told no longer needs follow ups or lab work \"just age old and die\".     sleep apnea: compliant with CPAP nightly     paroxysmal atrial fibrillation/HTN: follows with [facility]. Anticoagulated with apixaban 5 mg twice daily. BP and HR control with metoprolol succinate 25 mg daily    States \"I have some memory problems now\". He classifies his issue as \"moderate\". Names seem to be an issue for him and he remembers once he is reminded of them. States he tends to get lost when he driving. States he has no issues with finances or numbers. He lives with his wife in their house. States his memory is \"long term\". States he would have to think about who the  is but would have to think about who it is. He is eventually able " "to say \"J Carlos Kirkpatrick\" correctly.         Compared to one year ago, the patient feels his physical   health is better.    Compared to one year ago, the patient feels his mental   health is the same.    Recent Hospitalizations:  He was admitted within the past 365 days at a hospital in [place] for Knee replacement. He doesn't recall the name.       Current Medical Providers:  Patient Care Team:  Bony Zhang DO as PCP - General (Internal Medicine)  Iain Carey III, MD as Consulting Physician (Cardiology)  Dallas Gamboa MD as Consulting Physician (Dermatology)  Vinnie Stiles MD as Consulting Physician (Ophthalmology)  Alexis Del Valle MD (Oncology)  Juanito Bishop MD as Consulting Physician (Allergy and Immunology)  Jose Francisco Rodriguez MD as Consulting Physician (Gastroenterology)    Outpatient Medications Prior to Visit   Medication Sig Dispense Refill    apixaban (ELIQUIS) 5 MG tablet tablet Take 1 tablet by mouth 2 (Two) Times a Day. 180 tablet 3    azelastine (ASTELIN) 0.1 % nasal spray 2 sprays into the nostril(s) as directed by provider 2 (Two) Times a Day. Use in each nostril as directed      metoprolol succinate XL (TOPROL-XL) 25 MG 24 hr tablet Take 1 tablet by mouth Daily. 90 tablet 3    Misc Natural Products (Glucosamine Chond Cmp Advanced) tablet Take 1 tablet by mouth Daily.      Probiotic Product (PROBIOTIC PO) Take 1 tablet by mouth Daily.      timolol (TIMOPTIC) 0.5 % ophthalmic solution       celecoxib (CeleBREX) 200 MG capsule Take 1 capsule by mouth Daily. (Patient not taking: Reported on 8/7/2023)      docusate sodium (COLACE) 100 MG capsule TAKE 1 CAPSULE BY MOUTH THREE TIMES A DAY WITH MEALS (Patient not taking: Reported on 8/7/2023)       No facility-administered medications prior to visit.       No opioid medication identified on active medication list. I have reviewed chart for other potential  high risk medication/s and harmful drug interactions in the elderly.        Aspirin is " "not on active medication list.  Aspirin use is contraindicated for this patient due to: current use of Eliquis.  .    Patient Active Problem List   Diagnosis    Arthritis    Abnormal prostate specific antigen    Borderline diabetes    Chronic anterior uveitis    Change in bowel habits    Functional diarrhea    Adenomatous polyp of ascending colon    Abdominal bloating    Celiac disease    Malignant neoplasm of prostate    Primary open angle glaucoma (POAG)    Squamous cell carcinoma of skin    Paroxysmal atrial fibrillation    SHELTON (obstructive sleep apnea)     Advance Care Planning   Advance Care Planning     Advance Directive is on file.  ACP discussion was held with the patient during this visit. Patient has an advance directive in EMR which is still valid.      Objective    Vitals:    08/07/23 0754   BP: 110/70   Pulse: 62   Temp: 97.4 øF (36.3 øC)   TempSrc: Infrared   SpO2: 99%   Weight: 65.3 kg (144 lb)   Height: 175.3 cm (69\")     Physical Exam  Vitals reviewed.   Constitutional:       General: He is not in acute distress.     Appearance: Normal appearance. He is normal weight. He is not ill-appearing.   HENT:      Head: Normocephalic and atraumatic.      Right Ear: Ear canal and external ear normal.      Left Ear: Ear canal and external ear normal.      Ears:      Comments: Wearing hearing aids bilaterally  Partial cerumen impaction in both ears limits exam of TM. The visualized TM appears normal.     Mouth/Throat:      Mouth: Mucous membranes are moist.      Pharynx: No oropharyngeal exudate or posterior oropharyngeal erythema.   Eyes:      General: No scleral icterus.     Extraocular Movements: Extraocular movements intact.      Conjunctiva/sclera: Conjunctivae normal.      Pupils: Pupils are equal, round, and reactive to light.   Cardiovascular:      Rate and Rhythm: Normal rate and regular rhythm.      Heart sounds: Normal heart sounds. No murmur heard.  Pulmonary:      Effort: Pulmonary effort is " "normal. No respiratory distress.      Breath sounds: Normal breath sounds. No wheezing.   Abdominal:      General: Bowel sounds are normal. There is no distension.      Palpations: Abdomen is soft.      Tenderness: There is no abdominal tenderness. There is no guarding.   Musculoskeletal:      Cervical back: Neck supple. No tenderness.      Right lower leg: No edema.      Left lower leg: No edema.        Legs:    Lymphadenopathy:      Cervical: No cervical adenopathy.   Skin:     General: Skin is warm and dry.      Coloration: Skin is not jaundiced.   Neurological:      General: No focal deficit present.      Mental Status: He is alert and oriented to person, place, and time.      Cranial Nerves: No cranial nerve deficit.      Motor: No weakness.   Psychiatric:         Mood and Affect: Mood normal.         Behavior: Behavior normal.      Comments: For certain names he has a few minutes delay obtaining the information          Estimated body mass index is 21.27 kg/mý as calculated from the following:    Height as of this encounter: 175.3 cm (69\").    Weight as of this encounter: 65.3 kg (144 lb).    BMI is within normal parameters. No other follow-up for BMI required.      Does the patient have evidence of cognitive impairment?   No            HEALTH RISK ASSESSMENT    Smoking Status:  Social History     Tobacco Use   Smoking Status Former    Packs/day: 1.00    Years: 29.00    Pack years: 29.00    Types: Cigarettes    Quit date: 7/10/1985    Years since quittin.1   Smokeless Tobacco Never     Alcohol Consumption:  Social History     Substance and Sexual Activity   Alcohol Use Yes    Alcohol/week: 7.0 standard drinks    Types: 7 Standard drinks or equivalent per week     Fall Risk Screen:    ROCKYADI Fall Risk Assessment was completed, and patient is at MODERATE risk for falls. Assessment completed on:2023    Depression Screenin/7/2023     8:01 AM   PHQ-2/PHQ-9 Depression Screening   Little Interest or " Pleasure in Doing Things 0-->not at all   Feeling Down, Depressed or Hopeless 0-->not at all   PHQ-9: Brief Depression Severity Measure Score 0       Health Habits and Functional and Cognitive Screenin/7/2023     7:58 AM   Functional & Cognitive Status   Do you have difficulty preparing food and eating? No   Do you have difficulty bathing yourself, getting dressed or grooming yourself? No   Do you have difficulty using the toilet? No   Do you have difficulty moving around from place to place? No   Do you have trouble with steps or getting out of a bed or a chair? No   Current Diet Well Balanced Diet        Dental Exam Comment Denture   Eye Exam Up to date   Exercise (times per week) 4 times per week   Current Exercises Include Bicycling Outdoors        Exercise Comment swimming   Do you need help using the phone?  No   Are you deaf or do you have serious difficulty hearing?  Yes   Do you need help to go to places out of walking distance? No   Do you need help shopping? No   Do you need help preparing meals?  No   Do you need help with housework?  No   Do you need help with laundry? No   Do you need help taking your medications? No   Do you need help managing money? No   Do you ever drive or ride in a car without wearing a seat belt? No   Have you felt unusual stress, anger or loneliness in the last month? No   Who do you live with? Spouse   If you need help, do you have trouble finding someone available to you? No   Do you have difficulty concentrating, remembering or making decisions? Yes       Age-appropriate Screening Schedule:  Refer to the list below for future screening recommendations based on patient's age, sex and/or medical conditions. Orders for these recommended tests are listed in the plan section. The patient has been provided with a written plan.    Health Maintenance   Topic Date Due    TDAP/TD VACCINES (1 - Tdap) Never done    COVID-19 Vaccine (5 - Moderna series) 2022    INFLUENZA  VACCINE  10/01/2023    ANNUAL WELLNESS VISIT  08/07/2024    Pneumococcal Vaccine 65+  Completed    ZOSTER VACCINE  Completed                  CMS Preventative Services Quick Reference  Risk Factors Identified During Encounter:    Fall Risk-High or Moderate:  offered referral to PT but he decline  Hearing Problem:  wearing hearing aids  Immunizations Discussed/Encouraged: Td and COVID19  Dental Screening Recommended  Vision Screening Recommended    The above risks/problems have been discussed with the patient.  Pertinent information has been shared with the patient in the After Visit Summary.      Follow Up:   Next Medicare Wellness visit to be scheduled in 1 year.      An After Visit Summary and PPPS were made available to the patient.      Patient's annual Complete Physical Exam was also completed on this date:   -Updated and reviewed past medical, family, social and surgical histories as well as allergies. Addressed care gaps listed in the medical record. Reviewed and updated medication list.   -Encouraged minimum of 30 minutes or more of exercise at a brisk walk or higher 5 days per week.  -Immunizations reviewed and updated in EMR.  -Physical exam findings documented above  Other Pertinent Preventative Topics Reviewed:   -Lipid screening:  aged out of screening  -Aspirin for primary or secondary prevention: Not applicable, patient is greater than age 60 and risks outweigh benefits for primary prevention.  -Diabetes screening:  Screening not indicated at this time.   -Abdominal aortic aneurysm screening: aged out of screening  -Hypertension screening: Patient with known diagnosis of hypertension and is receiving treatment.  -HIV screening: Patient is over age 65, screening not indicated.   -Syphilis screening: Syphilis screening not indicated.  -Hepatitis B virus screening: Screening not indicated, not in a high-risk group.  -Hepatitis C virus screening:  Patient age greater than 79 years old, screening not  "indicated.  -Colon cancer screening: aged out of screening   -Lung cancer screening: Patient has smoked but does not meet other eligibility criteria for screening.  -Prostate cancer screening: known prostate cancer    A problem-based visit was also conducted on the same day, see below for assessment and plan    Diagnoses and all orders for this visit:    1. Memory loss (Primary)  -States \"I have some memory problems now\". He classifies his issue as \"moderate\". Names seem to be an issue for him and he remembers once he is reminded of them. States he tends to get lost when he driving. States he has no issues with finances or numbers. He lives with his wife in their house. States his memory is \"long term\". States he would have to think about who the  is but would have to think about who it is. He is eventually able to say \"J Carlos Kirkpatrick\" correctly. .  -MMSE 28/30 in office today, overall normal  -advised him this could be normal aging memory changes  -offered him CT scan of the head and referral to neuropsc for memory eval but he declines at this time  -will obtain basic workup labs for memory loss as below to screening for secondary causes   -     Vitamin B12  -     TSH Rfx On Abnormal To Free T4  -     T. Pallidum (Syphilis) Screening Cascade  -     HIV-1 / O / 2 Ag / Antibody 4th Generation    2. Cerumen in auditory canal on examination  -begin OTC ear wax softening drops             The following social determinates of health impact the patient's medical decision making: No social determinates of health were factored in to today's visit.     Follow Up  Return in about 6 months (around 2/7/2024) for Recheck.      "

## 2023-08-08 LAB
ALBUMIN SERPL-MCNC: 4.5 G/DL (ref 3.5–5.2)
ALBUMIN/GLOB SERPL: 1.8 G/DL
ALP SERPL-CCNC: 95 U/L (ref 39–117)
ALT SERPL-CCNC: 15 U/L (ref 1–41)
AST SERPL-CCNC: 15 U/L (ref 1–40)
BASOPHILS # BLD AUTO: 0.07 10*3/MM3 (ref 0–0.2)
BASOPHILS NFR BLD AUTO: 1.3 % (ref 0–1.5)
BILIRUB SERPL-MCNC: 0.4 MG/DL (ref 0–1.2)
BUN SERPL-MCNC: 16 MG/DL (ref 8–23)
BUN/CREAT SERPL: 15.1 (ref 7–25)
CALCIUM SERPL-MCNC: 9.9 MG/DL (ref 8.6–10.5)
CHLORIDE SERPL-SCNC: 97 MMOL/L (ref 98–107)
CO2 SERPL-SCNC: 27 MMOL/L (ref 22–29)
CREAT SERPL-MCNC: 1.06 MG/DL (ref 0.76–1.27)
EGFRCR SERPLBLD CKD-EPI 2021: 67.5 ML/MIN/1.73
EOSINOPHIL # BLD AUTO: 0.46 10*3/MM3 (ref 0–0.4)
EOSINOPHIL NFR BLD AUTO: 8.8 % (ref 0.3–6.2)
ERYTHROCYTE [DISTWIDTH] IN BLOOD BY AUTOMATED COUNT: 12.8 % (ref 12.3–15.4)
GLOBULIN SER CALC-MCNC: 2.5 GM/DL
GLUCOSE SERPL-MCNC: 100 MG/DL (ref 65–99)
HCT VFR BLD AUTO: 41.1 % (ref 37.5–51)
HGB BLD-MCNC: 13.9 G/DL (ref 13–17.7)
HIV 1+2 AB+HIV1 P24 AG SERPL QL IA: NON REACTIVE
IMM GRANULOCYTES # BLD AUTO: 0.01 10*3/MM3 (ref 0–0.05)
IMM GRANULOCYTES NFR BLD AUTO: 0.2 % (ref 0–0.5)
LYMPHOCYTES # BLD AUTO: 1.52 10*3/MM3 (ref 0.7–3.1)
LYMPHOCYTES NFR BLD AUTO: 29.2 % (ref 19.6–45.3)
MCH RBC QN AUTO: 32 PG (ref 26.6–33)
MCHC RBC AUTO-ENTMCNC: 33.8 G/DL (ref 31.5–35.7)
MCV RBC AUTO: 94.5 FL (ref 79–97)
MONOCYTES # BLD AUTO: 0.77 10*3/MM3 (ref 0.1–0.9)
MONOCYTES NFR BLD AUTO: 14.8 % (ref 5–12)
NEUTROPHILS # BLD AUTO: 2.38 10*3/MM3 (ref 1.7–7)
NEUTROPHILS NFR BLD AUTO: 45.7 % (ref 42.7–76)
NRBC BLD AUTO-RTO: 0 /100 WBC (ref 0–0.2)
PLATELET # BLD AUTO: 348 10*3/MM3 (ref 140–450)
POTASSIUM SERPL-SCNC: 5 MMOL/L (ref 3.5–5.2)
PROT SERPL-MCNC: 7 G/DL (ref 6–8.5)
RBC # BLD AUTO: 4.35 10*6/MM3 (ref 4.14–5.8)
SODIUM SERPL-SCNC: 135 MMOL/L (ref 136–145)
TREPONEMA PALLIDUM IGG+IGM AB [PRESENCE] IN SERUM OR PLASMA BY IMMUNOASSAY: NON REACTIVE
TSH SERPL DL<=0.005 MIU/L-ACNC: 3.22 UIU/ML (ref 0.27–4.2)
VIT B12 SERPL-MCNC: 316 PG/ML (ref 211–946)
WBC # BLD AUTO: 5.21 10*3/MM3 (ref 3.4–10.8)

## 2023-09-27 ENCOUNTER — OFFICE VISIT (OUTPATIENT)
Dept: INTERNAL MEDICINE | Facility: CLINIC | Age: 88
End: 2023-09-27
Payer: MEDICARE

## 2023-09-27 VITALS
SYSTOLIC BLOOD PRESSURE: 118 MMHG | DIASTOLIC BLOOD PRESSURE: 64 MMHG | HEART RATE: 56 BPM | HEIGHT: 69 IN | OXYGEN SATURATION: 99 % | WEIGHT: 141 LBS | BODY MASS INDEX: 20.88 KG/M2

## 2023-09-27 DIAGNOSIS — U07.1 COVID-19 VIRUS INFECTION: ICD-10-CM

## 2023-09-27 DIAGNOSIS — D64.9 NORMOCYTIC ANEMIA: ICD-10-CM

## 2023-09-27 DIAGNOSIS — Z09 HOSPITAL DISCHARGE FOLLOW-UP: Primary | ICD-10-CM

## 2023-09-27 LAB
BASOPHILS # BLD AUTO: 0.07 10*3/MM3 (ref 0–0.2)
BASOPHILS NFR BLD AUTO: 1.6 % (ref 0–1.5)
EOSINOPHIL # BLD AUTO: 0.17 10*3/MM3 (ref 0–0.4)
EOSINOPHIL NFR BLD AUTO: 3.9 % (ref 0.3–6.2)
ERYTHROCYTE [DISTWIDTH] IN BLOOD BY AUTOMATED COUNT: 12.6 % (ref 12.3–15.4)
FERRITIN SERPL-MCNC: 92.8 NG/ML (ref 30–400)
FOLATE SERPL-MCNC: 16.5 NG/ML (ref 4.78–24.2)
HCT VFR BLD AUTO: 39.6 % (ref 37.5–51)
HGB BLD-MCNC: 13.2 G/DL (ref 13–17.7)
IMM GRANULOCYTES # BLD AUTO: 0.01 10*3/MM3 (ref 0–0.05)
IMM GRANULOCYTES NFR BLD AUTO: 0.2 % (ref 0–0.5)
IRON SATN MFR SERPL: 32 % (ref 20–50)
IRON SERPL-MCNC: 98 MCG/DL (ref 59–158)
LYMPHOCYTES # BLD AUTO: 1.39 10*3/MM3 (ref 0.7–3.1)
LYMPHOCYTES NFR BLD AUTO: 31.8 % (ref 19.6–45.3)
MCH RBC QN AUTO: 32.6 PG (ref 26.6–33)
MCHC RBC AUTO-ENTMCNC: 33.3 G/DL (ref 31.5–35.7)
MCV RBC AUTO: 97.8 FL (ref 79–97)
MONOCYTES # BLD AUTO: 0.58 10*3/MM3 (ref 0.1–0.9)
MONOCYTES NFR BLD AUTO: 13.3 % (ref 5–12)
NEUTROPHILS # BLD AUTO: 2.15 10*3/MM3 (ref 1.7–7)
NEUTROPHILS NFR BLD AUTO: 49.2 % (ref 42.7–76)
PLATELET # BLD AUTO: 469 10*3/MM3 (ref 140–450)
RBC # BLD AUTO: 4.05 10*6/MM3 (ref 4.14–5.8)
TIBC SERPL-MCNC: 307 MCG/DL
UIBC SERPL-MCNC: 209 MCG/DL (ref 112–346)
WBC # BLD AUTO: 4.37 10*3/MM3 (ref 3.4–10.8)

## 2023-09-27 NOTE — PROGRESS NOTES
Bony Zhang D.O.  Internal Medicine  [facility]  [address], [address]  [place] [address]  [phone]      Chief Complaint  Follow-up (On COVID)    SUBJECTIVE    History of Present Illness    Steffen Leyva is a 88 y.o. male who presents to the office today as an established patient that last saw me on 8/7/2023.     Here to follow up on abnormal labs from recent hospitalization at [facility] for fatigue where he was diagnosed with COVID 19 infection. States it was the sickest he has been in his life and was hospitalized at 1 day. States it took 2 weeks for him to feel better and tested negative last week. He lost 10 lbs during COVID infection and has now gained 2 lbs back and is starting to feel a little better. States mentally he feels clear now. States he is still weak but is getting some strength back .     Allergies   Allergen Reactions    Prednisolone Unknown - High Severity     High Eye pressure    Oxycodone-Acetaminophen Nausea And Vomiting        Outpatient Medications Marked as Taking for the 9/27/23 encounter (Office Visit) with Bony Zhang, DO   Medication Sig Dispense Refill    apixaban (ELIQUIS) 5 MG tablet tablet Take 1 tablet by mouth 2 (Two) Times a Day. 180 tablet 3    azelastine (ASTELIN) 0.1 % nasal spray 2 sprays into the nostril(s) as directed by provider 2 (Two) Times a Day. Use in each nostril as directed      metoprolol succinate XL (TOPROL-XL) 25 MG 24 hr tablet Take 1 tablet by mouth Daily. 90 tablet 3    Misc Natural Products (Glucosamine Chond Cmp Advanced) tablet Take 1 tablet by mouth Daily.      Probiotic Product (PROBIOTIC PO) Take 1 tablet by mouth Daily.      timolol (TIMOPTIC) 0.5 % ophthalmic solution           Past Medical History:   Diagnosis Date    Arthritis     Atrial fibrillation     Cataract     Corneal abrasion, left 07/23/2015    SEEN AT [facility]    Elevated PSA 07/2015    Glaucoma     Hemorrhoids     Hypertension     IGT (impaired glucose  "tolerance) 2015    Prostate cancer 2019       OBJECTIVE    Vital Signs:   /64   Pulse 56   Ht 175.3 cm (69\")   Wt 64 kg (141 lb)   SpO2 99%   BMI 20.82 kg/m²     Physical Exam  Vitals reviewed.   Constitutional:       General: He is not in acute distress.     Appearance: Normal appearance. He is normal weight. He is not ill-appearing.   HENT:      Head: Normocephalic and atraumatic.   Eyes:      General: No scleral icterus.  Cardiovascular:      Rate and Rhythm: Normal rate and regular rhythm.      Heart sounds: Normal heart sounds. No murmur heard.  Pulmonary:      Effort: Pulmonary effort is normal. No respiratory distress.      Breath sounds: Normal breath sounds. No wheezing or rhonchi.   Musculoskeletal:      Right lower leg: No edema.      Left lower leg: No edema.   Skin:     Coloration: Skin is not jaundiced.   Neurological:      Mental Status: He is alert.   Psychiatric:         Mood and Affect: Mood normal.         Behavior: Behavior normal.         Thought Content: Thought content normal.          The following data was reviewed by: Bony Zhang DO on 2023:    Data reviewed : Recent hospitalization notes from [facility]          Discharge Summaries  - documented in this encounter  Leslie Tabares MD - 2023 12:35 PM EDT  Formatting of this note is different from the original.  [facility] Discharge Summary  Patient Identification:  Name: Steffen Leyva  Age: 88 yr/o  Sex: male  : 1935  MRN: [id number]    Admit date: 2023    Discharge date and time: 2023    Admitting Physician: Leslie Tabares MD    Primary Care Provider: System, Provider Not In    Discharge Physician: Leslie Tabares MD    Disposition:  Home or Self Care    Discharged Condition: stable    Admission Diagnoses:  General weakness [R53.1]    Discharge Diagnoses:  Patient Active Problem List  Diagnosis Date Noted   General weakness 2023   Paroxysmal atrial fibrillation " 2023   COVID 2023    Consults:  IP CONSULT TO HOSPITALIST    Procedures:  See below    Significant Diagnostic Studies:  CT Abdomen & Pelvis W IV Contrast Without Oral Contrast    Result Date: 2023  REVIEWING YOUR TEST RESULTS IN [facility] IS NOT A SUBSTITUTE FOR DISCUSSING THOSE RESULTS WITH YOUR HEALTH CARE PROVIDER. PLEASE CONTACT YOUR PROVIDER [facility] TO DISCUSS ANY QUESTIONS OR CONCERNS YOU MAY HAVE REGARDING THESE TEST RESULTS. RADIOLOGY REPORT FACILITY: [facility] UNIT/AGE/GENDER: J.ED ER AGE:88 Y SEX:M PATIENT NAME/: AMANDA ARRIOLA R 1935 UNIT NUMBER: [id number] ACCOUNT NUMBER: [id number] ACCESSION NUMBER: [id number] CT OF THE ABDOMEN AND PELVIS WITH CONTRAST DATE: 2023 COMPARISON: May 24, 2019 INDICATION: Nausea and vomiting. Abdominal bloating. TECHNIQUE: Axial images of the abdomen and pelvis were obtained after injection of intravenous contrast. Radiation dose reduction techniques were utilized per ALARA protocol. FINDINGS: Liver, gallbladder, pancreas, spleen, adrenal glands, kidneys, and urinary bladder are normal. Radiation markers in the prostate gland. Few colonic diverticula without acute diverticulitis. There are no dilated or thickened loops of bowel. The appendix is normal. No free air, free fluid, or abscess. Small hiatal hernia. Calcific atherosclerosis including involvement of at least the right coronary artery. Air cysts in the lower lobes. Mild patchy infiltrates of the right middle lobe may be due to early pneumonia or bronchiolitis. Atelectasis in the left lung base. IMPRESSION: 1. No acute abnormalities in the abdomen and pelvis. Negative for bowel obstruction. 2. Mild patchy infiltrates in the right middle lobe could be due to early pneumonia or bronchiolitis. 3. Other chronic findings are discussed above. Dictated by: Martin Bishop M.D. Images and Report reviewed and interpreted by: Martin Bishop M.D.  <PS><Electronically signed by: Martin Bishop M.D.> 09/08/2023 0819 D: 09/08/2023 0812 T: 09/08/2023 0812    Discharge Medications:  Current Discharge Medication List    START taking these medications  Details  ondansetron (ZOFRAN) 4 MG tablet Take 1 tablet by mouth every 6 (six) hours as needed for Nausea.  Qty: 30 tablet, Refills: 0      CONTINUE these medications which have NOT CHANGED  Details  apixaban (ELIQUIS) 5 MG tablet Take 5 mg by mouth 2 (two) times daily.    metoprolol (LOPRESSOR) 25 MG tablet Take 25 mg by mouth 2 (two) times daily.        Patient Instructions:    To-Do List  Future Orders  Activity as tolerated - no restrictions  Advance to home diet as tolerated  Call doctor for: difficulty breathing or shortness of breath  Call doctor for: extreme fatigue  Call doctor for: headache or visual disturbances  Call doctor for: persistent dizziness or light-headedness  Call doctor for: persistent nausea or vomiting  Call doctor for: severe cramping  Call doctor for: severe uncontrolled pain  Discharge instructions (specify)      Hospital Course:  Please see admission history and physical for patient's initial presentation. During this hospitalization, the following problems were addressed...    He is an 88 year old with arthritis, PAF on Eliquis, SHELTON, prostate cancer and HTN who presents with weakness, vomiting. It started in the past 24 hours. He vomited from 2a to 5am. He felt completely worn down and was unable to do anything. He presented here. CT abd was negative. He is Covid + but not hypoxic. He has not vomited since admission and starting to feel better. He received fluids overnight and PT/OT evaluated him. He feels improved. Hb dropped some but likely dilutional. No bleeding. I recommended close PCP follow up for recheck and to return if symptoms persist or worsen. He is anxious to be discharged home and states he has good family support.    Vitals:  09/09/23 1150  BP: (!)  142/66  Pulse:  Resp: 16  Temp: 99.4 °F (37.4 °C)    Physical Exam  Constitutional:  Appearance: He is not diaphoretic.  HENT:  Head: Normocephalic and atraumatic.  Eyes:  Conjunctiva/sclera: Conjunctivae normal.  Cardiovascular:  Rate and Rhythm: Normal rate and regular rhythm.  Pulmonary:  Effort: Pulmonary effort is normal. No respiratory distress.  Breath sounds: Normal breath sounds. No wheezing.  Musculoskeletal:  General: Normal range of motion.  Cervical back: Normal range of motion.  Skin:  General: Skin is warm.  Neurological:  Mental Status: He is alert and oriented to person, place, and time.    Signed:  Leslie Tabares MD  [facility]  9/9/2023  12:35 PM    Time: >30 minutes  Electronically signed by Leslie Tabares MD at 09/09/2023 12:36 PM EDT    ASSESSMENT & PLAN     Diagnoses and all orders for this visit:    1. Hospital discharge follow-up (Primary)  2. COVID-19 virus infection  3. Normocytic anemia  -Patient presents today for hospital discharge follow-up.  He was hospitalized in early September at [facility] for weakness and diagnosed with COVID-19 infection.  Per review of the discharge summary, he seems to have received supportive care only as he did not develop hypoxia.  He is slowly beginning to feel better in terms of fatigue and mental clarity.  Not quite back to his baseline yet.  Per review of the hospital discharge summary, he was to follow-up with me for a CBC recheck as his hemoglobin dropped some that was felt to be iatrogenic.  He had no source of active bleeding.  Per review of the outside records, his hemoglobin dropped from 13.9 to 10.8 over 1-day.  MCV was on the upper edge of normal at 96.3.  Of note, patient already had evaluation of his vitamin B12 and TSH with me in August 2023 and those were normal.  I will repeat CBC as well as obtain iron profile and folate today.  Further plan depending on results.          The following social determinates of health  impact the patient's medical decision making: No social determinates of health were factored in to today's visit.     Follow Up  No follow-ups on file.    Patient/family had no further questions at this time and verbalized understanding of the plan discussed today.

## 2023-11-03 RX ORDER — METOPROLOL SUCCINATE 25 MG/1
25 TABLET, EXTENDED RELEASE ORAL DAILY
Qty: 90 TABLET | Refills: 3 | Status: SHIPPED | OUTPATIENT
Start: 2023-11-03

## 2023-11-03 NOTE — TELEPHONE ENCOUNTER
Caller: AMANDA    Relationship: SELF    Best call back number: [phone]    Additional details provided by patient: REQUESTING A ONE MONTH SUPPLY OF ELIQUIS AT THIS TIME ONLY DUE TO THE DONUT HOLE.  PT WOULD ALSO LIKE TO CHECK IF HE CAN DROP DOWN TO ONE TABLET A DAY OF THE ELIQUIS    Requested Prescriptions:   Requested Prescriptions     Pending Prescriptions Disp Refills    apixaban (ELIQUIS) 5 MG tablet tablet 180 tablet 3     Sig: Take 1 tablet by mouth 2 (Two) Times a Day.    metoprolol succinate XL (TOPROL-XL) 25 MG 24 hr tablet 90 tablet 3     Sig: Take 1 tablet by mouth Daily.        Pharmacy where request should be sent: [id number] [place] [facility] [place] - [address]  - [phone] [facility] [phone]      Last office visit with prescribing clinician: 5/17/2023   Last telemedicine visit with prescribing clinician: Visit date not found   Next office visit with prescribing clinician: 5/22/2024       Does the patient have less than a 3 day supply:  [] Yes  [x] No    Would you like a call back once the refill request has been completed: [x] Yes [] No    If the office needs to give you a call back, can they leave a voicemail: [] Yes [] No    Carmen Barrios Rep   11/03/23 13:34 EDT

## 2023-12-05 ENCOUNTER — TELEPHONE (OUTPATIENT)
Dept: CARDIOLOGY | Facility: CLINIC | Age: 88
End: 2023-12-05
Payer: MEDICARE

## 2023-12-05 NOTE — TELEPHONE ENCOUNTER
Mr. Bourne has called and was concerned of a medication he is currently taking being to strong. Pt would like to stop taking 5mg of Eliquis and take 2.5mg. Pt states he has had no episodes of afib and does not believe he needs 5 mg of Eliquis. Please advise?

## 2023-12-07 NOTE — TELEPHONE ENCOUNTER
Pt advised and verbalized understanding.     Pt then wanted to confirm that he is still suppose to take the Eliquis 5mg PO BID .

## 2024-02-02 RX ORDER — METOPROLOL SUCCINATE 25 MG/1
25 TABLET, EXTENDED RELEASE ORAL DAILY
Qty: 90 TABLET | Refills: 1 | Status: SHIPPED | OUTPATIENT
Start: 2024-02-02

## 2024-05-06 ENCOUNTER — OFFICE VISIT (OUTPATIENT)
Dept: INTERNAL MEDICINE | Facility: CLINIC | Age: 89
End: 2024-05-06
Payer: MEDICARE

## 2024-05-06 VITALS
HEIGHT: 69 IN | WEIGHT: 144 LBS | OXYGEN SATURATION: 98 % | HEART RATE: 57 BPM | BODY MASS INDEX: 21.33 KG/M2 | DIASTOLIC BLOOD PRESSURE: 80 MMHG | SYSTOLIC BLOOD PRESSURE: 130 MMHG

## 2024-05-06 DIAGNOSIS — I10 PRIMARY HYPERTENSION: Primary | ICD-10-CM

## 2024-05-06 DIAGNOSIS — Z91.018 ALLERGY TO ALPHA-GAL: ICD-10-CM

## 2024-05-06 DIAGNOSIS — Z91.014 ALLERGY TO MAMMALIAN MEATS: ICD-10-CM

## 2024-05-06 DIAGNOSIS — I48.0 PAROXYSMAL ATRIAL FIBRILLATION: ICD-10-CM

## 2024-05-06 PROCEDURE — G2211 COMPLEX E/M VISIT ADD ON: HCPCS | Performed by: STUDENT IN AN ORGANIZED HEALTH CARE EDUCATION/TRAINING PROGRAM

## 2024-05-06 PROCEDURE — 99214 OFFICE O/P EST MOD 30 MIN: CPT | Performed by: STUDENT IN AN ORGANIZED HEALTH CARE EDUCATION/TRAINING PROGRAM

## 2024-05-11 LAB
ALPHA-GAL IGE QN: 9.26 KU/L
BASOPHILS # BLD AUTO: 0 X10E3/UL (ref 0–0.2)
BASOPHILS NFR BLD AUTO: 1 %
BEEF IGE QN: 2.49 KU/L
BUN SERPL-MCNC: 16 MG/DL (ref 8–27)
BUN/CREAT SERPL: 15 (ref 10–24)
CALCIUM SERPL-MCNC: 9.2 MG/DL (ref 8.6–10.2)
CHLORIDE SERPL-SCNC: 99 MMOL/L (ref 96–106)
CO2 SERPL-SCNC: 24 MMOL/L (ref 20–29)
CONV CLASS DESCRIPTION: ABNORMAL
CREAT SERPL-MCNC: 1.04 MG/DL (ref 0.76–1.27)
EGFRCR SERPLBLD CKD-EPI 2021: 69 ML/MIN/1.73
EOSINOPHIL # BLD AUTO: 0.2 X10E3/UL (ref 0–0.4)
EOSINOPHIL NFR BLD AUTO: 4 %
ERYTHROCYTE [DISTWIDTH] IN BLOOD BY AUTOMATED COUNT: 12.3 % (ref 11.6–15.4)
GLUCOSE SERPL-MCNC: 110 MG/DL (ref 70–99)
HCT VFR BLD AUTO: 41 % (ref 37.5–51)
HGB BLD-MCNC: 14 G/DL (ref 13–17.7)
IGE SERPL-ACNC: 447 IU/ML (ref 6–495)
IMM GRANULOCYTES # BLD AUTO: 0 X10E3/UL (ref 0–0.1)
IMM GRANULOCYTES NFR BLD AUTO: 0 %
LAMB IGE QN: 1.36 KU/L
LYMPHOCYTES # BLD AUTO: 1.6 X10E3/UL (ref 0.7–3.1)
LYMPHOCYTES NFR BLD AUTO: 34 %
MCH RBC QN AUTO: 32.3 PG (ref 26.6–33)
MCHC RBC AUTO-ENTMCNC: 34.1 G/DL (ref 31.5–35.7)
MCV RBC AUTO: 95 FL (ref 79–97)
MONOCYTES # BLD AUTO: 0.6 X10E3/UL (ref 0.1–0.9)
MONOCYTES NFR BLD AUTO: 12 %
NEUTROPHILS # BLD AUTO: 2.2 X10E3/UL (ref 1.4–7)
NEUTROPHILS NFR BLD AUTO: 49 %
PLATELET # BLD AUTO: 308 X10E3/UL (ref 150–450)
PORK IGE QN: 1.14 KU/L
POTASSIUM SERPL-SCNC: 4.8 MMOL/L (ref 3.5–5.2)
RBC # BLD AUTO: 4.33 X10E6/UL (ref 4.14–5.8)
SODIUM SERPL-SCNC: 136 MMOL/L (ref 134–144)
WBC # BLD AUTO: 4.6 X10E3/UL (ref 3.4–10.8)

## 2024-05-22 ENCOUNTER — OFFICE VISIT (OUTPATIENT)
Dept: CARDIOLOGY | Facility: CLINIC | Age: 89
End: 2024-05-22
Payer: MEDICARE

## 2024-05-22 VITALS
WEIGHT: 141.9 LBS | SYSTOLIC BLOOD PRESSURE: 120 MMHG | BODY MASS INDEX: 21.02 KG/M2 | DIASTOLIC BLOOD PRESSURE: 65 MMHG | HEART RATE: 53 BPM | HEIGHT: 69 IN

## 2024-05-22 DIAGNOSIS — I48.0 PAROXYSMAL ATRIAL FIBRILLATION: Primary | ICD-10-CM

## 2024-05-22 DIAGNOSIS — G47.33 OSA (OBSTRUCTIVE SLEEP APNEA): ICD-10-CM

## 2024-05-22 PROBLEM — U09.9 LONG COVID: Status: ACTIVE | Noted: 2024-05-22

## 2024-05-22 PROCEDURE — 99214 OFFICE O/P EST MOD 30 MIN: CPT | Performed by: INTERNAL MEDICINE

## 2024-05-22 PROCEDURE — 1159F MED LIST DOCD IN RCRD: CPT | Performed by: INTERNAL MEDICINE

## 2024-05-22 PROCEDURE — 93000 ELECTROCARDIOGRAM COMPLETE: CPT | Performed by: INTERNAL MEDICINE

## 2024-05-22 PROCEDURE — 1160F RVW MEDS BY RX/DR IN RCRD: CPT | Performed by: INTERNAL MEDICINE

## 2024-05-22 NOTE — PROGRESS NOTES
Subjective:     Encounter Date:06/30/2022      Patient ID: Steffen Leyva is a 89 y.o. male.    Chief Complaint:  History of Present Illness      Dear Dr. Welch,    I had the pleasure of seeing this patient in the office today for follow-up of his cardiac status.  He has a history of paroxysmal atrial fibrillation    No cardiac issues.  No palpitations or tachycardia.  No chest pain or chest discomfort.  No shortness of breath.    Biggest issue is that he had COVID last fall.  He has had long COVID symptoms ever since.  Significant fatigue, significant vestibular symptoms.  Has been working with physical therapy.  Has not had any falls with trauma.    He came to see us with complaint of palpitations and Holter monitor showed paroxysmal atrial fibrillation with a 4% A. fib burden.  He was started on metoprolol daily, still had some palpitations that were breaking through so he went to twice a day and this completely resolved his sensation of palpitations.  He was also started on Eliquis because of an elevated [id number] score as outlined below.    Patient then underwent sleep study which showed severe sleep apnea; he is now completely compliant on CPAP.      The following portions of the patient's history were reviewed and updated as appropriate: allergies, current medications, past family history, past medical history, past social history, past surgical history and problem list.    Past Medical History:   Diagnosis Date    Arthritis     Atrial fibrillation     Cataract     Corneal abrasion, left 07/23/2015    SEEN AT [facility]    COVID-19 09/2023    Elevated PSA 07/2015    Glaucoma     Hemorrhoids     Hypertension     IGT (impaired glucose tolerance) 07/2015    Prostate cancer 07/2019       Past Surgical History:   Procedure Laterality Date    COLONOSCOPY N/A 1994    NO RECORDS, OLD CHART NOT AVAILABLE, DR. DAHIANA CURTIS    COLONOSCOPY N/A 12/07/2005    RIGHT COLON LESION, PATH: TUBULAR ADENOMA, SIGMOID  "DIVERTICULOSIS, DR. JENNY XIE AT [facility]    COLONOSCOPY N/A 07/05/2018    Procedure: COLONOSCOPY to cecum with hot snare polypectomy with resolution clip x 1, and spot tattoo marker;  Surgeon: Cathi Coto MD;  [facility];  Service: Gastroenterology    COLONOSCOPY N/A 07/10/2020    Procedure: COLONOSCOPY INTO CECUM AND NORMAL TI WITH BX;  Surgeon: Jose Francisco Rodriguez MD;  Location:  [facility];  Service: Gastroenterology;  Laterality: N/A;  PRE: DIARRHEA   POST: DIVERTICULOSIS, INK AND SCAR-POST POLYPECTOMY SITE, HEMORRHOIDS     ENDOSCOPY N/A 07/10/2020    Procedure: ESOPHAGOGASTRODUODENOSCOPY WITH BX;  Surgeon: Jose Francisco Rodriguez MD;  Location: [facility] ENDOSCOPY;  Service: Gastroenterology;  Laterality: N/A;  PRE: POSITIVE CELIAC ANTIBODIES   POST: NORMAL    EYE SURGERY Bilateral 2012    RELIEF OF ELEVATED INTRAOCULAR PRESSURE, PERFORMED IN [facility]    HEMORRHOID BANDING N/A 01/19/2006    DR. DAHIANA CURTIS    HEMORRHOID BANDING N/A 1980    NO RECORD AVAILABLE    KNEE ARTHROPLASTY Right 2013    D/T MENISCUS TEAR, DR. SANTACRUZ IN [place], [place]    KNEE ARTHROSCOPY Left 1980    REPLACEMENT TOTAL KNEE      TOTAL KNEE ARTHROPLASTY Right 03/2023           ECG 12 Lead    Date/Time: 5/22/2024 12:59 PM  Performed by: Iain Carey III, MD    Authorized by: Iain Carey III, MD  Comparison: compared with previous ECG   Similar to previous ECG  Rhythm: sinus rhythm  Rate: normal  Conduction: conduction normal  ST Segments: ST segments normal  T Waves: T waves normal  QRS axis: normal  Other: no other findings    Clinical impression: normal ECG             Objective:     Vitals:    05/22/24 1002   BP: 120/65   BP Location: Left arm   Pulse: 53   Weight: 64.4 kg (141 lb 14.4 oz)   Height: 175.3 cm (69\")           Physical Exam  Constitutional:       General: He is not in acute distress.     Appearance: He is well-developed. He is not diaphoretic.   HENT:      Head: Normocephalic and " atraumatic.      Nose: Nose normal.   Eyes:      General:         Right eye: No discharge.         Left eye: No discharge.      Conjunctiva/sclera: Conjunctivae normal.      Pupils: Pupils are equal, round, and reactive to light.   Neck:      Thyroid: No thyromegaly.      Trachea: No tracheal deviation.   Cardiovascular:      Rate and Rhythm: Normal rate and regular rhythm.      Pulses: Normal pulses.      Heart sounds: Normal heart sounds, S1 normal and S2 normal.      No S3 sounds.   Pulmonary:      Effort: Pulmonary effort is normal. No respiratory distress.      Breath sounds: Normal breath sounds. No stridor.   Chest:      Chest wall: No tenderness.   Abdominal:      General: Bowel sounds are normal. There is no distension.      Palpations: Abdomen is soft. There is no mass.      Tenderness: There is no abdominal tenderness. There is no guarding or rebound.   Musculoskeletal:         General: No tenderness or deformity. Normal range of motion.      Cervical back: Normal range of motion and neck supple.   Lymphadenopathy:      Cervical: No cervical adenopathy.   Skin:     General: Skin is warm and dry.      Findings: No erythema or rash.   Neurological:      Mental Status: He is alert and oriented to person, place, and time.      Deep Tendon Reflexes: Reflexes are normal and symmetric.   Psychiatric:         Thought Content: Thought content normal.       CHADS-VASc Risk Assessment              3 Total Score    1 Hypertension    2 Age >/= 75        Criteria that do not apply:    CHF    DM    PRIOR STROKE/TIA/THROMBO    Vascular Disease    Age 65-74    Sex: Female            Lab Review:               Results for orders placed during the hospital encounter of 07/20/16    Adult transthoracic echo complete    Interpretation Summary  · All left ventricular wall segments contract normally.  · Left ventricular function is normal. Estimated EF = 61%.  · Mild tricuspid valve regurgitation is present.    Lab Results    Component Value Date    GLUCOSE 110 (H) 05/06/2024    BUN 16 05/06/2024    CREATININE 1.04 05/06/2024    EGFRIFNONA 69 06/15/2020    EGFRIFAFRI 84 06/15/2020    BCR 15 05/06/2024    K 4.8 05/06/2024    CO2 24 05/06/2024    CALCIUM 9.2 05/06/2024    PROTENTOTREF 7.0 08/07/2023    ALBUMIN 4.5 08/07/2023    LABIL2 1.8 08/07/2023    AST 15 08/07/2023    ALT 15 08/07/2023       Assessment:          Diagnosis Plan   1. Paroxysmal atrial fibrillation  ECG 12 Lead      2. SHELTON (obstructive sleep apnea)  ECG 12 Lead               Plan:       1.  Paroxysmal atrial fibrillation- continue metoprolol and Eliquis, [id number]-UPQo score of 3,   2.  Hypertension-good control today,  3.  Obstructive sleep apnea- severe, AHI index greater than 60, highly compliant with CPAP  4.  Long COVID symptoms.    Thank you very much for allowing us to participate in the care of this pleasant patient.  Please don't hesitate to call if I can be of assistance in any way.      Current Outpatient Medications:     apixaban (ELIQUIS) 5 MG tablet tablet, Take 1 tablet by mouth 2 (Two) Times a Day., Disp: 180 tablet, Rfl: 3    metoprolol succinate XL (TOPROL-XL) 25 MG 24 hr tablet, Take 1 tablet by mouth Daily., Disp: 90 tablet, Rfl: 1    Misc Natural Products (Glucosamine Chond Cmp Advanced) tablet, Take 1 tablet by mouth Daily., Disp: , Rfl:     Probiotic Product (PROBIOTIC PO), Take 1 tablet by mouth Daily., Disp: , Rfl:     timolol (TIMOPTIC) 0.5 % ophthalmic solution, , Disp: , Rfl:

## 2024-07-16 ENCOUNTER — TELEPHONE (OUTPATIENT)
Dept: CARDIOLOGY | Facility: CLINIC | Age: 89
End: 2024-07-16
Payer: MEDICARE

## 2024-07-16 NOTE — TELEPHONE ENCOUNTER
Received Physicians Order from [place] for pt to receive CPAP machine and/or supplies.      Order is in Dr Ramos's inbox

## 2024-09-16 ENCOUNTER — OFFICE VISIT (OUTPATIENT)
Dept: INTERNAL MEDICINE | Facility: CLINIC | Age: 89
End: 2024-09-16
Payer: MEDICARE

## 2024-09-16 VITALS
HEIGHT: 69 IN | SYSTOLIC BLOOD PRESSURE: 134 MMHG | DIASTOLIC BLOOD PRESSURE: 82 MMHG | HEART RATE: 61 BPM | WEIGHT: 146.6 LBS | BODY MASS INDEX: 21.71 KG/M2 | OXYGEN SATURATION: 99 % | TEMPERATURE: 97.1 F

## 2024-09-16 DIAGNOSIS — Z91.018 ALLERGY TO ALPHA-GAL: ICD-10-CM

## 2024-09-16 DIAGNOSIS — G47.33 OSA (OBSTRUCTIVE SLEEP APNEA): ICD-10-CM

## 2024-09-16 DIAGNOSIS — R53.83 FATIGUE, UNSPECIFIED TYPE: ICD-10-CM

## 2024-09-16 DIAGNOSIS — Z00.00 MEDICARE ANNUAL WELLNESS VISIT, SUBSEQUENT: Primary | ICD-10-CM

## 2024-09-16 PROCEDURE — 96160 PT-FOCUSED HLTH RISK ASSMT: CPT | Performed by: STUDENT IN AN ORGANIZED HEALTH CARE EDUCATION/TRAINING PROGRAM

## 2024-09-16 PROCEDURE — G0439 PPPS, SUBSEQ VISIT: HCPCS | Performed by: STUDENT IN AN ORGANIZED HEALTH CARE EDUCATION/TRAINING PROGRAM

## 2024-09-16 PROCEDURE — 99213 OFFICE O/P EST LOW 20 MIN: CPT | Performed by: STUDENT IN AN ORGANIZED HEALTH CARE EDUCATION/TRAINING PROGRAM

## 2024-09-16 RX ORDER — EPINEPHRINE 0.3 MG/.3ML
0.3 INJECTION SUBCUTANEOUS ONCE AS NEEDED
Qty: 2 EACH | Refills: 1 | Status: SHIPPED | OUTPATIENT
Start: 2024-09-16

## 2024-09-17 LAB — TSH SERPL DL<=0.005 MIU/L-ACNC: 2.42 UIU/ML (ref 0.27–4.2)

## 2024-09-23 ENCOUNTER — DOCUMENTATION (OUTPATIENT)
Dept: SLEEP MEDICINE | Facility: HOSPITAL | Age: 89
End: 2024-09-23
Payer: MEDICARE

## 2024-10-07 ENCOUNTER — OFFICE VISIT (OUTPATIENT)
Dept: SLEEP MEDICINE | Facility: HOSPITAL | Age: 89
End: 2024-10-07
Payer: MEDICARE

## 2024-10-07 VITALS
BODY MASS INDEX: 21.98 KG/M2 | SYSTOLIC BLOOD PRESSURE: 170 MMHG | WEIGHT: 145 LBS | OXYGEN SATURATION: 99 % | HEIGHT: 68 IN | DIASTOLIC BLOOD PRESSURE: 78 MMHG | HEART RATE: 50 BPM

## 2024-10-07 DIAGNOSIS — G47.34 NOCTURNAL HYPOXIA: ICD-10-CM

## 2024-10-07 DIAGNOSIS — Z78.9 DIFFICULTY WITH CPAP USE: ICD-10-CM

## 2024-10-07 DIAGNOSIS — G47.33 OBSTRUCTIVE SLEEP APNEA, ADULT: Primary | ICD-10-CM

## 2024-10-07 PROCEDURE — G0463 HOSPITAL OUTPT CLINIC VISIT: HCPCS

## 2024-10-07 NOTE — PROGRESS NOTES
[facility]  [address]  [facility] [address]  [phone]    PCP: Bony Zhang DO    Reason for visit:  Sleep disorders: SHELTON    Steffen is a 89 y.o.male who was seen in the Sleep Disorders Center today. He was last seen 2022. Had study, got machine but no return visit. He is compliant with his CPAP and uses it nightly. He sleeps from 10:30pm to 8am. He uses a ffm and he would like to change it. He uses an average 5 hrs and then tends to take the mask off. He replaces his mask regularly. He wakes up rested but gets sleepy after lunch. JADYN not done. Typically his BP is OK.  [place] Sleepiness Scale is 10. Caffeine 1 per day. Alcohol 5 per week.    Steffen  reports that he quit smoking about 39 years ago. His smoking use included cigarettes. He started smoking about 68 years ago. He has a 29 pack-year smoking history. He has never used smokeless tobacco.    Pertinent Positive Review of Systems of pnd, cough  Rest of Review of Systems was negative as recorded in Sleep Questionnaire.    Patient  has a past medical history of Arthritis, Atrial fibrillation, Cataract, Corneal abrasion, left (07/23/2015), COVID-19 (09/2023), Elevated PSA (07/2015), Glaucoma, Hemorrhoids, Hypertension, IGT (impaired glucose tolerance) (07/2015), and Prostate cancer (07/2019).     Current Medications:    Current Outpatient Medications:     apixaban (ELIQUIS) 5 MG tablet tablet, Take 1 tablet by mouth 2 (Two) Times a Day., Disp: 180 tablet, Rfl: 3    EPINEPHrine (EpiPen 2-Nathan) 0.3 MG/0.3ML solution auto-injector injection, Inject 0.3 mL into the appropriate muscle as directed by prescriber 1 (One) Time As Needed (anaphlyxis .) for up to 4 doses. IF NEEDED FOR ALLERGIC REACTION. GO TO ER IMMEDIATELY AFTER ANY USE., Disp: 2 each, Rfl: 1    metoprolol succinate XL (TOPROL-XL) 25 MG 24 hr tablet, Take 1 tablet by mouth Daily., Disp: 90 tablet, Rfl: 1    Misc Natural Products (Glucosamine Chond Cmp Advanced)  "tablet, Take 1 tablet by mouth Daily., Disp: , Rfl:     Probiotic Product (PROBIOTIC PO), Take 1 tablet by mouth Daily., Disp: , Rfl:     timolol (TIMOPTIC) 0.5 % ophthalmic solution, , Disp: , Rfl:    also entered in Sleep Questionnaire         Vital Signs: /78   Pulse 50   Ht 172.7 cm (68\")   Wt 65.8 kg (145 lb)   SpO2 99%   BMI 22.05 kg/m²     Body mass index is 22.05 kg/m².       Tongue: Normal       Dentition: good       Pharynx: Posterior pharyngeal pillars are wide   Mallampatti: I (soft palate, uvula, fauces, and tonsillar pillars visible)        General: Alert. Cooperative. Well developed. No acute distress.             Head:  Normocephalic. Symmetrical. Atraumatic.              Nose: No septal deviation. No drainage.          Throat: No oral lesions. No thrush. Moist mucous membranes.    Chest Wall:  Normal shape. Symmetric expansion with respiration. No tenderness.             Neck:  Trachea midline.           Lungs:  Clear to auscultation bilaterally. No wheezes. No rhonchi. No rales. Respirations regular, even and unlabored.            Heart:  Regular rhythm and normal rate. Normal S1 and S2. No murmur.     Abdomen:  Soft, non-tender and non-distended. Normal bowel sounds. No masses.  Extremities:  Moves all extremities well. No edema.    Psychiatric: Normal mood and affect.    Diagnostic data available to date is as below and was reviewed on current visit:  10/7/22: The patient tolerated the home sleep testing with monitoring time of 347 minutes. The data obtained make this a technically adequate study. The apnea hypopneas index(AHI) was 62.9 per sleep hour. The AHI during supine position was 63.6 per sleep hour. Mean heart rate of 51.5 BPM. Snoring was noted 44.3% of sleep time. Lowest oxygen saturation during the study was 66%. Saturation below 89% was noted for 32.4 mins.     Lab Results   Component Value Date     09/27/2023    FERRITIN 92.80 09/27/2023       Most current " available usage data reviewed on 10/07/2024:  No scans are attached to the encounter or orders placed in the encounter.      DME Company: [employer]    Prescription to [facility] for replacement supplies as below:    New fit    New fit  Description Replacement    Nasal PILLOWS      A 7034 Nasal Pillows  every 3 mth    A 7033 Repl Nasal Pillows  2 per mth    Nasal MASK/CUSHION      A 7034 Nasal Mask/Cushion  every 3 mth    A 7032 Repl Nasal Mask/Cushion  2 per mth    Full Face MASK      A 7030 Full Face Mask  every 3 mth    A 7031 Repl Face Mask  1 per mth      A 4604 Heated Tubing  every 3 mth    A 7037 Standard Tubing  every 3 mth   x A 7035 Headgear  every 3 mth   x A 7046 Repl Humidifier Chamber  every 6 yrs   x A 7038 Disposable Filters  2 per mth   x A 7039 Non-disposable Filter  every 6 mth   x A 7036 Chin Strap  every 6 mth     No orders of the defined types were placed in this encounter.         Impression:  1. Obstructive sleep apnea, adult    2. Nocturnal hypoxia    3. Difficulty with CPAP use        Plan:  Steffen will replace his mask with [place] and get different style. He has not seen us since initial setup. Needs JADYN. His AHI is mildly elevated. May need higher pr but overall much better from dx study, therefore await JADYN and keep pr same.    I reiterated the importance of effective treatment of obstructive sleep apnea with PAP machine.  Cardiovascular health risks of untreated sleep apnea were again reviewed.  Patient was asked to remain cautious if there is persistent hypersomnolence. The benefit of weight loss in reducing severity of obstructive sleep apnea was discussed.  Patient would benefit from adhering to a strict diet to achieve ideal BMI.     Change of PAP supplies regularly is important for effective use.  Change of cushion on the mask or plugs on nasal pillows along with disposable filters once every month and change of mask frame, tubing, headgear and Velcro straps every 6 months at the minimum was  reiterated.    This patient is compliant with PAP machine and benefits from its use.  Apnea hypopneas index is corrected/improved.  Daytime hypersomnolence has resolved.     Patient will follow up in this clinic in 6 months  APRN - he is going to [place] for the winter and cannot return in 3 months.    Thank you for allowing me to participate in your patient's care.    Electronically signed by Darren Irby MD, 10/07/24, 1:18 PM EDT.    Part of this note may be an electronic transcription/translation of spoken language to printed text using the Dragon Dictation System.

## 2024-10-23 ENCOUNTER — DOCUMENTATION (OUTPATIENT)
Dept: SLEEP MEDICINE | Facility: HOSPITAL | Age: 89
End: 2024-10-23
Payer: MEDICARE

## 2024-10-23 ENCOUNTER — TELEPHONE (OUTPATIENT)
Dept: SLEEP MEDICINE | Facility: HOSPITAL | Age: 89
End: 2024-10-23
Payer: MEDICARE

## 2024-10-23 NOTE — PROGRESS NOTES
CHAN Ceron sent me a message asking me to contact Pt about his Oximetry/pressure changes.  Pt stated that he did not want any changes made to his therapy device and that he is happy with his pressures as of now.

## 2024-10-23 NOTE — TELEPHONE ENCOUNTER
----- Message from Alysha BARFIELD sent at 10/23/2024 10:55 AM EDT -----  Regarding: RE: Ovn oximetry/pressure changes.  Pt stated that he does not want any changes made and is happy with his current therapy.  ----- Message -----  From: Jie Alamo APRN  Sent: 10/22/2024   4:28 PM EDT  To: Darren Irby MD; Alysha Underwood  Subject: Ovn oximetry/pressure changes.                   Overnight oximetry done recently shows very mild desaturation, <88% 5 min total sleep time. I notice that AHI on recent download is also mildly elevated at 7.5/hr. Let's reach out to the patient, Inform him and see if he agrees to raise CPAP pressures a little bit. If he says yes, please raise pressure to 8-15cm H2O.    Thanks.  ----- Message -----  From: Alysha Underwood  Sent: 10/18/2024   2:12 PM EDT  To: Darren Irby MD; CHAN Carrizales    Hi YORDAN Ceron did that as well :)  ----- Message -----  From: Jie Alamo APRN  Sent: 10/18/2024   1:56 PM EDT  To: Darren Irby MD; Alysha Encarnacion, can you please also scan the copy of the overnight oximetry report?  ----- Message -----  From: Alysha Underwood  Sent: 10/17/2024   8:16 AM EDT  To: Darren Irby MD; CHAN Carrizales I printed off a 30 day download from [facility]  for this pt and have sent it to scan for you to read per your request.    KAREN/CAROLYNE Encarnacion

## 2024-11-04 ENCOUNTER — DOCUMENTATION (OUTPATIENT)
Dept: SLEEP MEDICINE | Facility: HOSPITAL | Age: 89
End: 2024-11-04
Payer: MEDICARE

## 2024-11-04 ENCOUNTER — TELEPHONE (OUTPATIENT)
Dept: SLEEP MEDICINE | Facility: HOSPITAL | Age: 89
End: 2024-11-04
Payer: MEDICARE

## 2024-11-04 NOTE — PROGRESS NOTES
JADYN performed 10/15/2024 shows no significant desaturation below 89% on CPAP device.    Electronically signed by Darren Irby MD, 11/04/24, 9:48 AM EST.

## 2024-11-05 ENCOUNTER — DOCUMENTATION (OUTPATIENT)
Dept: SLEEP MEDICINE | Facility: HOSPITAL | Age: 89
End: 2024-11-05
Payer: MEDICARE

## 2024-11-05 NOTE — PROGRESS NOTES
Spoke to patient about the results that were released from Dr. Irby about his JADYN on 10/15/2024.  No changes are to be made at this time on Pt's therapy device.

## 2024-12-18 RX ORDER — METOPROLOL SUCCINATE 25 MG/1
25 TABLET, EXTENDED RELEASE ORAL DAILY
Qty: 90 TABLET | Refills: 3 | Status: SHIPPED | OUTPATIENT
Start: 2024-12-18

## 2025-01-03 RX ORDER — APIXABAN 5 MG/1
5 TABLET, FILM COATED ORAL 2 TIMES DAILY
Qty: 180 TABLET | Refills: 3 | Status: SHIPPED | OUTPATIENT
Start: 2025-01-03

## 2025-02-08 NOTE — PATIENT INSTRUCTIONS
History   No chief complaint on file.    HPI    History obtained from mother.    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  He has had cough for the last 5 to 6 days, becoming more wet in the last 2 to 3 days with associated congestion.  He did not sleep very well last night due to congestion.  He felt warm to mom but she has taken his temperature multiple times and he has never been higher than 99 degrees.  Decreased appetite but still drinking enough to make good wet diapers.  Not tiring/sweating with feeds.  Mom describes his eyes looking a little more sleepy but he is still very playful and alert during the day.  He has not had any vomiting.    PMHx:  History reviewed. No pertinent past medical history.  Past Surgical History:   Procedure Laterality Date    ARTERIAL SWITCH INFANT N/A 2023    Procedure: STERNOTOMY, ARTERIAL SWITCH OPERATION on cardiopulmonary bypass, transesophageal echocardiogram by Dr Brown;  Surgeon: Chio Ricketts MD;  Location: [facility] OR    IR PICC PLACEMENT < 5 YRS OF AGE  1/4/2024     These were reviewed with the patient/family.    MEDICATIONS were reviewed and are as follows:   No current facility-administered medications for this encounter.     Current Outpatient Medications   Medication Sig Dispense Refill    acetaminophen (TYLENOL) 160 MG/5ML elixir Take 4.5 mLs (144 mg) by mouth every 6 hours as needed for fever or pain. 237 mL 0    cholecalciferol (D-VI-SOL, VITAMIN D3) 10 mcg/mL (400 units/mL) LIQD liquid 0.5 mLs (5 mcg) by Per Feeding Tube route daily (Patient not taking: Reported on 11/15/2024) 15 mL 1       ALLERGIES:  Patient has no known allergies.      Physical Exam   Pulse: 122  Temp: 98.2  F (36.8  C)  Resp: (!) 40  Weight: 10.5 kg (23 lb 3.1 oz)  SpO2: 100 %       Physical Exam  Constitutional:       General: He is active. He is not in acute distress.     Appearance: He is    Medicare Wellness  Personal Prevention Plan of Service     Date of Office Visit:    Encounter Provider:  Bony Zhang DO  Place of Service:  [facility] PR[facility]  Patient Name: Steffen Leyva  :  1935    As part of the Medicare Wellness portion of your visit today, we are providing you with this personalized preventive plan of services (PPPS). This plan is based upon recommendations of the [place] Preventive Services Task Force (USPSTF) and the Advisory Committee on Immunization Practices (ACIP).    This lists the preventive care services that should be considered, and provides dates of when you are due. Items listed as completed are up-to-date and do not require any further intervention.    Health Maintenance   Topic Date Due    COVID-19 Vaccine (1) Never done    TDAP/TD VACCINES (1 - Tdap) Never done    Pneumococcal Vaccine 65+ (1 - PCV) Never done    INFLUENZA VACCINE  10/01/2022    ANNUAL WELLNESS VISIT  2023    ZOSTER VACCINE  Completed       Orders Placed This Encounter   Procedures    Comprehensive metabolic panel     Order Specific Question:   Release to patient     Answer:   Immediate    TSH Rfx On Abnormal To Free T4     Order Specific Question:   Release to patient     Answer:   Immediate    Ambulatory Referral to Sleep Medicine     Referral Priority:   Routine     Number of Visits Requested:   1    CBC w AUTO Differential     Order Specific Question:   Manual Differential     Answer:   No       No follow-ups on file.           well-developed. He is not toxic-appearing.   HENT:      Head: Normocephalic.      Right Ear: Tympanic membrane, ear canal and external ear normal.      Left Ear: Tympanic membrane, ear canal and external ear normal.      Nose: Congestion present.      Mouth/Throat:      Mouth: Mucous membranes are moist.      Pharynx: Oropharynx is clear.   Eyes:      General:         Right eye: No discharge.         Left eye: No discharge.      Pupils: Pupils are equal, round, and reactive to light.      Comments: Making tears.   Cardiovascular:      Rate and Rhythm: Normal rate and regular rhythm.      Pulses: Normal pulses.      Heart sounds: Normal heart sounds.      Comments: Well-healed sternotomy scar present.  Pulmonary:      Effort: Pulmonary effort is normal. No respiratory distress, nasal flaring or retractions.      Breath sounds: Normal breath sounds. No stridor or decreased air movement. No wheezing, rhonchi or rales.   Abdominal:      General: Abdomen is flat. Bowel sounds are normal. There is no distension.      Palpations: Abdomen is soft. There is no mass.      Tenderness: There is no abdominal tenderness.   Musculoskeletal:         General: Normal range of motion.      Cervical back: Normal range of motion and neck supple.   Lymphadenopathy:      Cervical: No cervical adenopathy.   Skin:     General: Skin is warm.      Capillary Refill: Capillary refill takes less than 2 seconds.   Neurological:      General: No focal deficit present.      Mental Status: He is alert.         ED Course        Procedures    Results for orders placed or performed during the hospital encounter of 02/08/25   Influenza A/B, RSV and SARS-CoV2 PCR (COVID-19) Nasopharyngeal     Status: Normal    Specimen: Nasopharyngeal; Swab   Result Value Ref Range    Influenza A PCR Negative Negative    Influenza B PCR Negative Negative    RSV PCR Negative Negative    SARS CoV2 PCR Negative Negative    Narrative    Testing was performed using the Xpert  Xpress CoV2/Flu/RSV Assay on the [employer] GeneXpert Instrument. This test should be ordered for the detection of SARS-CoV2, influenza, and RSV viruses in individuals with signs and symptoms of respiratory tract infection. This test is for in vitro diagnostic use under the US FDA for laboratories certified under CLIA to perform high or moderate complexity testing. This test has been US FDA cleared. A negative result does not rule out the presence of PCR inhibitors in the specimen or target RNA in concentration below the limit of detection for the assay. If only one viral target is positive but coinfection with multiple targets is suspected, the sample should be re-tested with another FDA cleared, approved, or authorized test, if coninfection would change clinical management. This test was validated by the [facility]. These laboratories are certified under the Clinical Laboratory Improvement Amendments of 1988 (CLIA-88) as qualified to perfom high complexity laboratory testing.       Medications - No data to display    Critical care time:  none    Medical Decision Making  The patient's presentation was of moderate complexity (an acute illness with systemic symptoms).    The patient's evaluation involved:  review of external note(s) from 3+ sources (see separate area of note for details)  ordering and/or review of 1 test(s) in this encounter (see separate area of note for details)    The patient's management necessitated only low risk treatment.    Assessment & Plan   Ngoc is a(n) 13 month old with history of repaired TGA presenting with 6 days of cough without fever, consistent with viral illness.  On exam, he is well-appearing without signs of respiratory distress or dehydration.  Hemodynamically stable.  He has not had any fevers and this is likely an evolving viral illness.  RSV/flu/COVID negative, sample sent for full viral panel.  No signs of secondary infection including acute otitis or  pneumonia today.  Recommend discharge home with continued symptomatic cares including Tylenol, ibuprofen, and levofloxacin.  Return precautions given for new or worsening fever, symptoms of dehydration, or respiratory distress.  Mom is comfortable with plan and all questions answered.      Discharge Medication List as of 2/8/2025 10:52 AM        START taking these medications    Details   acetaminophen (TYLENOL) 160 MG/5ML elixir Take 4.5 mLs (144 mg) by mouth every 6 hours as needed for fever or pain., Disp-237 mL, R-0, E-Prescribe             Final diagnoses:   Viral URI with cough     Paige Sahu MD  Pediatrics PGY-3  [facility]    This data was collected with the resident physician working in the Emergency Department. I saw and evaluated the patient and repeated the key portions of the history and physical exam. The plan of care has been discussed with the patient and family by me or by the resident under my supervision. I have read and edited the entire note. Shira Lopez MD    Portions of this note may have been created using voice recognition software. Please excuse transcription errors.     2/8/2025   [facility] EMERGENCY DEPARTMENT     Shira Lopez MD  02/09/25 [id number]

## 2025-04-30 ENCOUNTER — OFFICE VISIT (OUTPATIENT)
Dept: SLEEP MEDICINE | Facility: HOSPITAL | Age: OVER 89
End: 2025-04-30
Payer: MEDICARE

## 2025-04-30 ENCOUNTER — TELEPHONE (OUTPATIENT)
Dept: SLEEP MEDICINE | Facility: HOSPITAL | Age: OVER 89
End: 2025-04-30
Payer: MEDICARE

## 2025-04-30 VITALS
OXYGEN SATURATION: 96 % | SYSTOLIC BLOOD PRESSURE: 110 MMHG | WEIGHT: 147 LBS | HEART RATE: 62 BPM | HEIGHT: 68 IN | DIASTOLIC BLOOD PRESSURE: 68 MMHG | BODY MASS INDEX: 22.28 KG/M2

## 2025-04-30 DIAGNOSIS — G47.33 OBSTRUCTIVE SLEEP APNEA, ADULT: Primary | ICD-10-CM

## 2025-04-30 PROCEDURE — G0463 HOSPITAL OUTPT CLINIC VISIT: HCPCS

## 2025-04-30 NOTE — PROGRESS NOTES
"[facility]  Telephone: [phone] / Fax: [phone] [place]  Telephone: [phone] / Fax: [phone] Vanessa Chong    PCP: Bony Zhang DO    Reason for visit: [place] f/u    Steffen Leyva is a 89 y.o.male  was last seen at [facility] sleep lab in October 2024. He remains on auto CPAP 5-15cm H2O with full face mask that fits well. He loves his mask and pressures. He feels sleepy during the day if he sits quietly. He drives very little now. His sleep schedule is 10:30pm-7:30am. ESS is 9.    SH- no tobacco, 5 alc per week, 1 caffeine.    ROS- +post nasal drip, rest is negative.    DME [employer].    Current Medications:    Current Outpatient Medications:     Eliquis 5 MG tablet tablet, TAKE 1 TABLET TWICE DAILY, Disp: 180 tablet, Rfl: 3    EPINEPHrine (EpiPen 2-Nathan) 0.3 MG/0.3ML solution auto-injector injection, Inject 0.3 mL into the appropriate muscle as directed by prescriber 1 (One) Time As Needed (anaphlyxis .) for up to 4 doses. IF NEEDED FOR ALLERGIC REACTION. GO TO ER IMMEDIATELY AFTER ANY USE., Disp: 2 each, Rfl: 1    metoprolol succinate XL (TOPROL-XL) 25 MG 24 hr tablet, TAKE 1 TABLET EVERY DAY, Disp: 90 tablet, Rfl: 3    Misc Natural Products (Glucosamine Chond Cmp Advanced) tablet, Take 1 tablet by mouth Daily., Disp: , Rfl:     Probiotic Product (PROBIOTIC PO), Take 1 tablet by mouth Daily., Disp: , Rfl:     timolol (TIMOPTIC) 0.5 % ophthalmic solution, , Disp: , Rfl:    also entered in Sleep Questionnaire    Patient  has a past medical history of Arthritis, Atrial fibrillation, Cataract, Corneal abrasion, left (07/23/2015), COVID-19 (09/2023), Elevated PSA (07/2015), Glaucoma, Hemorrhoids, Hypertension, IGT (impaired glucose tolerance) (07/2015), and Prostate cancer (07/2019).    I have reviewed the Past Medical History, Past Surgical History, Social History and Family History.    Vital Signs /68   Pulse 62   Ht 172.7 cm (68\")   Wt 66.7 kg (147 lb)   SpO2 96%   BMI 22.35 " kg/m²  Body mass index is 22.35 kg/m².    General Alert and oriented. No acute distress noted   Pharynx/Throat Class I Mallampati airway, large tongue, no evidence of redundant lateral pharyngeal tissue. No oral lesions. No thrush. Moist mucous membranes.   Head Normocephalic. Symmetrical. Atraumatic.    Nose No septal deviation. No drainage   Chest Wall Normal shape. Symmetric expansion with respiration. No tenderness.   Neck Trachea midline, no thyromegaly or adenopathy    Lungs Clear to auscultation bilaterally. No wheezes. No rhonchi. No rales. Respirations regular, even and unlabored.   Heart Regular rhythm and normal rate. Normal S1 and S2. No murmur   Abdomen Soft, non-tender and non-distended. Normal bowel sounds. No masses.   Extremities Moves all extremities well. No edema   Psychiatric Normal mood and affect.     Testing:  Download     Study-Diagnostic data available to date is as below and was reviewed on current visit:  10/7/22: The patient tolerated the home sleep testing with monitoring time of 347 minutes. The data obtained make this a technically adequate study. The apnea hypopneas index(AHI) was 62.9 per sleep hour. The AHI during supine position was 63.6 per sleep hour. Mean heart rate of 51.5 BPM. Snoring was noted 44.3% of sleep time. Lowest oxygen saturation during the study was 66%. Saturation below 89% was noted for 32.4 mins.     Impression:  1. Obstructive sleep apnea, adult          Plan:  Change pressures on the CPAP to 7-15cm H2O  in view of slightly elevated AHI. Call if pressures feel excessive. He had overnight oximetry last year on CPAP therapy which showed no significant desaturations. He uses the device and benefits from its use.       I reviewed download report and original sleep study report with the patient.    Follow up with Dr. Irby in one year    Thank you for allowing me to participate in your patient's care.      CHAN Carrizales  [facility]  Phone:  [phone]      Part of this note may be an electronic transcription/translation of spoken language to printed text using the Dragon Dictation System.

## 2025-05-09 ENCOUNTER — OFFICE VISIT (OUTPATIENT)
Dept: INTERNAL MEDICINE | Facility: CLINIC | Age: OVER 89
End: 2025-05-09
Payer: MEDICARE

## 2025-05-09 VITALS
HEIGHT: 68 IN | WEIGHT: 147 LBS | HEART RATE: 67 BPM | DIASTOLIC BLOOD PRESSURE: 78 MMHG | OXYGEN SATURATION: 98 % | BODY MASS INDEX: 22.28 KG/M2 | SYSTOLIC BLOOD PRESSURE: 142 MMHG

## 2025-05-09 DIAGNOSIS — M54.50 MIDLINE LOW BACK PAIN WITHOUT SCIATICA, UNSPECIFIED CHRONICITY: Primary | ICD-10-CM

## 2025-05-09 PROBLEM — R19.4 CHANGE IN BOWEL HABITS: Status: RESOLVED | Noted: 2018-06-08 | Resolved: 2025-05-09

## 2025-05-09 PROBLEM — R14.0 ABDOMINAL BLOATING: Status: RESOLVED | Noted: 2020-06-15 | Resolved: 2025-05-09

## 2025-05-09 PROCEDURE — 1160F RVW MEDS BY RX/DR IN RCRD: CPT | Performed by: INTERNAL MEDICINE

## 2025-05-09 PROCEDURE — 1159F MED LIST DOCD IN RCRD: CPT | Performed by: INTERNAL MEDICINE

## 2025-05-09 PROCEDURE — 99213 OFFICE O/P EST LOW 20 MIN: CPT | Performed by: INTERNAL MEDICINE

## 2025-05-09 PROCEDURE — 1125F AMNT PAIN NOTED PAIN PRSNT: CPT | Performed by: INTERNAL MEDICINE

## 2025-05-09 NOTE — PROGRESS NOTES
Subjective     Steffen Leyva is a 89 y.o. male who presents with   Chief Complaint   Patient presents with    Back Pain       History of Present Illness     One week ago lifted a garbage can.  Back hurt hours later.  Hurts at rest.  Increased with movement.      Review of Systems   Respiratory: Negative.     Cardiovascular: Negative.        The following portions of the patient's history were reviewed and updated as appropriate: allergies, current medications and problem list.    Patient Active Problem List    Diagnosis Date Noted    Long COVID 05/22/2024    SHELTON (obstructive sleep apnea) 10/26/2022    Paroxysmal atrial fibrillation 08/05/2022    Malignant neoplasm of prostate 03/29/2021    Primary open angle glaucoma (POAG) 03/29/2021    Squamous cell carcinoma of skin 03/29/2021    Functional diarrhea 06/15/2020     Note Last Updated: 6/15/2020     Added automatically from request for surgery [id number]      Adenomatous polyp of ascending colon 06/15/2020     Note Last Updated: 6/15/2020     Added automatically from request for surgery [id number]      Abdominal bloating 06/15/2020     Note Last Updated: 6/15/2020     Added automatically from request for surgery [id number]      Celiac disease 06/15/2020     Note Last Updated: 6/22/2020     Added automatically from request for surgery [id number]      Change in bowel habits 06/08/2018     Note Last Updated: 6/8/2018     Added automatically from request for surgery [id number]      Arthritis 06/01/2018    Abnormal prostate specific antigen 06/01/2018    Borderline diabetes 06/01/2018    Chronic anterior uveitis 06/01/2018       Current Outpatient Medications on File Prior to Visit   Medication Sig Dispense Refill    Eliquis 5 MG tablet tablet TAKE 1 TABLET TWICE DAILY 180 tablet 3    EPINEPHrine (EpiPen 2-Nathan) 0.3 MG/0.3ML solution auto-injector injection Inject 0.3 mL into the appropriate muscle as directed by prescriber 1 (One) Time As Needed (anaphlyxis .) for up to 4 doses. IF  "NEEDED FOR ALLERGIC REACTION. GO TO ER IMMEDIATELY AFTER ANY USE. 2 each 1    metoprolol succinate XL (TOPROL-XL) 25 MG 24 hr tablet TAKE 1 TABLET EVERY DAY 90 tablet 3    Misc Natural Products (Glucosamine Chond Cmp Advanced) tablet Take 1 tablet by mouth Daily.      Probiotic Product (PROBIOTIC PO) Take 1 tablet by mouth Daily.      timolol (TIMOPTIC) 0.5 % ophthalmic solution        No current facility-administered medications on file prior to visit.       Objective     /78   Pulse 67   Ht 172.7 cm (67.99\")   Wt 66.7 kg (147 lb)   SpO2 98%   BMI 22.36 kg/m²     Physical Exam  Constitutional:       Appearance: He is well-developed.   HENT:      Head: Atraumatic.   Pulmonary:      Effort: Pulmonary effort is normal.   Musculoskeletal:      Lumbar back: Tenderness present. No bony tenderness.   Neurological:      Mental Status: He is alert and oriented to person, place, and time.     X-rays of the lumbar spine  performed today for following indication:   pain.  X-ray reveal nad.  There is no available x-ray for comparison.  X-ray sent to radiology for official interpretation and findings.        Assessment & Plan   Diagnoses and all orders for this visit:    1. Midline low back pain without sciatica, unspecified chronicity (Primary)  -     XR Spine Lumbar Complete 4+VW  -     Ambulatory Referral to Physical Therapy for Evaluation & Treatment        Discussion    Patient presents with acute low back pain giving more of a muscular description to his pain.  X-rays look unremarkable but will send for overread to the radiologist.  I discussed with the patient a trial of conservative management with:   tylenol, rest, and physical therapy.  Let me know if not feeling better over the next several weeks or if there is any change in symptoms.         Future Appointments   Date Time Provider Department Center   5/16/2025  3:00 PM Bony Zhang DO [facility]   5/28/2025 10:00 AM Iain Carey III, MD [facility] " [facility]   5/11/2026  1:00 PM Darren Irby MD [facility] None

## 2025-05-12 ENCOUNTER — RESULTS FOLLOW-UP (OUTPATIENT)
Dept: INTERNAL MEDICINE | Facility: CLINIC | Age: OVER 89
End: 2025-05-12
Payer: MEDICARE

## 2025-05-13 NOTE — TELEPHONE ENCOUNTER
Patient called back regarding x-ray results. He said that he would like to hold off on MRI and discuss with Dr. Zhang on upcoming appointment on 05/16/2025

## 2025-05-14 ENCOUNTER — TELEPHONE (OUTPATIENT)
Dept: INTERNAL MEDICINE | Facility: CLINIC | Age: OVER 89
End: 2025-05-14

## 2025-05-14 NOTE — TELEPHONE ENCOUNTER
Caller: FAMILIA ARRIOLA  ON  [address]  Relationship: Child    Best call back number: [phone]     What orders are you requesting (i.e. lab or imaging): MRI OF BACK     In what timeframe would the patient need to come in: ASAP    Where will you receive your lab/imaging services: [facility]    Additional notes: ONCE ORDER HAS BEEN ENTERED PLEASE CALL PATIENT TO LET HIM KNOW THAT HE CAN STOP BY [facility].

## 2025-05-16 ENCOUNTER — OFFICE VISIT (OUTPATIENT)
Dept: INTERNAL MEDICINE | Facility: CLINIC | Age: OVER 89
End: 2025-05-16
Payer: MEDICARE

## 2025-05-16 VITALS
SYSTOLIC BLOOD PRESSURE: 134 MMHG | TEMPERATURE: 97.8 F | HEART RATE: 69 BPM | WEIGHT: 146 LBS | BODY MASS INDEX: 22.13 KG/M2 | DIASTOLIC BLOOD PRESSURE: 80 MMHG | HEIGHT: 68 IN | OXYGEN SATURATION: 93 %

## 2025-05-16 DIAGNOSIS — S22.080A COMPRESSION FRACTURE OF T12 VERTEBRA, INITIAL ENCOUNTER: ICD-10-CM

## 2025-05-16 DIAGNOSIS — I10 PRIMARY HYPERTENSION: ICD-10-CM

## 2025-05-16 DIAGNOSIS — M81.0 AGE RELATED OSTEOPOROSIS, UNSPECIFIED PATHOLOGICAL FRACTURE PRESENCE: Primary | ICD-10-CM

## 2025-05-16 DIAGNOSIS — I48.0 PAROXYSMAL ATRIAL FIBRILLATION: ICD-10-CM

## 2025-05-16 DIAGNOSIS — M51.360 DEGENERATION OF INTERVERTEBRAL DISC OF LUMBAR REGION WITH DISCOGENIC BACK PAIN: ICD-10-CM

## 2025-05-16 DIAGNOSIS — R53.1 WEAKNESS GENERALIZED: ICD-10-CM

## 2025-05-16 DIAGNOSIS — R19.8 CHANGE IN BOWEL FUNCTION: ICD-10-CM

## 2025-05-16 NOTE — PROGRESS NOTES
"  [facility], [facility]  [facility]  [facility]  [address], [address]  [place] [address]  [phone]    Chief Complaint  6 mos check-up (Also discuss problem he is having)    SUBJECTIVE    History of Present Illness    Steffen Leyva is a 89 y.o. male who presents to the office today as an established patient that last saw me on 9/16/2024.  Patient here today with his wife and daughter who help provide history.    Patient states he is \"not good \"today.  States since April 2025 he has had \"stomach issues, digestion\".  States he is had no abdominal pain during this time but did vomit 1 time.  He states typically has a bowel movement every morning but over the last 2 weeks he has had worsening issues with what he describes as incontinence.  His daughter clarifies that he is not having incontinence but is eating prunes and taking docusate and MiraLAX over-the-counter and when he has an urge to go to the restroom to have a bowel movement he is not able to make it in time.  Patient agrees with this clarification.  He states that he can still feel that his bowel movement is about to occur.  Patient states \"I have a lot of warning but not a lot of time \".  He states that he chronically has issues with difficulty controlling his bladder.  He denies any numbness in the groin area.    Patient states on May 1, 2025 he developed terrible back pain.  The back pain is on both sides of the lower back but not in the middle.  It hurts all the time but worse if he bends backwards.  Also hurts worse if he moves around in bed in certain directions.  The pain can be 9 out of 10 in severity but decreases to a 3-4 out of 10 if he takes Tylenol 1000 mg.  He states he had a previous tramadol prescription that he is taken on 2 occasions\" it just kills the pain \".  He has already seen my partner Dr. Hernandez for this concern and has had a back x-ray which she would like to discuss.  He states his first " "physical therapy appointment is next week.    In general, he just feels that his quads are weak and he feels generally weak overall.  He does not feel like he is drinking as much water as he normally did or eating as much as he previously did.  He denies shortness of air or chest pain.    Paroxysmal atrial fibrillation/HTN: follows with [facility]. Anticoagulated with apixaban 5 mg twice daily. BP and HR control with metoprolol succinate 25 mg daily . Doesn't check BP at home. His HR \"has always felt good\".     Allergies   Allergen Reactions    Prednisolone Unknown - High Severity     High Eye pressure    Oxycodone-Acetaminophen Nausea And Vomiting        Outpatient Medications Marked as Taking for the 5/16/25 encounter (Office Visit) with Bony Zhang,    Medication Sig Dispense Refill    Eliquis 5 MG tablet tablet TAKE 1 TABLET TWICE DAILY 180 tablet 3    EPINEPHrine (EpiPen 2-Nathan) 0.3 MG/0.3ML solution auto-injector injection Inject 0.3 mL into the appropriate muscle as directed by prescriber 1 (One) Time As Needed (anaphlyxis .) for up to 4 doses. IF NEEDED FOR ALLERGIC REACTION. GO TO ER IMMEDIATELY AFTER ANY USE. 2 each 1    metoprolol succinate XL (TOPROL-XL) 25 MG 24 hr tablet TAKE 1 TABLET EVERY DAY 90 tablet 3    Probiotic Product (PROBIOTIC PO) Take 1 tablet by mouth Daily.      timolol (TIMOPTIC) 0.5 % ophthalmic solution           Past Medical History:   Diagnosis Date    Arthritis     Atrial fibrillation     Cataract     Corneal abrasion, left 07/23/2015    SEEN AT [facility] ER    COVID-19 09/2023    Elevated PSA 07/2015    Glaucoma     Hemorrhoids     Hypertension     IGT (impaired glucose tolerance) 07/2015    Prostate cancer 07/2019       OBJECTIVE    Vital Signs:   /80   Pulse 69   Temp 97.8 °F (36.6 °C) (Infrared)   Ht 172.7 cm (67.99\")   Wt 66.2 kg (146 lb)   SpO2 93%   BMI 22.21 kg/m²        Physical Exam  Vitals reviewed.   Constitutional:       General: He is not in acute " distress.     Appearance: He is normal weight. He is not ill-appearing.   Eyes:      General: No scleral icterus.  Cardiovascular:      Rate and Rhythm: Normal rate and regular rhythm.      Heart sounds: Normal heart sounds. No murmur heard.  Pulmonary:      Effort: Pulmonary effort is normal. No respiratory distress.      Breath sounds: Normal breath sounds. No wheezing.   Abdominal:      General: Bowel sounds are normal. There is no distension.      Palpations: Abdomen is soft.      Tenderness: There is no abdominal tenderness. There is no guarding.   Musculoskeletal:      Right lower leg: No edema.      Left lower leg: No edema.   Skin:     Coloration: Skin is not jaundiced.   Neurological:      Mental Status: He is alert.   Psychiatric:         Mood and Affect: Mood normal.         Behavior: Behavior normal.         Thought Content: Thought content normal.                           XR Spine Lumbar Complete 4+VW [[id number]] (Order [id number])  Order  Status: Final result     Study Notes     Nehal Lane MA/LMR on 5/9/2025  1:14 PM EDT   Midline low back pain without sciatica, unspecified chronicity     Appointment Information    PACS Images     Radiology Images  Study Result    Narrative & Impression   5 VIEW LUMBAR SPINE     HISTORY: Low back pain.     FINDINGS: There is moderate diffuse osteoporosis showing progression  since plain films of the lumbar spine dating back to 7/1/2014. This is  associated with very slight compression deformity of T12 that is new  since 2014 but is of uncertain age. There is mild to moderate disc space  narrowing and spurring at L1-2 and there is also some mild posterior  spurring of the posterior facets throughout the lumbar spine.     This report was finalized on 5/11/2025 11:10 AM by Dr. Allen Wilburn M.D on Workstation: [id number]     ASSESSMENT & PLAN     Diagnoses and all orders for this visit:    1. Age related osteoporosis, unspecified pathological fracture presence  "(Primary)  -findings of osteoporosis seen on recent lumbar spine xray. Check Calcium and vitamin D. Order DEXA scan to see where we are starting and plan to discuss treatment options once resulted.    2. Change in bowel function  -duration 1 month of decrease in frequency of bowel movements but seemingly still having a bowel movement every 1-2 days  -has bee nusing prunes and taking docusate and MiraLAX over-the-counter  -without associated abdominal pain but with 1 episode of vomiting   -after further clarification he states he is not having fecal incontinence but just urgency to go to the restroom and cannot make it in time all the time   -advised pt that I would like him to STOP miralax and prunes . Having a bowel motion every 1-2 days is acceptable. Abdominal exam benign today. Will obtain KUB to evaluate. He can take docusate over the counter if needed but he does not need to take miralax  daily.   -     XR Abdomen KUB    3. Weakness generalized  -In general, he just feels that his quads are weak and he feels generally weak overall.  He does not feel like he is drinking as much water as he normally did or eating as much as he previously did.  He denies shortness of air or chest pain.  -Obtain lab eval as below. Further plan based upon results. He is already being referred to PT for his low back (see below).   -     Comprehensive Metabolic Panel  -     Vitamin D,25-Hydroxy  -     CBC & Differential  -     Urinalysis With Microscopic - Urine, Clean Catch  -     Urine Culture - Urine, Urine, Clean Catch  -     TSH Rfx On Abnormal To Free T4    4. Paroxysmal atrial fibrillation  5. Primary hypertension  -follows with [facility]. Anticoagulated with apixaban 5 mg twice daily. BP and HR control with metoprolol succinate 25 mg daily . Doesn't check BP at home. His HR \"has always felt good\".   -/80, HR 69. Appropriate. Euvolemic on exam. Continue current regimen.    6. Lumbar DDD   7. T12 compression " "fracture  -Patient states on May 1, 2025 he developed terrible back pain.  The back pain is on both sides of the lower back but not in the middle.  It hurts all the time but worse if he bends backwards.  Also hurts worse if he moves around in bed in certain directions.  The pain can be 9 out of 10 in severity but decreases to a 3-4 out of 10 if he takes Tylenol 1000 mg.  He states he had a previous tramadol prescription that he is taken on 2 occasions\" it just kills the pain \".  He has already seen my partner Dr. Hernandez for this concern and has had a back x-ray which she would like to discuss.  He states his first physical therapy appointment is next week.  -lumbar spine from 5/9/25   \" There is moderate diffuse osteoporosis showing progression  since plain films of the lumbar spine dating back to 7/1/2014. This is  associated with very slight compression deformity of T12 that is new  since 2014 but is of uncertain age. There is mild to moderate disc space  narrowing and spurring at L1-2 and there is also some mild posterior  spurring of the posterior facets throughout the lumbar spine.\"  -at this point he is already set up for PT  -Advised him his thoracic compression fracture could be new or old, unable to discern from xray  -I recommend MRI of his lumbar spine to further eval and he is agreeable.   -discussed potential referral to pain management, he is not committal on that at this time   -discussed to avoid old opiates like tramadol, especially in the setting of his age, risk of falls and changing bowels as discussed above   -I recommend continuing with tylenol 1000 mg up to three times daily as needed at this time since he is getting adequate pain reflief when he does take it        Follow Up  Return in about 4 weeks (around 6/13/2025) for Recheck.    Patient/family had no further questions at this time and verbalized understanding of the plan discussed today.   "

## 2025-05-18 LAB
25(OH)D3+25(OH)D2 SERPL-MCNC: 24.4 NG/ML (ref 30–100)
ALBUMIN SERPL-MCNC: 4.4 G/DL (ref 3.5–5.2)
ALBUMIN/GLOB SERPL: 1.6 G/DL
ALP SERPL-CCNC: 114 U/L (ref 39–117)
ALT SERPL-CCNC: 12 U/L (ref 1–41)
APPEARANCE UR: CLEAR
AST SERPL-CCNC: 20 U/L (ref 1–40)
BACTERIA #/AREA URNS HPF: NORMAL /HPF
BACTERIA UR CULT: NORMAL
BACTERIA UR CULT: NORMAL
BASOPHILS # BLD AUTO: 0.05 10*3/MM3 (ref 0–0.2)
BASOPHILS NFR BLD AUTO: 1 % (ref 0–1.5)
BILIRUB SERPL-MCNC: 0.3 MG/DL (ref 0–1.2)
BILIRUB UR QL STRIP: NEGATIVE
BUN SERPL-MCNC: 20 MG/DL (ref 8–23)
BUN/CREAT SERPL: 20.4 (ref 7–25)
CALCIUM SERPL-MCNC: 9.2 MG/DL (ref 8.2–9.6)
CASTS URNS MICRO: NORMAL
CHLORIDE SERPL-SCNC: 91 MMOL/L (ref 98–107)
CO2 SERPL-SCNC: 25.1 MMOL/L (ref 22–29)
COLOR UR: YELLOW
CREAT SERPL-MCNC: 0.98 MG/DL (ref 0.76–1.27)
EGFRCR SERPLBLD CKD-EPI 2021: 73.3 ML/MIN/1.73
EOSINOPHIL # BLD AUTO: 0.26 10*3/MM3 (ref 0–0.4)
EOSINOPHIL NFR BLD AUTO: 5.1 % (ref 0.3–6.2)
EPI CELLS #/AREA URNS HPF: NORMAL /HPF
ERYTHROCYTE [DISTWIDTH] IN BLOOD BY AUTOMATED COUNT: 12.4 % (ref 12.3–15.4)
GLOBULIN SER CALC-MCNC: 2.8 GM/DL
GLUCOSE SERPL-MCNC: 122 MG/DL (ref 65–99)
GLUCOSE UR QL STRIP: NEGATIVE
HCT VFR BLD AUTO: 39.6 % (ref 37.5–51)
HGB BLD-MCNC: 12.9 G/DL (ref 13–17.7)
HGB UR QL STRIP: ABNORMAL
IMM GRANULOCYTES # BLD AUTO: 0.01 10*3/MM3 (ref 0–0.05)
IMM GRANULOCYTES NFR BLD AUTO: 0.2 % (ref 0–0.5)
KETONES UR QL STRIP: ABNORMAL
LEUKOCYTE ESTERASE UR QL STRIP: NEGATIVE
LYMPHOCYTES # BLD AUTO: 1.41 10*3/MM3 (ref 0.7–3.1)
LYMPHOCYTES NFR BLD AUTO: 27.9 % (ref 19.6–45.3)
MCH RBC QN AUTO: 32 PG (ref 26.6–33)
MCHC RBC AUTO-ENTMCNC: 32.6 G/DL (ref 31.5–35.7)
MCV RBC AUTO: 98.3 FL (ref 79–97)
MONOCYTES # BLD AUTO: 0.7 10*3/MM3 (ref 0.1–0.9)
MONOCYTES NFR BLD AUTO: 13.9 % (ref 5–12)
NEUTROPHILS # BLD AUTO: 2.62 10*3/MM3 (ref 1.7–7)
NEUTROPHILS NFR BLD AUTO: 51.9 % (ref 42.7–76)
NITRITE UR QL STRIP: NEGATIVE
NRBC BLD AUTO-RTO: 0 /100 WBC (ref 0–0.2)
PH UR STRIP: 5.5 [PH] (ref 5–8)
PLATELET # BLD AUTO: 378 10*3/MM3 (ref 140–450)
POTASSIUM SERPL-SCNC: 4.9 MMOL/L (ref 3.5–5.2)
PROT SERPL-MCNC: 7.2 G/DL (ref 6–8.5)
PROT UR QL STRIP: ABNORMAL
RBC # BLD AUTO: 4.03 10*6/MM3 (ref 4.14–5.8)
RBC #/AREA URNS HPF: NORMAL /HPF
SODIUM SERPL-SCNC: 127 MMOL/L (ref 136–145)
SP GR UR STRIP: >1.03 (ref 1–1.03)
TSH SERPL DL<=0.005 MIU/L-ACNC: 1.52 UIU/ML (ref 0.27–4.2)
UROBILINOGEN UR STRIP-MCNC: ABNORMAL MG/DL
WBC # BLD AUTO: 5.05 10*3/MM3 (ref 3.4–10.8)
WBC #/AREA URNS HPF: NORMAL /HPF

## 2025-05-19 ENCOUNTER — TELEPHONE (OUTPATIENT)
Dept: INTERNAL MEDICINE | Facility: CLINIC | Age: OVER 89
End: 2025-05-19
Payer: MEDICARE

## 2025-05-19 NOTE — TELEPHONE ENCOUNTER
Caller: EMI HOWARD    Relationship: Child    Best call back number: [phone]     What orders are you requesting (i.e. lab or imaging): MRI OF BACK AND BONE DENSITY    In what timeframe would the patient need to come in: ASAP    Where will you receive your lab/imaging services: [facility]    Additional notes: PLEASE ENTER ORDERS AND INFORM PATIENTS DAUGHTER SO THEY MAY SCHEDULE ASAP.

## 2025-05-19 NOTE — TELEPHONE ENCOUNTER
Please contact the caller and tell them I will place the MRI and bone density but the more important thing is to get the repeat labs that I sent a message on earlier.

## 2025-05-20 ENCOUNTER — HOSPITAL ENCOUNTER (INPATIENT)
Facility: HOSPITAL | Age: OVER 89
LOS: 2 days | Discharge: HOME | End: 2025-05-22
Attending: HOSPITALIST | Admitting: HOSPITALIST
Payer: MEDICARE

## 2025-05-20 ENCOUNTER — TELEPHONE (OUTPATIENT)
Dept: INTERNAL MEDICINE | Facility: CLINIC | Age: OVER 89
End: 2025-05-20
Payer: MEDICARE

## 2025-05-20 ENCOUNTER — LAB (OUTPATIENT)
Facility: HOSPITAL | Age: OVER 89
End: 2025-05-20
Payer: MEDICARE

## 2025-05-20 DIAGNOSIS — M51.360 DEGENERATION OF INTERVERTEBRAL DISC OF LUMBAR REGION WITH DISCOGENIC BACK PAIN: ICD-10-CM

## 2025-05-20 DIAGNOSIS — S22.080A COMPRESSION FRACTURE OF T12 VERTEBRA, INITIAL ENCOUNTER: ICD-10-CM

## 2025-05-20 PROBLEM — E87.1 HYPONATREMIA: Status: ACTIVE | Noted: 2025-05-20

## 2025-05-20 LAB
ALBUMIN SERPL-MCNC: 4.1 G/DL (ref 3.5–5.2)
ALBUMIN/GLOB SERPL: 1.5 G/DL
ALP SERPL-CCNC: 101 U/L (ref 39–117)
ALT SERPL W P-5'-P-CCNC: 8 U/L (ref 1–41)
ANION GAP SERPL CALCULATED.3IONS-SCNC: 9 MMOL/L (ref 5–15)
AST SERPL-CCNC: 16 U/L (ref 1–40)
BACTERIA UR QL AUTO: ABNORMAL /HPF
BASOPHILS # BLD AUTO: 0.05 10*3/MM3 (ref 0–0.2)
BASOPHILS NFR BLD AUTO: 1.1 % (ref 0–1.5)
BILIRUB SERPL-MCNC: 0.2 MG/DL (ref 0–1.2)
BILIRUB UR QL STRIP: NEGATIVE
BUN SERPL-MCNC: 18 MG/DL (ref 8–23)
BUN/CREAT SERPL: 23.4 (ref 7–25)
CALCIUM SPEC-SCNC: 8.7 MG/DL (ref 8.2–9.6)
CHLORIDE SERPL-SCNC: 93 MMOL/L (ref 98–107)
CLARITY UR: CLEAR
CO2 SERPL-SCNC: 23 MMOL/L (ref 22–29)
COLOR UR: YELLOW
CREAT SERPL-MCNC: 0.77 MG/DL (ref 0.76–1.27)
DEPRECATED RDW RBC AUTO: 44.1 FL (ref 37–54)
EGFRCR SERPLBLD CKD-EPI 2021: 85 ML/MIN/1.73
EOSINOPHIL # BLD AUTO: 0.27 10*3/MM3 (ref 0–0.4)
EOSINOPHIL NFR BLD AUTO: 6 % (ref 0.3–6.2)
ERYTHROCYTE [DISTWIDTH] IN BLOOD BY AUTOMATED COUNT: 12.5 % (ref 12.3–15.4)
GLOBULIN UR ELPH-MCNC: 2.8 GM/DL
GLUCOSE SERPL-MCNC: 97 MG/DL (ref 65–99)
GLUCOSE UR STRIP-MCNC: NEGATIVE MG/DL
HCT VFR BLD AUTO: 36.4 % (ref 37.5–51)
HGB BLD-MCNC: 12 G/DL (ref 13–17.7)
HGB UR QL STRIP.AUTO: ABNORMAL
HYALINE CASTS UR QL AUTO: ABNORMAL /LPF
IMM GRANULOCYTES # BLD AUTO: 0.03 10*3/MM3 (ref 0–0.05)
IMM GRANULOCYTES NFR BLD AUTO: 0.7 % (ref 0–0.5)
KETONES UR QL STRIP: NEGATIVE
LEUKOCYTE ESTERASE UR QL STRIP.AUTO: NEGATIVE
LYMPHOCYTES # BLD AUTO: 1.43 10*3/MM3 (ref 0.7–3.1)
LYMPHOCYTES NFR BLD AUTO: 31.6 % (ref 19.6–45.3)
MCH RBC QN AUTO: 31.8 PG (ref 26.6–33)
MCHC RBC AUTO-ENTMCNC: 33 G/DL (ref 31.5–35.7)
MCV RBC AUTO: 96.6 FL (ref 79–97)
MONOCYTES # BLD AUTO: 0.55 10*3/MM3 (ref 0.1–0.9)
MONOCYTES NFR BLD AUTO: 12.1 % (ref 5–12)
NEUTROPHILS NFR BLD AUTO: 2.2 10*3/MM3 (ref 1.7–7)
NEUTROPHILS NFR BLD AUTO: 48.5 % (ref 42.7–76)
NITRITE UR QL STRIP: NEGATIVE
NRBC BLD AUTO-RTO: 0 /100 WBC (ref 0–0.2)
OSMOLALITY UR: 319 MOSM/KG
PH UR STRIP.AUTO: 7 [PH] (ref 5–8)
PLATELET # BLD AUTO: 316 10*3/MM3 (ref 140–450)
PMV BLD AUTO: 8.7 FL (ref 6–12)
POTASSIUM SERPL-SCNC: 4.6 MMOL/L (ref 3.5–5.2)
PROT SERPL-MCNC: 6.9 G/DL (ref 6–8.5)
PROT UR QL STRIP: NEGATIVE
RBC # BLD AUTO: 3.77 10*6/MM3 (ref 4.14–5.8)
RBC # UR STRIP: ABNORMAL /HPF
REF LAB TEST METHOD: ABNORMAL
SODIUM SERPL-SCNC: 125 MMOL/L (ref 136–145)
SODIUM UR-SCNC: 65 MMOL/L
SP GR UR STRIP: 1.01 (ref 1–1.03)
SQUAMOUS #/AREA URNS HPF: ABNORMAL /HPF
TSH SERPL DL<=0.05 MIU/L-ACNC: 2.86 UIU/ML (ref 0.27–4.2)
UROBILINOGEN UR QL STRIP: ABNORMAL
WBC # UR STRIP: ABNORMAL /HPF
WBC NRBC COR # BLD AUTO: 4.53 10*3/MM3 (ref 3.4–10.8)

## 2025-05-20 PROCEDURE — 83540 ASSAY OF IRON: CPT | Performed by: STUDENT IN AN ORGANIZED HEALTH CARE EDUCATION/TRAINING PROGRAM

## 2025-05-20 PROCEDURE — 82728 ASSAY OF FERRITIN: CPT | Performed by: STUDENT IN AN ORGANIZED HEALTH CARE EDUCATION/TRAINING PROGRAM

## 2025-05-20 PROCEDURE — 84443 ASSAY THYROID STIM HORMONE: CPT | Performed by: HOSPITALIST

## 2025-05-20 PROCEDURE — 84466 ASSAY OF TRANSFERRIN: CPT | Performed by: STUDENT IN AN ORGANIZED HEALTH CARE EDUCATION/TRAINING PROGRAM

## 2025-05-20 PROCEDURE — 25810000003 SODIUM CHLORIDE 0.9 % SOLUTION: Performed by: HOSPITALIST

## 2025-05-20 PROCEDURE — 80053 COMPREHEN METABOLIC PANEL: CPT | Performed by: STUDENT IN AN ORGANIZED HEALTH CARE EDUCATION/TRAINING PROGRAM

## 2025-05-20 PROCEDURE — 85025 COMPLETE CBC W/AUTO DIFF WBC: CPT | Performed by: HOSPITALIST

## 2025-05-20 PROCEDURE — 83935 ASSAY OF URINE OSMOLALITY: CPT | Performed by: HOSPITALIST

## 2025-05-20 PROCEDURE — 84300 ASSAY OF URINE SODIUM: CPT | Performed by: HOSPITALIST

## 2025-05-20 PROCEDURE — 81001 URINALYSIS AUTO W/SCOPE: CPT | Performed by: HOSPITALIST

## 2025-05-20 PROCEDURE — 82746 ASSAY OF FOLIC ACID SERUM: CPT | Performed by: STUDENT IN AN ORGANIZED HEALTH CARE EDUCATION/TRAINING PROGRAM

## 2025-05-20 RX ORDER — NITROGLYCERIN 0.4 MG/1
0.4 TABLET SUBLINGUAL
Status: DISCONTINUED | OUTPATIENT
Start: 2025-05-20 | End: 2025-05-22 | Stop reason: HOSPADM

## 2025-05-20 RX ORDER — ONDANSETRON 4 MG/1
4 TABLET, ORALLY DISINTEGRATING ORAL EVERY 6 HOURS PRN
Status: DISCONTINUED | OUTPATIENT
Start: 2025-05-20 | End: 2025-05-22 | Stop reason: HOSPADM

## 2025-05-20 RX ORDER — SODIUM CHLORIDE 9 MG/ML
100 INJECTION, SOLUTION INTRAVENOUS CONTINUOUS
Status: DISCONTINUED | OUTPATIENT
Start: 2025-05-20 | End: 2025-05-21

## 2025-05-20 RX ORDER — AMOXICILLIN 250 MG
2 CAPSULE ORAL 2 TIMES DAILY PRN
Status: DISCONTINUED | OUTPATIENT
Start: 2025-05-20 | End: 2025-05-22 | Stop reason: HOSPADM

## 2025-05-20 RX ORDER — SODIUM CHLORIDE 9 MG/ML
40 INJECTION, SOLUTION INTRAVENOUS AS NEEDED
Status: DISCONTINUED | OUTPATIENT
Start: 2025-05-20 | End: 2025-05-22 | Stop reason: HOSPADM

## 2025-05-20 RX ORDER — SODIUM CHLORIDE 0.9 % (FLUSH) 0.9 %
10 SYRINGE (ML) INJECTION EVERY 12 HOURS SCHEDULED
Status: DISCONTINUED | OUTPATIENT
Start: 2025-05-20 | End: 2025-05-22 | Stop reason: HOSPADM

## 2025-05-20 RX ORDER — ONDANSETRON 2 MG/ML
4 INJECTION INTRAMUSCULAR; INTRAVENOUS EVERY 6 HOURS PRN
Status: DISCONTINUED | OUTPATIENT
Start: 2025-05-20 | End: 2025-05-22 | Stop reason: HOSPADM

## 2025-05-20 RX ORDER — L.ACID,PARA/B.BIFIDUM/S.THERM 8B CELL
1 CAPSULE ORAL DAILY
Status: DISCONTINUED | OUTPATIENT
Start: 2025-05-21 | End: 2025-05-22 | Stop reason: HOSPADM

## 2025-05-20 RX ORDER — BISACODYL 10 MG
10 SUPPOSITORY, RECTAL RECTAL DAILY PRN
Status: DISCONTINUED | OUTPATIENT
Start: 2025-05-20 | End: 2025-05-22 | Stop reason: HOSPADM

## 2025-05-20 RX ORDER — ACETAMINOPHEN 650 MG/1
650 SUPPOSITORY RECTAL EVERY 4 HOURS PRN
Status: DISCONTINUED | OUTPATIENT
Start: 2025-05-20 | End: 2025-05-22 | Stop reason: HOSPADM

## 2025-05-20 RX ORDER — METOPROLOL SUCCINATE 25 MG/1
25 TABLET, EXTENDED RELEASE ORAL DAILY
Status: DISCONTINUED | OUTPATIENT
Start: 2025-05-21 | End: 2025-05-22 | Stop reason: HOSPADM

## 2025-05-20 RX ORDER — TIMOLOL MALEATE 5 MG/ML
1 SOLUTION/ DROPS OPHTHALMIC DAILY
Status: DISCONTINUED | OUTPATIENT
Start: 2025-05-21 | End: 2025-05-22 | Stop reason: HOSPADM

## 2025-05-20 RX ORDER — FAMOTIDINE 20 MG/1
40 TABLET, FILM COATED ORAL DAILY
Status: DISCONTINUED | OUTPATIENT
Start: 2025-05-21 | End: 2025-05-22 | Stop reason: HOSPADM

## 2025-05-20 RX ORDER — BISACODYL 5 MG/1
5 TABLET, DELAYED RELEASE ORAL DAILY PRN
Status: DISCONTINUED | OUTPATIENT
Start: 2025-05-20 | End: 2025-05-22 | Stop reason: HOSPADM

## 2025-05-20 RX ORDER — ACETAMINOPHEN 160 MG/5ML
650 SOLUTION ORAL EVERY 4 HOURS PRN
Status: DISCONTINUED | OUTPATIENT
Start: 2025-05-20 | End: 2025-05-22 | Stop reason: HOSPADM

## 2025-05-20 RX ORDER — POLYETHYLENE GLYCOL 3350 17 G/17G
17 POWDER, FOR SOLUTION ORAL DAILY PRN
Status: DISCONTINUED | OUTPATIENT
Start: 2025-05-20 | End: 2025-05-22 | Stop reason: HOSPADM

## 2025-05-20 RX ORDER — SODIUM CHLORIDE 0.9 % (FLUSH) 0.9 %
10 SYRINGE (ML) INJECTION AS NEEDED
Status: DISCONTINUED | OUTPATIENT
Start: 2025-05-20 | End: 2025-05-22 | Stop reason: HOSPADM

## 2025-05-20 RX ORDER — DIPHENHYDRAMINE HCL 2 %
1 GEL (ML) TOPICAL DAILY
Status: DISCONTINUED | OUTPATIENT
Start: 2025-05-21 | End: 2025-05-20

## 2025-05-20 RX ORDER — ACETAMINOPHEN 325 MG/1
650 TABLET ORAL EVERY 4 HOURS PRN
Status: DISCONTINUED | OUTPATIENT
Start: 2025-05-20 | End: 2025-05-22 | Stop reason: HOSPADM

## 2025-05-20 RX ADMIN — Medication 10 ML: at 22:27

## 2025-05-20 RX ADMIN — SODIUM CHLORIDE 100 ML/HR: 9 INJECTION, SOLUTION INTRAVENOUS at 22:26

## 2025-05-20 RX ADMIN — ACETAMINOPHEN 650 MG: 325 TABLET, FILM COATED ORAL at 23:45

## 2025-05-20 NOTE — NURSING NOTE
Pt arrived to the floor in rm 449 at 1940, pt states he is here d/t abnormal labs. A notified of arrival, awaiting orders at this time. Wife and daughter at bedside.

## 2025-05-20 NOTE — TELEPHONE ENCOUNTER
"The orders for MRI and dexa scan are now in. These are \"non stat \" tests and will likely take several weeks to get completed.      I just spoke with the hospitalist who has accepted him for admission.        "

## 2025-05-20 NOTE — TELEPHONE ENCOUNTER
Patient called and stated that he saw you last week and you said you were going to order a MRI of back and bone density scan. Order are not in. Patient daughter was wondering if you can please put in orders.    Daughter also aware of low sodium level. Patient is going to call patient to advise Dr. Zhang would like him to be admitted to hospital. She stated that he will probably fight on being admitted but she is going to call him.

## 2025-05-21 ENCOUNTER — APPOINTMENT (OUTPATIENT)
Dept: MRI IMAGING | Facility: HOSPITAL | Age: OVER 89
End: 2025-05-21
Payer: MEDICARE

## 2025-05-21 PROBLEM — M54.9 BACK PAIN: Status: ACTIVE | Noted: 2025-05-21

## 2025-05-21 PROBLEM — E44.0 MODERATE PROTEIN-CALORIE MALNUTRITION: Status: ACTIVE | Noted: 2025-05-21

## 2025-05-21 LAB
ANION GAP SERPL CALCULATED.3IONS-SCNC: 9 MMOL/L (ref 5–15)
BASOPHILS # BLD AUTO: 0.04 10*3/MM3 (ref 0–0.2)
BASOPHILS NFR BLD AUTO: 1 % (ref 0–1.5)
BUN SERPL-MCNC: 12 MG/DL (ref 8–23)
BUN/CREAT SERPL: 17.1 (ref 7–25)
CALCIUM SPEC-SCNC: 8.7 MG/DL (ref 8.2–9.6)
CHLORIDE SERPL-SCNC: 97 MMOL/L (ref 98–107)
CO2 SERPL-SCNC: 24 MMOL/L (ref 22–29)
CORTIS SERPL-MCNC: 10.9 MCG/DL
CREAT SERPL-MCNC: 0.7 MG/DL (ref 0.76–1.27)
DEPRECATED RDW RBC AUTO: 44.5 FL (ref 37–54)
EGFRCR SERPLBLD CKD-EPI 2021: 87.5 ML/MIN/1.73
EOSINOPHIL # BLD AUTO: 0.27 10*3/MM3 (ref 0–0.4)
EOSINOPHIL NFR BLD AUTO: 6.6 % (ref 0.3–6.2)
ERYTHROCYTE [DISTWIDTH] IN BLOOD BY AUTOMATED COUNT: 12.5 % (ref 12.3–15.4)
FOLATE SERPL-MCNC: 19.2 NG/ML (ref 4.78–24.2)
GLUCOSE SERPL-MCNC: 89 MG/DL (ref 65–99)
HCT VFR BLD AUTO: 36.3 % (ref 37.5–51)
HGB BLD-MCNC: 11.9 G/DL (ref 13–17.7)
IMM GRANULOCYTES # BLD AUTO: 0.01 10*3/MM3 (ref 0–0.05)
IMM GRANULOCYTES NFR BLD AUTO: 0.2 % (ref 0–0.5)
IRON 24H UR-MRATE: 62 MCG/DL (ref 59–158)
IRON SATN MFR SERPL: 21 % (ref 20–50)
LYMPHOCYTES # BLD AUTO: 1.3 10*3/MM3 (ref 0.7–3.1)
LYMPHOCYTES NFR BLD AUTO: 31.7 % (ref 19.6–45.3)
MAGNESIUM SERPL-MCNC: 2.1 MG/DL (ref 1.6–2.4)
MCH RBC QN AUTO: 31.9 PG (ref 26.6–33)
MCHC RBC AUTO-ENTMCNC: 32.8 G/DL (ref 31.5–35.7)
MCV RBC AUTO: 97.3 FL (ref 79–97)
MONOCYTES # BLD AUTO: 0.58 10*3/MM3 (ref 0.1–0.9)
MONOCYTES NFR BLD AUTO: 14.1 % (ref 5–12)
NEUTROPHILS NFR BLD AUTO: 1.9 10*3/MM3 (ref 1.7–7)
NEUTROPHILS NFR BLD AUTO: 46.4 % (ref 42.7–76)
NRBC BLD AUTO-RTO: 0 /100 WBC (ref 0–0.2)
PHOSPHATE SERPL-MCNC: 3.1 MG/DL (ref 2.5–4.5)
PLATELET # BLD AUTO: 286 10*3/MM3 (ref 140–450)
PMV BLD AUTO: 8.7 FL (ref 6–12)
POTASSIUM SERPL-SCNC: 4.1 MMOL/L (ref 3.5–5.2)
RBC # BLD AUTO: 3.73 10*6/MM3 (ref 4.14–5.8)
RETICS # AUTO: 0.05 10*6/MM3 (ref 0.02–0.13)
RETICS/RBC NFR AUTO: 1.31 % (ref 0.7–1.9)
SODIUM SERPL-SCNC: 130 MMOL/L (ref 136–145)
TIBC SERPL-MCNC: 299 MCG/DL (ref 298–536)
TRANSFERRIN SERPL-MCNC: 201 MG/DL (ref 200–360)
URATE SERPL-MCNC: 3 MG/DL (ref 3.4–7)
VIT B12 BLD-MCNC: 795 PG/ML (ref 211–946)
WBC NRBC COR # BLD AUTO: 4.1 10*3/MM3 (ref 3.4–10.8)

## 2025-05-21 PROCEDURE — 84100 ASSAY OF PHOSPHORUS: CPT | Performed by: HOSPITALIST

## 2025-05-21 PROCEDURE — 82607 VITAMIN B-12: CPT | Performed by: HOSPITALIST

## 2025-05-21 PROCEDURE — 83735 ASSAY OF MAGNESIUM: CPT | Performed by: HOSPITALIST

## 2025-05-21 PROCEDURE — 85025 COMPLETE CBC W/AUTO DIFF WBC: CPT | Performed by: HOSPITALIST

## 2025-05-21 PROCEDURE — 85045 AUTOMATED RETICULOCYTE COUNT: CPT | Performed by: HOSPITALIST

## 2025-05-21 PROCEDURE — 84550 ASSAY OF BLOOD/URIC ACID: CPT

## 2025-05-21 PROCEDURE — 83540 ASSAY OF IRON: CPT | Performed by: HOSPITALIST

## 2025-05-21 PROCEDURE — 97165 OT EVAL LOW COMPLEX 30 MIN: CPT

## 2025-05-21 PROCEDURE — 72148 MRI LUMBAR SPINE W/O DYE: CPT

## 2025-05-21 PROCEDURE — 82746 ASSAY OF FOLIC ACID SERUM: CPT | Performed by: HOSPITALIST

## 2025-05-21 PROCEDURE — 82533 TOTAL CORTISOL: CPT | Performed by: HOSPITALIST

## 2025-05-21 PROCEDURE — 72146 MRI CHEST SPINE W/O DYE: CPT

## 2025-05-21 PROCEDURE — 80048 BASIC METABOLIC PNL TOTAL CA: CPT | Performed by: HOSPITALIST

## 2025-05-21 PROCEDURE — 97530 THERAPEUTIC ACTIVITIES: CPT

## 2025-05-21 PROCEDURE — 84466 ASSAY OF TRANSFERRIN: CPT | Performed by: HOSPITALIST

## 2025-05-21 PROCEDURE — 82652 VIT D 1 25-DIHYDROXY: CPT | Performed by: HOSPITALIST

## 2025-05-21 PROCEDURE — 25810000003 SODIUM CHLORIDE 0.9 % SOLUTION: Performed by: HOSPITALIST

## 2025-05-21 RX ADMIN — APIXABAN 5 MG: 5 TABLET, FILM COATED ORAL at 20:17

## 2025-05-21 RX ADMIN — SODIUM CHLORIDE 100 ML/HR: 9 INJECTION, SOLUTION INTRAVENOUS at 08:12

## 2025-05-21 RX ADMIN — Medication 1 CAPSULE: at 08:13

## 2025-05-21 RX ADMIN — TIMOLOL MALEATE 1 DROP: 5 SOLUTION OPHTHALMIC at 08:12

## 2025-05-21 RX ADMIN — APIXABAN 5 MG: 5 TABLET, FILM COATED ORAL at 08:13

## 2025-05-21 RX ADMIN — METOPROLOL SUCCINATE 25 MG: 25 TABLET, EXTENDED RELEASE ORAL at 08:12

## 2025-05-21 RX ADMIN — ACETAMINOPHEN 650 MG: 325 TABLET, FILM COATED ORAL at 13:29

## 2025-05-21 RX ADMIN — FAMOTIDINE 40 MG: 20 TABLET, FILM COATED ORAL at 08:13

## 2025-05-21 RX ADMIN — ACETAMINOPHEN 650 MG: 325 TABLET, FILM COATED ORAL at 20:17

## 2025-05-21 RX ADMIN — Medication 10 ML: at 08:14

## 2025-05-21 RX ADMIN — Medication 10 ML: at 20:17

## 2025-05-21 NOTE — CONSULTS
[facility] Consult Note      Patient Name: Steffen Leyva  : 1935  MRN: [id number]  Primary Care Physician:  Bony Zhang DO  Referring Physician: Joshua Donahue MD  Date of admission: 2025    Subjective     Reason for Consult: Acute on chronic hyponatremia    HPI:   Steffen Leyva is a 89 y.o. male with chronic hyponatremia (BL Na low to mid 130s), normal renal function at baseline, hx prostate cancer, PAF on AC, HTN, and alpha gal syndrome, who presented to the hospital yesterday under the direction of PCP for weakness and hyponatremia (outpatient labs showed Na 127->126 on  & ). Been having mod to severe low back pain after recent back injury (, XR: slight compression deformity of T12); very poor oral solute intake since last FRI, able to drink some protein shake and lots of water on MON; had 1 episode of V/D over the weekend. Not on thiazide or SSRI. Na 126 upon arrival, -170s systolic. Started on IV NS @100ml/hr overnight, Na today up to 130. Renal fcn normal. Ate all lunch, no more N/V/D. Feels less lethargic today.   Urine Na was 65 and urine osm 319.  While Na was normal 136 in May 2023 has had mild hyponatremia in past ie 132 back in .      Review of Systems:   14 point review of systems is otherwise negative except for mentioned above on HPI    Personal History     Past Medical History:   Diagnosis Date    Arthritis     Atrial fibrillation     Cataract     Corneal abrasion, left 2015    SEEN AT [facility] ER    COVID-19 2023    Elevated PSA 2015    Glaucoma     Hemorrhoids     Hypertension     IGT (impaired glucose tolerance) 2015    Prostate cancer 2019       Past Surgical History:   Procedure Laterality Date    COLONOSCOPY N/A     NO RECORDS, OLD CHART NOT AVAILABLE, DR. DAHIANA CURTIS    COLONOSCOPY N/A 2005    RIGHT COLON LESION, PATH: TUBULAR ADENOMA, SIGMOID DIVERTICULOSIS, DR. JENNY XIE AT [facility]    COLONOSCOPY  N/A 07/05/2018    Procedure: COLONOSCOPY to cecum with hot snare polypectomy with resolution clip x 1, and spot tattoo marker;  Surgeon: Cathi Coto MD;  Location: [facility] ENDOSCOPY;  Service: Gastroenterology    COLONOSCOPY N/A 07/10/2020    Procedure: COLONOSCOPY INTO CECUM AND NORMAL TI WITH BX;  Surgeon: Jose Francisco Rodriguez MD;  Location: [facility] ENDOSCOPY;  Service: Gastroenterology;  Laterality: N/A;  PRE: DIARRHEA   POST: DIVERTICULOSIS, INK AND SCAR-POST POLYPECTOMY SITE, HEMORRHOIDS     ENDOSCOPY N/A 07/10/2020    Procedure: ESOPHAGOGASTRODUODENOSCOPY WITH BX;  Surgeon: Jose Francisco Rodriguez MD;  Location: [facility] ENDOSCOPY;  Service: Gastroenterology;  Laterality: N/A;  PRE: POSITIVE CELIAC ANTIBODIES   POST: NORMAL    EYE SURGERY Bilateral 2012    RELIEF OF ELEVATED INTRAOCULAR PRESSURE, PERFORMED IN [facility]    HEMORRHOID BANDING N/A 01/19/2006    DR. DAHIANA CURTIS    HEMORRHOID BANDING N/A 1980    NO RECORD AVAILABLE    KNEE ARTHROPLASTY Right 2013    D/T MENISCUS TEAR, DR. SANTACRUZ IN [place], [place]    KNEE ARTHROSCOPY Left 1980    REPLACEMENT TOTAL KNEE      TOTAL KNEE ARTHROPLASTY Right 03/2023       Family History: family history includes Alcohol abuse in his brother; Aneurysm in his mother; Aortic aneurysm in his mother; Glaucoma in his mother; Heart disease (age of onset: 60) in his brother; Heart disease (age of onset: 80) in his father; No Known Problems in his brother.    Social History:  reports that he quit smoking about 39 years ago. His smoking use included cigarettes. He started smoking about 68 years ago. He has a 29 pack-year smoking history. He has never used smokeless tobacco. He reports current alcohol use of about 7.0 standard drinks of alcohol per week. He reports that he does not use drugs.    Home Medications:  Prior to Admission medications    Medication Sig Start Date End Date Taking? Authorizing Provider   Eliquis 5 MG tablet tablet TAKE 1 TABLET TWICE DAILY 1/3/25   Yes Iain Carey III, MD   metoprolol succinate XL (TOPROL-XL) 25 MG 24 hr tablet TAKE 1 TABLET EVERY DAY 12/18/24  Yes Iain Carey III, MD   Misc Natural Products (Glucosamine Chond Cmp Advanced) tablet Take 1 tablet by mouth Daily.   Yes Provider, MD Frances   Probiotic Product (PROBIOTIC PO) Take 1 tablet by mouth Daily.   Yes Provider, MD Frances   timolol (TIMOPTIC) 0.5 % ophthalmic solution  4/29/21  Yes Provider, MD Frances   EPINEPHrine (EpiPen 2-Nathan) 0.3 MG/0.3ML solution auto-injector injection Inject 0.3 mL into the appropriate muscle as directed by prescriber 1 (One) Time As Needed (anaphlyxis .) for up to 4 doses. IF NEEDED FOR ALLERGIC REACTION. GO TO ER IMMEDIATELY AFTER ANY USE. 9/16/24   Bony Zhang DO       Allergies:  Allergies   Allergen Reactions    Prednisolone Unknown - High Severity     High Eye pressure    Oxycodone-Acetaminophen Nausea And Vomiting       Objective     Vitals:   Temp:  [97.5 °F (36.4 °C)-98.4 °F (36.9 °C)] 97.9 °F (36.6 °C)  Heart Rate:  [54-57] 56  Resp:  [16-18] 18  BP: (127-176)/(67-88) 127/71    Intake/Output Summary (Last 24 hours) at 5/21/2025 1404  Last data filed at 5/21/2025 1333  Gross per 24 hour   Intake 0 ml   Output 1400 ml   Net -1400 ml       Physical Exam:   Constitutional: Awake, age appropriate, pleasant, no acute distress.  HEENT: Sclera anicteric, no conjunctival injection  Neck: Supple, no JVD  Respiratory: CTA bilaterally, no rales  Cardiovascular: RRR, no murmurs, no rubs  Gastrointestinal: BS +, abdomen is soft, nontender and nondistended  : No palpable bladder  Musculoskeletal: no edema, no clubbing or cyanosis  Psychiatric: Appropriate affect, cooperative  Neurologic: Oriented x3, moving all extremities, normal speech and mental status  Skin: Warm and dry       Scheduled Meds:     apixaban, 5 mg, Oral, BID  famotidine, 40 mg, Oral, Daily  lactobacillus acidophilus, 1 capsule, Oral, Daily  metoprolol succinate XL, 25 mg, Oral,  Daily  sodium chloride, 10 mL, Intravenous, Q12H  timolol, 1 drop, Both Eyes, Daily      IV Meds:        Results Reviewed:   I have personally reviewed the results from the time of this admission to 5/21/2025 14:04 EDT     Lab Results   Component Value Date    GLUCOSE 89 05/21/2025    CALCIUM 8.7 05/21/2025     (L) 05/21/2025    K 4.1 05/21/2025    CO2 24.0 05/21/2025    CL 97 (L) 05/21/2025    BUN 12 05/21/2025    CREATININE 0.70 (L) 05/21/2025    EGFRIFAFRI 84 06/15/2020    EGFRIFNONA 69 06/15/2020    BCR 17.1 05/21/2025    ANIONGAP 9.0 05/21/2025      Lab Results   Component Value Date    MG 2.1 05/21/2025    PHOS 3.1 05/21/2025    ALBUMIN 4.1 05/20/2025           Assessment / Plan       Hyponatremia      ASSESSMENT:  Hyponatremia - Na down to 126-127 over the weekend, up to 130 today w IVF, suggestive of hypovolemia; other contributors include poor solute intake, back pain stimulating ADH release.  Urine studies and low uric acid (3) point toward element of SIADH: Uosm 319, Oxana 65).  Euvolemic today. Renal fcn and other lytes fine  Paroxysmal A-fib, in NSR, on AC  HTN, BP labile 120s to 170s systolic, perhaps related in part to pain, on toprol XL  Anemia, mild, with adequate iron stores. Hgb 11.9  Low back pain, MRI lumbar/thoracic spine pending, await neuro eval  Alpha gal syndrome, dietitian consult  Hx prostate cancer    PLAN:  DC IVF  Continue fluid restriction 1500 cc/day for now    Thank you for involving us in the care of Steffen Leyva.  Please feel free to call with any questions.    Tra Corrigan MD  05/21/25  14:04 EDT    [facility]  [phone]      Please note that portions of this note were completed with a voice recognition program.

## 2025-05-21 NOTE — PLAN OF CARE
Goal Outcome Evaluation:         Pt rested between care, IV fluids at 100ml/hr continue. Complained of pain and was given tylenol per MAR. SB on monitor and RM air. Repositions independently. Up SBA in room and using urinal at bedside. A/o x4. In bed with call light in reach at this time.

## 2025-05-21 NOTE — CASE MANAGEMENT/SOCIAL WORK
Discharge Planning Assessment  [facility]     Patient Name: Steffen Leyva  MRN: [id number]  Today's Date: 5/21/2025    Admit Date: 5/20/2025    Plan: Home with spouse and continue OP PT with [facility] in [place]. Family to transport   Discharge Needs Assessment       Row Name 05/21/25 1658       Living Environment    People in Home spouse    Current Living Arrangements home    Potentially Unsafe Housing Conditions none    In the past 12 months has the electric, gas, oil, or water company threatened to shut off services in your home? No    Primary Care Provided by self    Provides Primary Care For no one    Family Caregiver if Needed spouse    Quality of Family Relationships helpful;involved;supportive       Resource/Environmental Concerns    Resource/Environmental Concerns none    Transportation Concerns none       Transportation Needs    In the past 12 months, has lack of transportation kept you from medical appointments or from getting medications? no    In the past 12 months, has lack of transportation kept you from meetings, work, or from getting things needed for daily living? No       Food Insecurity    Within the past 12 months, you worried that your food would run out before you got the money to buy more. Never true    Within the past 12 months, the food you bought just didn't last and you didn't have money to get more. Never true       Transition Planning    Patient/Family Anticipates Transition to home with family    Patient/Family Anticipated Services at Transition rehabilitation services    Transportation Anticipated family or friend will provide       Discharge Needs Assessment    Readmission Within the Last 30 Days no previous admission in last 30 days    Equipment Currently Used at Home cpap;cane, straight    Concerns to be Addressed care coordination/care conferences;discharge planning    Do you want help finding or keeping work or a job? I do not need or want help    Do you want help with  school or training? For example, starting or completing job training or getting a high school diploma, GED or equivalent No    Anticipated Changes Related to Illness none    Equipment Needed After Discharge none    Outpatient/Agency/Support Group Needs outpatient therapy    Provided Post Acute Provider List? Refused    Provided Post Acute Provider Quality & Resource List? Refused    Offered/Gave Vendor List no    Patient's Choice of Community Agency(s) Pt refuses HH. States he has OP PT setup at [facility] in [place]    Current Discharge Risk chronically ill                   Discharge Plan       Row Name 05/21/25 1706       Plan    Plan Home with spouse and continue OP PT with [facility] in [place]. Family to transport    Patient/Family in Agreement with Plan yes    Plan Comments Met with pt and wife at bedside. Explained roll of . Face sheet and pharmacy verified. Pt lives with his wife in a single-story home with 1 step to enter. Home DME includes a CPAP and cane that he occasionally uses.  Pt is independent with ADLs. Pt has never been to Rehab or used HH. Pt's PCP is Bony Zhang DO. Enrolled in Meds to Bed.  Pt no longer drives. States his wife transports him to appointments. At discharge, family will transport. Pt states he is setup for PT at [facility] in [place] and plans to continue with them at D/C. Explained that CCP would follow to assess for discharge needs.  Reuben Caal RN-BC                    Expected Discharge Date and Time       Expected Discharge Date Expected Discharge Time    May 22, 2025            Demographic Summary       Row Name 05/21/25 1654       General Information    Admission Type inpatient    Arrived From emergency department    Required Notices Provided Important Message from Medicare    Reason for Consult care coordination/care conference;discharge planning    Preferred Language English                   Functional Status       Row Name 05/21/25 165       Functional  Status    Usual Activity Tolerance good    Current Activity Tolerance moderate       Functional Status, IADL    Medications independent    Meal Preparation independent    Housekeeping independent    Shopping assistive equipment and person    If for any reason you need help with day-to-day activities such as bathing, preparing meals, shopping, managing finances, etc., do you get the help you need? I get all the help I need       Mental Status    General Appearance WDL WDL       Mental Status Summary    Recent Changes in Mental Status/Cognitive Functioning no changes       Employment/    Employment Status retired                        Reuben Caal RN

## 2025-05-21 NOTE — PLAN OF CARE
Goal Outcome Evaluation:  Plan of Care Reviewed With: patient        Progress: improving  Outcome Evaluation: VSS. Pt c/o intermittent back pain that is relieved by PRN tylenol. MRI of lumbar and throacic spine ordered. Sodium improving. IV fluids stopped. Wife at bedside. Pt is stand by assist to ambulate and uses urinal. POC continued.

## 2025-05-21 NOTE — CASE MANAGEMENT/SOCIAL WORK
Continued Stay Note  [facility]     Patient Name: Steffen Leyva  MRN: [id number]  Today's Date: 5/21/2025    Admit Date: 5/20/2025    Plan: Home with spouse and continue OP PT with [facility] in [place]. Family to transport   Discharge Plan       Row Name 05/21/25 1706       Plan    Plan Home with spouse and continue OP PT with [facility] in [place]. Family to transport    Patient/Family in Agreement with Plan yes    Plan Comments Met with pt and wife at bedside. Explained roll of . Face sheet and pharmacy verified. Pt lives with his wife in a single-story home with 1 step to enter. Home DME includes a CPAP and cane that he occasionally uses.  Pt is independent with ADLs. Pt has never been to Rehab or used HH. Pt's PCP is Bony Zhang DO. Enrolled in Meds to Bed.  Pt no longer drives. States his wife transports him to appointments. At discharge, family will transport. Pt states he is setup for PT at [facility] in [place] and plans to continue with them at D/C. Explained that CCP would follow to assess for discharge needs.  Reuben Caal RN-BC                   Discharge Codes    No documentation.                 Expected Discharge Date and Time       Expected Discharge Date Expected Discharge Time    May 22, 2025               Reuben Caal RN

## 2025-05-21 NOTE — PROGRESS NOTES
"DAILY PROGRESS NOTE  [facility]    Patient Identification:  Name: Steffen Leyva  Age: 89 y.o.  Sex: male  :  1935  MRN: [id number]         Primary Care Physician: Bony Zhang DO    Subjective:  Interval History: He complains of back pain and is weak.    Objective:    Scheduled Meds:apixaban, 5 mg, Oral, BID  famotidine, 40 mg, Oral, Daily  lactobacillus acidophilus, 1 capsule, Oral, Daily  metoprolol succinate XL, 25 mg, Oral, Daily  sodium chloride, 10 mL, Intravenous, Q12H  timolol, 1 drop, Both Eyes, Daily      Continuous Infusions:     Vital signs in last 24 hours:  Temp:  [97.5 °F (36.4 °C)-98.4 °F (36.9 °C)] 97.9 °F (36.6 °C)  Heart Rate:  [54-57] 56  Resp:  [16-18] 18  BP: (127-176)/(67-88) 127/71    Intake/Output:    Intake/Output Summary (Last 24 hours) at 2025 1601  Last data filed at 2025 1333  Gross per 24 hour   Intake 0 ml   Output 1400 ml   Net -1400 ml       Exam:  /71 (BP Location: Right arm, Patient Position: Sitting)   Pulse 56   Temp 97.9 °F (36.6 °C) (Oral)   Resp 18   Ht 172.7 cm (68\")   Wt 67.7 kg (149 lb 4.8 oz)   SpO2 100%   BMI 22.70 kg/m²     General Appearance:    Alert, cooperative, no distress   Head:    Normocephalic, without obvious abnormality, atraumatic   Eyes:       Throat:   Lips, tongue, gums normal   Neck:   Supple, symmetrical, trachea midline, no JVD   Lungs:     Clear to auscultation bilaterally, respirations unlabored   Chest Wall:    No tenderness or deformity    Heart:    Regular rate and rhythm, S1 and S2 normal, no murmur,no  rub or gallop   Abdomen:     Soft, nontender, bowel sounds active, no masses, no organomegaly    Extremities:   Extremities normal, atraumatic, no cyanosis or edema   Pulses:      Skin:   Skin is warm and dry,  no rashes or palpable lesions   Neurologic:   no focal deficits noted      Lab Results (last 72 hours)       Procedure Component Value Units Date/Time    Uric Acid [[id number]]  (Abnormal) " Collected: 05/21/25 0642    Specimen: Blood Updated: 05/21/25 1408     Uric Acid 3.0 mg/dL     Folate [[id number]]  (Normal) Collected: 05/21/25 0642    Specimen: Blood Updated: 05/21/25 0902     Folate 19.20 ng/mL     Narrative:      Results may be falsely increased if patient taking Biotin.      Vitamin B12 [[id number]]  (Normal) Collected: 05/21/25 0642    Specimen: Blood Updated: 05/21/25 0800     Vitamin B-12 795 pg/mL     Narrative:      Results may be falsely increased if patient taking Biotin.      Cortisol [[id number]] Collected: 05/21/25 0642    Specimen: Blood Updated: 05/21/25 0749     Cortisol 10.90 mcg/dL     Narrative:      Cortisol Reference Ranges:    Cortisol 6AM - 10AM Range: 6.02-18.40 mcg/dl  Cortisol 4PM - 8PM Range: 2.68-10.50 mcg/dl      Results may be falsely increased if patient taking Biotin.      Basic Metabolic Panel [[id number]]  (Abnormal) Collected: 05/21/25 0642    Specimen: Blood Updated: 05/21/25 0745     Glucose 89 mg/dL      BUN 12 mg/dL      Creatinine 0.70 mg/dL      Sodium 130 mmol/L      Potassium 4.1 mmol/L      Chloride 97 mmol/L      CO2 24.0 mmol/L      Calcium 8.7 mg/dL      BUN/Creatinine Ratio 17.1     Anion Gap 9.0 mmol/L      eGFR 87.5 mL/min/1.73     Narrative:      GFR Categories in Chronic Kidney Disease (CKD)              GFR Category          GFR (mL/min/1.73)    Interpretation  G1                    90 or greater        Normal or high (1)  G2                    60-89                Mild decrease (1)  G3a                   45-59                Mild to moderate decrease  G3b                   30-44                Moderate to severe decrease  G4                    15-29                Severe decrease  G5                    14 or less           Kidney failure    (1)In the absence of evidence of kidney disease, neither GFR category G1 or G2 fulfill the criteria for CKD.    eGFR calculation 2021 CKD-EPI creatinine equation, which does not include race as a factor     Phosphorus [[id number]]  (Normal) Collected: 05/21/25 0642    Specimen: Blood Updated: 05/21/25 0745     Phosphorus 3.1 mg/dL     Magnesium [[id number]]  (Normal) Collected: 05/21/25 0642    Specimen: Blood Updated: 05/21/25 0745     Magnesium 2.1 mg/dL     Iron Profile [[id number]]  (Normal) Collected: 05/21/25 0642    Specimen: Blood Updated: 05/21/25 0745     Iron 62 mcg/dL      Iron Saturation (TSAT) 21 %      Transferrin 201 mg/dL      TIBC 299 mcg/dL     CBC Auto Differential [[id number]]  (Abnormal) Collected: 05/21/25 0642    Specimen: Blood Updated: 05/21/25 0724     WBC 4.10 10*3/mm3      RBC 3.73 10*6/mm3      Hemoglobin 11.9 g/dL      Hematocrit 36.3 %      MCV 97.3 fL      MCH 31.9 pg      MCHC 32.8 g/dL      RDW 12.5 %      RDW-SD 44.5 fl      MPV 8.7 fL      Platelets 286 10*3/mm3      Neutrophil % 46.4 %      Lymphocyte % 31.7 %      Monocyte % 14.1 %      Eosinophil % 6.6 %      Basophil % 1.0 %      Immature Grans % 0.2 %      Neutrophils, Absolute 1.90 10*3/mm3      Lymphocytes, Absolute 1.30 10*3/mm3      Monocytes, Absolute 0.58 10*3/mm3      Eosinophils, Absolute 0.27 10*3/mm3      Basophils, Absolute 0.04 10*3/mm3      Immature Grans, Absolute 0.01 10*3/mm3      nRBC 0.0 /100 WBC     Reticulocytes [[id number]]  (Normal) Collected: 05/21/25 0642    Specimen: Blood Updated: 05/21/25 0724     Reticulocyte % 1.31 %      Reticulocyte Absolute 0.0489 10*6/mm3     Calcitriol (1,25 di-OH Vitamin D) [[id number]] Collected: 05/21/25 0642    Specimen: Blood Updated: 05/21/25 0717    Osmolality, Urine - Urine, Clean Catch [[id number]] Collected: 05/20/25 2053    Specimen: Urine, Clean Catch Updated: 05/20/25 2144     Osmolality, Urine 319 mOsm/kg     Narrative:      Osmo Normal Reference Ranges:    Random:  mOsm/kg H2O, depending on fluid intake.  Random: >850 mOsm/kg H20, after 12 hour fluid restriction.    24 Hour: 300-900 mOsm/kg H2O.    Sodium, Urine, Random - Urine, Clean Catch [[id number]] Collected:  05/20/25 2053    Specimen: Urine, Clean Catch Updated: 05/20/25 2123     Sodium, Urine 65 mmol/L     Narrative:      Reference intervals for random urine have not been established.  Clinical usage is dependent upon physician's interpretation in combination with other laboratory tests.       Urinalysis With Microscopic If Indicated (No Culture) - Urine, Clean Catch [[id number]]  (Abnormal) Collected: 05/20/25 2053    Specimen: Urine, Clean Catch Updated: 05/20/25 2120     Color, UA Yellow     Appearance, UA Clear     pH, UA 7.0     Specific Gravity, UA 1.009     Glucose, UA Negative     Ketones, UA Negative     Bilirubin, UA Negative     Blood, UA Small (1+)     Protein, UA Negative     Leuk Esterase, UA Negative     Nitrite, UA Negative     Urobilinogen, UA 0.2 E.U./dL    Urinalysis, Microscopic Only - Urine, Clean Catch [[id number]]  (Abnormal) Collected: 05/20/25 2053    Specimen: Urine, Clean Catch Updated: 05/20/25 2120     RBC, UA 3-5 /HPF      WBC, UA 0-2 /HPF      Bacteria, UA None Seen /HPF      Squamous Epithelial Cells, UA None Seen /HPF      Hyaline Casts, UA None Seen /LPF      Methodology Automated Microscopy    Comprehensive Metabolic Panel [[id number]]  (Abnormal) Collected: 05/20/25 2032    Specimen: Blood Updated: 05/20/25 2107     Glucose 97 mg/dL      BUN 18 mg/dL      Creatinine 0.77 mg/dL      Sodium 125 mmol/L      Potassium 4.6 mmol/L      Chloride 93 mmol/L      CO2 23.0 mmol/L      Calcium 8.7 mg/dL      Total Protein 6.9 g/dL      Albumin 4.1 g/dL      ALT (SGPT) 8 U/L      AST (SGOT) 16 U/L      Alkaline Phosphatase 101 U/L      Total Bilirubin 0.2 mg/dL      Globulin 2.8 gm/dL      A/G Ratio 1.5 g/dL      BUN/Creatinine Ratio 23.4     Anion Gap 9.0 mmol/L      eGFR 85.0 mL/min/1.73     Narrative:      GFR Categories in Chronic Kidney Disease (CKD)              GFR Category          GFR (mL/min/1.73)    Interpretation  G1                    90 or greater        Normal or high (1)  G2                     60-89                Mild decrease (1)  G3a                   45-59                Mild to moderate decrease  G3b                   30-44                Moderate to severe decrease  G4                    15-29                Severe decrease  G5                    14 or less           Kidney failure    (1)In the absence of evidence of kidney disease, neither GFR category G1 or G2 fulfill the criteria for CKD.    eGFR calculation 2021 CKD-EPI creatinine equation, which does not include race as a factor    CBC & Differential [[id number]]  (Abnormal) Collected: 05/20/25 2032    Specimen: Blood Updated: 05/20/25 2056    Narrative:      The following orders were created for panel order CBC & Differential.  Procedure                               Abnormality         Status                     ---------                               -----------         ------                     CBC Auto Differential[[id number]]        Abnormal            Final result                 Please view results for these tests on the individual orders.    CBC Auto Differential [[id number]]  (Abnormal) Collected: 05/20/25 2032    Specimen: Blood Updated: 05/20/25 2056     WBC 4.53 10*3/mm3      RBC 3.77 10*6/mm3      Hemoglobin 12.0 g/dL      Hematocrit 36.4 %      MCV 96.6 fL      MCH 31.8 pg      MCHC 33.0 g/dL      RDW 12.5 %      RDW-SD 44.1 fl      MPV 8.7 fL      Platelets 316 10*3/mm3      Neutrophil % 48.5 %      Lymphocyte % 31.6 %      Monocyte % 12.1 %      Eosinophil % 6.0 %      Basophil % 1.1 %      Immature Grans % 0.7 %      Neutrophils, Absolute 2.20 10*3/mm3      Lymphocytes, Absolute 1.43 10*3/mm3      Monocytes, Absolute 0.55 10*3/mm3      Eosinophils, Absolute 0.27 10*3/mm3      Basophils, Absolute 0.05 10*3/mm3      Immature Grans, Absolute 0.03 10*3/mm3      nRBC 0.0 /100 WBC     TSH Rfx On Abnormal To Free T4 [[id number]]  (Normal) Collected: 05/20/25 0841    Specimen: Blood Updated: 05/20/25 2055     TSH 2.860 uIU/mL   "         Data Review:  Results from last 7 days   Lab Units 05/21/25 0642 05/20/25 2032 05/20/25  0841   SODIUM mmol/L 130* 125* 126*   POTASSIUM mmol/L 4.1 4.6 4.4   CHLORIDE mmol/L 97* 93* 91*   CO2 mmol/L 24.0 23.0 25.2   BUN mg/dL 12 18 13   CREATININE mg/dL 0.70* 0.77 0.85   GLUCOSE mg/dL 89 97 96   CALCIUM mg/dL 8.7 8.7 9.2     Results from last 7 days   Lab Units 05/21/25 0642 05/20/25 2032 05/16/25  1531   WBC 10*3/mm3 4.10 4.53 5.05   HEMOGLOBIN g/dL 11.9* 12.0* 12.9*   HEMATOCRIT % 36.3* 36.4* 39.6   PLATELETS 10*3/mm3 286 316 378     Results from last 7 days   Lab Units 05/20/25  0841   TSH uIU/mL 2.860         No results found for: \"TROPONINT\"      Results from last 7 days   Lab Units 05/20/25 2032 05/16/25  1531   ALK PHOS U/L 101 114   BILIRUBIN mg/dL 0.2 0.3   ALT (SGPT) U/L 8 12   AST (SGOT) U/L 16 20     Results from last 7 days   Lab Units 05/20/25  0841   TSH uIU/mL 2.860         No results found for: \"POCGLU\"        Past Medical History:   Diagnosis Date    Arthritis     Atrial fibrillation     Cataract     Corneal abrasion, left 07/23/2015    SEEN AT [facility] ER    COVID-19 09/2023    Elevated PSA 07/2015    Glaucoma     Hemorrhoids     Hypertension     IGT (impaired glucose tolerance) 07/2015    Prostate cancer 07/2019       Assessment:  Active Hospital Problems    Diagnosis  POA    **Hyponatremia [E87.1]  Unknown    Back pain [M54.9]  Unknown    SHELTON (obstructive sleep apnea) [G47.33]  Yes    Paroxysmal atrial fibrillation [I48.0]  Yes    Malignant neoplasm of prostate [C61]  Yes    Celiac disease [K90.0]  Yes    Arthritis [M19.90]  Yes      Resolved Hospital Problems   No resolved problems to display.       Plan:  Continue with fluid restriction ask for nephrology consult.  Will get MRI of thoracic and lumbar spine and ask for neurosurgery consult to evaluate his back pain.  Follow-up on labs.    Yogesh Baum MD  5/21/2025  16:02 EDT   "

## 2025-05-21 NOTE — H&P
"HISTORY AND PHYSICAL   [facility]        Patient Identification:  Name: Steffen Leyva  Age: 89 y.o.  Sex: male  :  1935  MRN: [id number]                     Primary Care Physician: Bony Zhang DO    Chief Complaint: Abnormal labs.    History of Present Illness:   Pleasant 89-year-old gentleman referred for a direct admission when he was noted to have a sodium of 127 and then 126 on recheck.  At this point the patient thinks his problem started around May 1 when he was picking up a trash can he developed severe back pain.  Plain films were obtained which revealed \"very slight compression deformity of T12\" this was of uncertain age.  No radicular component no alarm symptoms at that time.  He notes the back pain has been slowly improving and is easily managed with Tylenol at this point.  He notes that since then he has had a very poor appetite and his oral intake both solids and liquids has been poor.  He has been feeling very tired and states that he spends a lot of time just lying around.  It is noted that he saw his PCP on 2025 and at that point relay that he had been having some abdominal issues.  Apparently he perceives himself to be constipated and then was taking excessive amounts of Dulcolax, MiraLAX and prunes to the point that he is having near urge incontinence.  There is 1 reported event of vomiting.  He has backed off on his use of laxatives with much improvement since then.  At this point he has no acute complaints but still notes that he feels very tired.  Aside from hyponatremia and concomitant lowering chloride level his CMP is unremarkable.  Urinalysis is notable for having a specific gravity in excess of 1.03.        Past Medical History:  Past Medical History:   Diagnosis Date    Arthritis     Atrial fibrillation     Cataract     Corneal abrasion, left 2015    SEEN AT [facility] ER    COVID-19 2023    Elevated PSA 2015    Glaucoma     Hemorrhoids     Hypertension     " IGT (impaired glucose tolerance) 07/2015    Prostate cancer 07/2019     Past Surgical History:  Past Surgical History:   Procedure Laterality Date    COLONOSCOPY N/A 1994    NO RECORDS, OLD CHART NOT AVAILABLE, DR. DAHIANA CURTIS    COLONOSCOPY N/A 12/07/2005    RIGHT COLON LESION, PATH: TUBULAR ADENOMA, SIGMOID DIVERTICULOSIS, DR. JENNY XIE AT [facility]    COLONOSCOPY N/A 07/05/2018    Procedure: COLONOSCOPY to cecum with hot snare polypectomy with resolution clip x 1, and spot tattoo marker;  Surgeon: Cathi Coto MD;  Location:  [facility];  Service: Gastroenterology    COLONOSCOPY N/A 07/10/2020    Procedure: COLONOSCOPY INTO CECUM AND NORMAL TI WITH BX;  Surgeon: Jose Francisco Rodriguez MD;  Location:  [facility];  Service: Gastroenterology;  Laterality: N/A;  PRE: DIARRHEA   POST: DIVERTICULOSIS, INK AND SCAR-POST POLYPECTOMY SITE, HEMORRHOIDS     ENDOSCOPY N/A 07/10/2020    Procedure: ESOPHAGOGASTRODUODENOSCOPY WITH BX;  Surgeon: Jose Francisco Rodriguez MD;  Location: [facility];  Service: Gastroenterology;  Laterality: N/A;  PRE: POSITIVE CELIAC ANTIBODIES   POST: NORMAL    EYE SURGERY Bilateral 2012    RELIEF OF ELEVATED INTRAOCULAR PRESSURE, PERFORMED IN [facility]    HEMORRHOID BANDING N/A 01/19/2006    DR. DAHIANA CURTIS    HEMORRHOID BANDING N/A 1980    NO RECORD AVAILABLE    KNEE ARTHROPLASTY Right 2013    D/T MENISCUS TEAR, DR. SANTACRUZ IN [place], [place]    KNEE ARTHROSCOPY Left 1980    REPLACEMENT TOTAL KNEE      TOTAL KNEE ARTHROPLASTY Right 03/2023      Home Meds:  Medications Prior to Admission   Medication Sig Dispense Refill Last Dose/Taking    Eliquis 5 MG tablet tablet TAKE 1 TABLET TWICE DAILY 180 tablet 3 5/20/2025 Evening    metoprolol succinate XL (TOPROL-XL) 25 MG 24 hr tablet TAKE 1 TABLET EVERY DAY 90 tablet 3 5/20/2025 Morning    Misc Natural Products (Glucosamine Chond Cmp Advanced) tablet Take 1 tablet by mouth Daily.   5/20/2025 Morning    Probiotic Product  (PROBIOTIC PO) Take 1 tablet by mouth Daily.   2025 Morning    timolol (TIMOPTIC) 0.5 % ophthalmic solution    2025 Morning    EPINEPHrine (EpiPen 2-Nathan) 0.3 MG/0.3ML solution auto-injector injection Inject 0.3 mL into the appropriate muscle as directed by prescriber 1 (One) Time As Needed (anaphlyxis .) for up to 4 doses. IF NEEDED FOR ALLERGIC REACTION. GO TO ER IMMEDIATELY AFTER ANY USE. 2 each 1        Allergies:  Allergies   Allergen Reactions    Prednisolone Unknown - High Severity     High Eye pressure    Oxycodone-Acetaminophen Nausea And Vomiting     Immunizations:  Immunization History   Administered Date(s) Administered    COVID-19 (MODERNA) 12YRS+ (SPIKEVAX) 2024    COVID-19 (MODERNA) 1st,2nd,3rd Dose Monovalent 2021, 2021, 2021, 2022    Fluzone High-Dose 65+YRS 10/05/2019    Fluzone High-Dose 65+yrs 2021    Pneumococcal Conjugate 20-Valent (PCV20) 2022    Shingrix 2021, 10/15/2021     Social History:   Social History     Social History Narrative    Not on file     Social History     Tobacco Use    Smoking status: Former     Current packs/day: 0.00     Average packs/day: 1 pack/day for 29.0 years (29.0 ttl pk-yrs)     Types: Cigarettes     Start date: 7/10/1956     Quit date: 7/10/1985     Years since quittin.8    Smokeless tobacco: Never   Substance Use Topics    Alcohol use: Yes     Alcohol/week: 7.0 standard drinks of alcohol     Types: 7 Standard drinks or equivalent per week     Family History:  Family History   Problem Relation Age of Onset    Aortic aneurysm Mother     Glaucoma Mother     Aneurysm Mother     Heart disease Father 80    Heart disease Brother 60    Alcohol abuse Brother     No Known Problems Brother         Review of Systems  Review of Systems   Constitutional:         As per history of present illness.   HENT: Negative.     Eyes: Negative.    Respiratory: Negative.     Cardiovascular: Negative.    Gastrointestinal:          As per history of present illness.   Endocrine: Negative.    Genitourinary: Negative.    Musculoskeletal:         As per history of present illness.   Skin: Negative.    Allergic/Immunologic: Negative.    Neurological: Negative.    Hematological: Negative.    Psychiatric/Behavioral: Negative.         Objective:  T Max 24 hrs: Temp (24hrs), Av.4 °F (36.9 °C), Min:98.4 °F (36.9 °C), Max:98.4 °F (36.9 °C)    Vitals Ranges:   Temp:  [98.4 °F (36.9 °C)] 98.4 °F (36.9 °C)  Heart Rate:  [54] 54  Resp:  [18] 18  BP: (176)/(77) 176/77      Exam:  Physical Exam  Constitutional:       General: He is not in acute distress.     Appearance: He is normal weight. He is not ill-appearing, toxic-appearing or diaphoretic.      Comments: Patient does appear a bit thin and frail.   HENT:      Head: Normocephalic and atraumatic.      Nose: Nose normal.      Mouth/Throat:      Comments: Mucous membranes borderline dry.  Eyes:      General: No scleral icterus.        Right eye: No discharge.         Left eye: No discharge.      Extraocular Movements: Extraocular movements intact.      Conjunctiva/sclera: Conjunctivae normal.   Cardiovascular:      Rate and Rhythm: Normal rate and regular rhythm.      Heart sounds:      No friction rub. No gallop.   Pulmonary:      Effort: Pulmonary effort is normal.      Breath sounds: Normal breath sounds.   Abdominal:      General: Abdomen is flat. Bowel sounds are normal. There is no distension.      Palpations: Abdomen is soft. There is no mass.      Tenderness: There is no abdominal tenderness. There is no guarding or rebound.   Musculoskeletal:      Cervical back: Neck supple.      Right lower leg: No edema.      Left lower leg: No edema.   Skin:     General: Skin is warm and dry.   Neurological:      General: No focal deficit present.      Mental Status: He is alert and oriented to person, place, and time.   Psychiatric:         Mood and Affect: Mood normal.         Behavior: Behavior normal.          Thought Content: Thought content normal.         Judgment: Judgment normal.         Data Review:  All labs and radiology reviewed.    Assessment:  Hyponatremia: By history and present exam it appears is most likely going to be due to poor oral intake with a mild dehydration.  This would go along with elevated specific gravity.  Urine sodium and osmolality has been ordered.  Pending results I would gently hydrate with normal saline overnight while restricting hypotonic fluids.  Further recommendations to follow.  Will also check TSH and a.m. cortisol level.  Fatigue: Likely multifactorial.  Check TSH.  Look for possible improvement with hydration and correction of hyponatremia.... Awaiting orthostatic pressures.  Paroxysmal atrial fibrillation: Continue rate control and anticoagulation.  Anemia: Hemoglobin down from his baseline.  No evidence of active blood loss.  Monitor.  Check B12, folate, iron panel.  Check reticulocyte count.  Vitamin D deficiency: Recheck vitamin D level and supplement as needed.  Suspected may be an overall nutritional issue and will request nutrition evaluation.  Low back pain: Slowly improving.  Further evaluation when electrolyte issues stabilize.      Plan:  Please see above.  Discussed with patient.  Discussed with PCP.    Joshua Donahue MD  5/20/2025  21:34 EDT    EMR Dragon/Transcription disclaimer:   Much of this encounter note is an electronic transcription/translation of spoken language to printed text. The electronic translation of spoken language may permit erroneous, or at times, nonsensical words or phrases to be inadvertently transcribed; Although I have reviewed the note for such errors, some may still exist.

## 2025-05-21 NOTE — CONSULTS
".Nutrition Services    Patient Name: Steffen Leyva  YOB: 1935  MRN: [id number]  Admission date: 5/20/2025    Assessment Date:  05/21/25    COMPREHENSIVE NUTRITION EVALUATION      Reason for Encounter Malnutrition Severity Assessment, MST score 2+, Physician Consult   Diagnosis/Problem Admission Diagnosis:  Hyponatremia [E87.1]    Problem List:    Hyponatremia     Narrative 89 yo male admitted for hyponatremia. Reported to physician he has not had an appetite and thought himself constipated which led to excessive laxative intake. Hx of functional diarrhea, cancer, and Celiac disease.    RD visited pt at beside Pt with family, alert, and conversational. Pt reports not having an appetite for 6-8 months. He gets tired eating and will only eat a few bites before he is full. Pt reports he is not able to eat mammal foods, has a dairy intolerance, and possible gluten intolerance. Pt agreed to try Boost Breeze between meals.     NFPE conducted with pt consent       PO Diet Diet: Regular/House, Fluid Restriction (240 mL/tray); 1500 mL/day; Fluid Consistency: Thin (IDDSI 0)   Allergies NKFA   Supplements n/a   PO Intake % 25-50%        Chewing/Swallowing Difficulty no issues identified at this time and other:some teeth missing       Medications reviewed - Pepcid, Risaquad   Labs  reviewed - hyponatremia (130) trending up, hypochloridemia (97) trending up,        Physical Findings alert, oriented, room air     Edema no edema    GI Function WDL, last bowel movement: 5/20   Skin Status skin intact    Lines/Drains none   I/O reviewed        Height  Weight  BMI  Weight Trend     Height: 172.7 cm (68\")  Weight: 67.7 kg (149 lb 4.8 oz) (05/20/25 2025)  Body mass index is 22.7 kg/m².  Stable, Amount/Timeframe: 10 years    Weight change: No significant changes       Estimated Nutrition Needs      Weight used  68.4 kg (IBW)    Calories  1368 - 1504 kcal / day (20-22 kcal/kg)    Protein  69 - 82 gm / day (1.0 - 1.2 gm/kg) "    Fluid  1500 mL / day (Defer to physician, Per fluid restriction)          NFPE Consented to exam, See Malnutrition Severity Assessment, Date Completed: 5/21    NFPE completed, consistent with nutrition diagnosis of moderate malnutrition using AND/ASPEN criteria. See MSA below.          Nutrition Problem (PES) Problem: Malnutrition (moderate) and Impaired Nutrient Utilization  Etiology: Medical Diagnosis - celiac disease    Signs/Symptoms: Report of Minimal PO Intake, NFPE Results, and Other (comment) diet restrictions possible contributor and early satiety per pt.       Intervention/Plan Monitor and encourage good PO intake.     Add Boost Breeze TID (Provides 750 kcals, 42 g protein if consumed) to support PO intake.    Replace electrolytes per protocol.     RD to follow up per protocol.     Malnutrition Severity Assessment      Patient meets criteria for : Moderate (non-severe) Malnutrition  Malnutrition Type (Last 8 Hours)       Malnutrition Severity Assessment       Row Name 05/21/25 1508       Malnutrition Severity Assessment    Malnutrition Type Chronic Disease - Related Malnutrition      Row Name 05/21/25 1508       Insufficient Energy Intake     Insufficient Energy Intake Findings Moderate    Insufficient Energy Intake  <75% of est. energy requirement for > or equal to 1 month      Row Name 05/21/25 1508       Unintentional Weight Loss     Unintentional Weight Loss Findings None      Row Name 05/21/25 1508       Muscle Loss    Loss of Muscle Mass Findings Moderate    [place] Region Moderate - slight depression    Clavicle Bone Region Moderate - some protrusion in females, visible in males    Acromion Bone Region Moderate - acromion may slightly protrude    Patellar Region Moderate - patella more prominent, less muscle definition around patella    Posterior Calf Region Moderate - some roundness, slight firmness      Row Name 05/21/25 1508       Fat Loss    Subcutaneous Fat Loss Findings Mild      Row Name  "05/21/25 1508       Declining Functional Status    Declining Functional Status Findings N/A      Row Name 05/21/25 1508       Criteria Met (Must meet criteria for severity in at least 2 of these categories: M Wasting, Fat Loss, Fluid, Secondary Signs, Wt. Status, Intake)    Patient meets criteria for  Moderate (non-severe) Malnutrition                           Results from last 7 days   Lab Units 05/21/25  0642 05/20/25 2032 05/20/25  0841 05/16/25  1531   SODIUM mmol/L 130* 125* 126* 127*   POTASSIUM mmol/L 4.1 4.6 4.4 4.9   CHLORIDE mmol/L 97* 93* 91* 91*   CO2 mmol/L 24.0 23.0 25.2 25.1   BUN mg/dL 12 18 13 20   CREATININE mg/dL 0.70* 0.77 0.85 0.98   CALCIUM mg/dL 8.7 8.7 9.2 9.2   BILIRUBIN mg/dL  --  0.2  --  0.3   ALK PHOS U/L  --  101  --  114   ALT (SGPT) U/L  --  8  --  12   AST (SGOT) U/L  --  16  --  20   GLUCOSE mg/dL 89 97 96 122*     Results from last 7 days   Lab Units 05/21/25  0642   MAGNESIUM mg/dL 2.1   PHOSPHORUS mg/dL 3.1   HEMOGLOBIN g/dL 11.9*   HEMATOCRIT % 36.3*     No results found for: \"HGBA1C\"  Wt Readings from Last 10 Encounters:   05/20/25 67.7 kg (149 lb 4.8 oz)   05/16/25 66.2 kg (146 lb)   05/09/25 66.7 kg (147 lb)   04/30/25 66.7 kg (147 lb)   10/07/24 65.8 kg (145 lb)   09/16/24 66.5 kg (146 lb 9.6 oz)   05/22/24 64.4 kg (141 lb 14.4 oz)   05/06/24 65.3 kg (144 lb)   09/27/23 64 kg (141 lb)   08/07/23 65.3 kg (144 lb)       Electronically signed by:  Reina Duarte RD  05/21/25 07:45 EDT    "

## 2025-05-21 NOTE — THERAPY EVALUATION
Patient Name: Steffen Leyva  : 1935    MRN: [id number]                              Today's Date: 2025       Admit Date: 2025    Visit Dx: No diagnosis found.  Patient Active Problem List   Diagnosis    Arthritis    Abnormal prostate specific antigen    Borderline diabetes    Chronic anterior uveitis    Functional diarrhea    Adenomatous polyp of ascending colon    Celiac disease    Malignant neoplasm of prostate    Primary open angle glaucoma (POAG)    Squamous cell carcinoma of skin    Paroxysmal atrial fibrillation    SHELTON (obstructive sleep apnea)    Long COVID    Hyponatremia     Past Medical History:   Diagnosis Date    Arthritis     Atrial fibrillation     Cataract     Corneal abrasion, left 2015    SEEN AT [facility] ER    COVID-19 2023    Elevated PSA 2015    Glaucoma     Hemorrhoids     Hypertension     IGT (impaired glucose tolerance) 2015    Prostate cancer 2019     Past Surgical History:   Procedure Laterality Date    COLONOSCOPY N/A     NO RECORDS, OLD CHART NOT AVAILABLE, DR. DAHIANA CURTIS    COLONOSCOPY N/A 2005    RIGHT COLON LESION, PATH: TUBULAR ADENOMA, SIGMOID DIVERTICULOSIS, DR. JENNY XIE AT [facility]    COLONOSCOPY N/A 2018    Procedure: COLONOSCOPY to cecum with hot snare polypectomy with resolution clip x 1, and spot tattoo marker;  Surgeon: Cathi Coto MD;  Location: [facility] ENDOSCOPY;  Service: Gastroenterology    COLONOSCOPY N/A 07/10/2020    Procedure: COLONOSCOPY INTO CECUM AND NORMAL TI WITH BX;  Surgeon: Jose Francisco Rodriguez MD;  Location: [facility] ENDOSCOPY;  Service: Gastroenterology;  Laterality: N/A;  PRE: DIARRHEA   POST: DIVERTICULOSIS, INK AND SCAR-POST POLYPECTOMY SITE, HEMORRHOIDS     ENDOSCOPY N/A 07/10/2020    Procedure: ESOPHAGOGASTRODUODENOSCOPY WITH BX;  Surgeon: Jose Francisco Rodriguez MD;  Location: [facility] ENDOSCOPY;  Service: Gastroenterology;  Laterality: N/A;  PRE: POSITIVE CELIAC ANTIBODIES   POST: NORMAL    EYE SURGERY Bilateral  "2012    RELIEF OF ELEVATED INTRAOCULAR PRESSURE, PERFORMED IN [facility]    HEMORRHOID BANDING N/A 01/19/2006    DR. DAHIANA CURTIS    HEMORRHOID BANDING N/A 1980    NO RECORD AVAILABLE    KNEE ARTHROPLASTY Right 2013    D/T MENISCUS TEAR, DR. SANTACRUZ IN [facility] [place]    KNEE ARTHROSCOPY Left 1980    REPLACEMENT TOTAL KNEE      TOTAL KNEE ARTHROPLASTY Right 03/2023      General Information       Row Name 05/21/25 1237          OT Time and Intention    Subjective Information no complaints  -KR     Document Type discharge evaluation/summary  -KR     Mode of Treatment individual therapy;occupational therapy  -KR     Patient Effort good  -KR       Row Name 05/21/25 1237          General Information    Patient Profile Reviewed yes  -KR     Prior Level of Function independent:;ADL's;driving;yard work;community mobility;all household mobility  Pt lives with wife in [place] with 1STE. PLOF IND with I/ADLs and drives. Pt uses a cane at baseline for community mobility and furniture for balance at home 2/2 hx of \"balance problems\".  -KR     Existing Precautions/Restrictions fall  -KR     Barriers to Rehab none identified  -KR       Row Name 05/21/25 1237          Occupational Profile    Environmental Supports and Barriers (Occupational Profile) DME: cane, grab bars  -KR       Row Name 05/21/25 1237          Living Environment    Current Living Arrangements home  -KR     People in Home spouse  -KR       Row Name 05/21/25 1237          Home Main Entrance    Number of Stairs, Main Entrance one  -KR       Row Name 05/21/25 1237          Stairs Within Home, Primary    Number of Stairs, Within Home, Primary none  -KR       Row Name 05/21/25 1237          Cognition    Orientation Status (Cognition) oriented x 4  -KR       Row Name 05/21/25 1237          Safety Issues/Impairments Affecting Functional Mobility    Impairments Affecting Function (Mobility) balance  -KR               User Key  (r) = Recorded By, (t) = Taken By, " (c) = Cosigned By      Initials Name Provider Type    Jarek Adams OT Occupational Therapist                     Mobility/ADL's       Row Name 05/21/25 1244          Bed Mobility    Bed Mobility supine-sit  -KR     Supine-Sit [place] (Bed Mobility) contact guard;verbal cues  -KR     Comment, (Bed Mobility) pt with min cues for log rolling but demo good form and expresses no concerns  -KR       Row Name 05/21/25 1244          Transfers    Transfers sit-stand transfer;stand-sit transfer;toilet transfer  -KR       Row Name 05/21/25 1244          Sit-Stand Transfer    Sit-Stand [place] (Transfers) contact guard  -KR     Comment, (Sit-Stand Transfer) HHA  -KR       Row Name 05/21/25 1244          Stand-Sit Transfer    Stand-Sit [place] (Transfers) contact guard;verbal cues  -KR       Row Name 05/21/25 1244          Toilet Transfer    [place] Level (Toilet Transfer) contact guard  -KR     Assistive Device (Toilet Transfer) grab bars/safety frame  -KR       Row Name 05/21/25 1244          Functional Mobility    Functional Mobility- Ind. Level contact guard assist;1 person  -KR     Functional Mobility- Comment pt uses cane at baseline, used HHA-CGA for household distances with some unsteadiness but no overall LOB.  -KR     Patient was able to Ambulate yes  -KR       Row Name 05/21/25 1244          Activities of Daily Living    BADL Assessment/Intervention toileting  -KR       Row Name 05/21/25 1244          Toileting Assessment/Training    [place] Level (Toileting) toileting skills;set up;standby assist  -KR     Assistive Devices (Toileting) grab bar/safety frame  -KR               User Key  (r) = Recorded By, (t) = Taken By, (c) = Cosigned By      Initials Name Provider Type    Jarek Adams OT Occupational Therapist                   Obj/Interventions       Row Name 05/21/25 1250          Sensory Assessment (Somatosensory)    Sensory Assessment (Somatosensory) bilateral LE  -KR      Sensory Assessment pt with hx of neuropathy in BLE  -KR       Row Name 05/21/25 1250          Vision Assessment/Intervention    Visual Impairment/Limitations WNL  -KR       Row Name 05/21/25 1250          Range of Motion Comprehensive    General Range of Motion no range of motion deficits identified  -KR       Row Name 05/21/25 1250          Strength Comprehensive (MMT)    General Manual Muscle Testing (MMT) Assessment no strength deficits identified  -KR       Row Name 05/21/25 1250          Balance    Balance Assessment sitting static balance;sitting dynamic balance;standing static balance;standing dynamic balance  -KR     Static Sitting Balance standby assist  -KR     Dynamic Sitting Balance standby assist;contact guard  -KR     Position, Sitting Balance sitting edge of bed  -KR     Static Standing Balance contact guard  -KR     Dynamic Standing Balance contact guard;minimal assist  -KR               User Key  (r) = Recorded By, (t) = Taken By, (c) = Cosigned By      Initials Name Provider Type    Jarek Adams OT Occupational Therapist                   Goals/Plan       Row Name 05/21/25 1301          Transfer Goal 1 (OT)    Activity/Assistive Device (Transfer Goal 1, OT) toilet  -KR     [place] Level/Cues Needed (Transfer Goal 1, OT) contact guard required  -KR     Time Frame (Transfer Goal 1, OT) 1 day  -KR     Progress/Outcome (Transfer Goal 1, OT) goal met  -KR       Row Name 05/21/25 1301          Dressing Goal 1 (OT)    [place]/Cues Needed (Dressing Goal 1, OT) minimum assist (75% or more patient effort)  -KR               User Key  (r) = Recorded By, (t) = Taken By, (c) = Cosigned By      Initials Name Provider Type    Jarek Adams OT Occupational Therapist                   Clinical Impression       Row Name 05/21/25 1254          Pain Assessment    Pain Location back  -KR     Pain Side/Orientation lower  -KR     Pain Management Interventions premedicated for activity;exercise or  "physical activity utilized  -KR     Response to Pain Interventions functional ability unchanged  -KR     Pre/Posttreatment Pain Comment pt stating that he has some lower back pain but it was not bothering him as much this AM  -KR       Row Name 05/21/25 1255          Plan of Care Review    Plan of Care Reviewed With patient;family  -KR     Outcome Evaluation Pt is 89yom admitted for hyponatremia and some back pain. Pt with hx of \"very slight compression deformity of T12\" from earlier in the year. PMH includes celiac disease, prostate CA, glaucoma, neuropathy, and squamous cell skin cancer. Pt lives with wife in [facility] with 1STE. PLOF IND with I/ADLs and drives. Pt uses a cane at baseline for community mobility and furniture for balance at home 2/2 hx of \"balance problems\" Pt at this time able to complete functional mobility and ADLs with CGA and no AD. Pt with no acute OT needs and anticipated to be able to d/c home with help and cont OP therapy.  -KR       Row Name 05/21/25 1259          Therapy Assessment/Plan (OT)    Criteria for Skilled Therapeutic Interventions Met (OT) no;no problems identified which require skilled intervention  -KR     Therapy Frequency (OT) evaluation only  -KR       Row Name 05/21/25 1255          Therapy Plan Review/Discharge Plan (OT)    Anticipated Discharge Disposition (OT) home with outpatient therapy services;home with assist  -KR       Row Name 05/21/25 1254          Vital Signs    O2 Delivery Pre Treatment room air  -KR     O2 Delivery Intra Treatment room air  -KR     O2 Delivery Post Treatment room air  -KR     Pre Patient Position Supine  -KR     Intra Patient Position Standing  -KR     Post Patient Position Sitting  -KR       Row Name 05/21/25 1254          Positioning and Restraints    Pre-Treatment Position in bed  -KR     Post Treatment Position chair  -KR     In Chair notified nsg;reclined;sitting;call light within reach;encouraged to call for assist;exit alarm on;legs " elevated;with family/caregiver  -KR               User Key  (r) = Recorded By, (t) = Taken By, (c) = Cosigned By      Initials Name Provider Type    Jarek Adams OT Occupational Therapist                   Outcome Measures       Row Name 05/21/25 1304          How much help from another is currently needed...    Putting on and taking off regular lower body clothing? 4  -KR     Bathing (including washing, rinsing, and drying) 3  -KR     Toileting (which includes using toilet bed pan or urinal) 3  -KR     Putting on and taking off regular upper body clothing 4  -KR     Taking care of personal grooming (such as brushing teeth) 4  -KR     Eating meals 4  -KR     AM-PAC 6 Clicks Score (OT) 22  -KR       Row Name 05/21/25 0813          How much help from another person do you currently need...    Turning from your back to your side while in flat bed without using bedrails? 4  -AL     Moving from lying on back to sitting on the side of a flat bed without bedrails? 4  -AL     Moving to and from a bed to a chair (including a wheelchair)? 3  -AL     Standing up from a chair using your arms (e.g., wheelchair, bedside chair)? 3  -AL     Climbing 3-5 steps with a railing? 3  -AL     To walk in hospital room? 3  -AL     AM-PAC 6 Clicks Score (PT) 20  -AL       Row Name 05/21/25 1304          Functional Assessment    Outcome Measure Options AM-PAC 6 Clicks Daily Activity (OT)  -KR               User Key  (r) = Recorded By, (t) = Taken By, (c) = Cosigned By      Initials Name Provider Type    Dimas De Leon, RN Registered Nurse    Jarek Adams OT Occupational Therapist                    Occupational Therapy Education       Title: PT OT SLP Therapies (In Progress)       Topic: Occupational Therapy (In Progress)       Point: ADL training (Done)       Learning Progress Summary            Patient Acceptance, E,TB, VU by JEREMY at 5/21/2025 1309    Comment: role of OT, d/c planning                      Point: Precautions  "(Done)       Learning Progress Summary            Patient Acceptance, E,TB, VU by KR at 5/21/2025 2662    Comment: role of OT, d/c planning                                      User Key       Initials Effective Dates Name Provider Type Discipline     04/01/25 -  Jarek Hendricks OT Occupational Therapist OT                  OT Recommendation and Plan  Therapy Frequency (OT): evaluation only  Plan of Care Review  Plan of Care Reviewed With: patient, family  Outcome Evaluation: Pt is 89yom admitted for hyponatremia and some back pain. Pt with hx of \"very slight compression deformity of T12\" from earlier in the year. PMH includes celiac disease, prostate CA, glaucoma, neuropathy, and squamous cell skin cancer. Pt lives with wife in [facility] with 1STE. PLOF IND with I/ADLs and drives. Pt uses a cane at baseline for community mobility and furniture for balance at home 2/2 hx of \"balance problems\" Pt at this time able to complete functional mobility and ADLs with CGA and no AD. Pt with no acute OT needs and anticipated to be able to d/c home with help and cont OP therapy.     Time Calculation:   Evaluation Complexity (OT)  Review Occupational Profile/Medical/Therapy History Complexity: brief/low complexity  Assessment, Occupational Performance/Identification of Deficit Complexity: 1-3 performance deficits  Clinical Decision Making Complexity (OT): problem focused assessment/low complexity  Overall Complexity of Evaluation (OT): low complexity     Time Calculation- OT       Row Name 05/21/25 1312             Time Calculation- OT    OT Start Time 0854  -KR      OT Stop Time 0920  -KR      OT Time Calculation (min) 26 min  -KR      Total Timed Code Minutes- OT 12 minute(s)  -KR      OT Received On 05/21/25  -KR         Timed Charges    07117 - OT Therapeutic Activity Minutes 12  -KR         Untimed Charges    OT Eval/Re-eval Minutes 14  -KR         Total Minutes    Timed Charges Total Minutes 12  -KR      Untimed Charges Total " Minutes 14  -KR       Total Minutes 26  -KR                User Key  (r) = Recorded By, (t) = Taken By, (c) = Cosigned By      Initials Name Provider Type    Jarek Adams OT Occupational Therapist                  Therapy Charges for Today       Code Description Service Date Service Provider Modifiers Qty    58851600735  OT THERAPEUTIC ACT EA 15 MIN 5/21/2025 Jarek Hendricks OT GO 1    [id number]  OT EVAL LOW COMPLEXITY 2 5/21/2025 Jarek Hendricks OT GO 1                 Jarek Hendricks OT  5/21/2025

## 2025-05-21 NOTE — PLAN OF CARE
"Goal Outcome Evaluation:  Plan of Care Reviewed With: patient, family           Outcome Evaluation: Pt is 89yom admitted for hyponatremia and some back pain. Pt with hx of \"very slight compression deformity of T12\" from earlier in the year. PMH includes celiac disease, prostate CA, glaucoma, neuropathy, and squamous cell skin cancer. Pt lives with wife in [facility] with 1STE. PLOF IND with I/ADLs and drives. Pt uses a cane at baseline for community mobility and furniture for balance at home 2/2 hx of \"balance problems\" Pt at this time able to complete functional mobility and ADLs with CGA and no AD. Pt with no acute OT needs and anticipated to be able to d/c home with help and cont OP therapy.    Anticipated Discharge Disposition (OT): home with outpatient therapy services, home with assist                        "

## 2025-05-22 ENCOUNTER — READMISSION MANAGEMENT (OUTPATIENT)
Dept: CALL CENTER | Facility: HOSPITAL | Age: OVER 89
End: 2025-05-22
Payer: MEDICARE

## 2025-05-22 ENCOUNTER — TELEPHONE (OUTPATIENT)
Dept: NEUROSURGERY | Facility: CLINIC | Age: OVER 89
End: 2025-05-22
Payer: MEDICARE

## 2025-05-22 VITALS
BODY MASS INDEX: 22.63 KG/M2 | HEIGHT: 68 IN | WEIGHT: 149.3 LBS | TEMPERATURE: 98.2 F | RESPIRATION RATE: 18 BRPM | OXYGEN SATURATION: 100 % | HEART RATE: 57 BPM | SYSTOLIC BLOOD PRESSURE: 137 MMHG | DIASTOLIC BLOOD PRESSURE: 60 MMHG

## 2025-05-22 PROBLEM — S22.080A T12 COMPRESSION FRACTURE: Status: ACTIVE | Noted: 2025-05-22

## 2025-05-22 LAB
ANION GAP SERPL CALCULATED.3IONS-SCNC: 10 MMOL/L (ref 5–15)
BASOPHILS # BLD AUTO: 0.07 10*3/MM3 (ref 0–0.2)
BASOPHILS NFR BLD AUTO: 1.5 % (ref 0–1.5)
BUN SERPL-MCNC: 11 MG/DL (ref 8–23)
BUN/CREAT SERPL: 15.9 (ref 7–25)
CALCIUM SPEC-SCNC: 8.8 MG/DL (ref 8.2–9.6)
CHLORIDE SERPL-SCNC: 96 MMOL/L (ref 98–107)
CO2 SERPL-SCNC: 22 MMOL/L (ref 22–29)
CREAT SERPL-MCNC: 0.69 MG/DL (ref 0.76–1.27)
DEPRECATED RDW RBC AUTO: 41.8 FL (ref 37–54)
EGFRCR SERPLBLD CKD-EPI 2021: 87.9 ML/MIN/1.73
EOSINOPHIL # BLD AUTO: 0.28 10*3/MM3 (ref 0–0.4)
EOSINOPHIL NFR BLD AUTO: 5.9 % (ref 0.3–6.2)
ERYTHROCYTE [DISTWIDTH] IN BLOOD BY AUTOMATED COUNT: 12.1 % (ref 12.3–15.4)
GLUCOSE SERPL-MCNC: 84 MG/DL (ref 65–99)
HCT VFR BLD AUTO: 38.1 % (ref 37.5–51)
HGB BLD-MCNC: 12.8 G/DL (ref 13–17.7)
IMM GRANULOCYTES # BLD AUTO: 0.02 10*3/MM3 (ref 0–0.05)
IMM GRANULOCYTES NFR BLD AUTO: 0.4 % (ref 0–0.5)
LYMPHOCYTES # BLD AUTO: 1.61 10*3/MM3 (ref 0.7–3.1)
LYMPHOCYTES NFR BLD AUTO: 33.7 % (ref 19.6–45.3)
MAGNESIUM SERPL-MCNC: 2.4 MG/DL (ref 1.6–2.4)
MCH RBC QN AUTO: 31.4 PG (ref 26.6–33)
MCHC RBC AUTO-ENTMCNC: 33.6 G/DL (ref 31.5–35.7)
MCV RBC AUTO: 93.4 FL (ref 79–97)
MONOCYTES # BLD AUTO: 0.6 10*3/MM3 (ref 0.1–0.9)
MONOCYTES NFR BLD AUTO: 12.6 % (ref 5–12)
NEUTROPHILS NFR BLD AUTO: 2.2 10*3/MM3 (ref 1.7–7)
NEUTROPHILS NFR BLD AUTO: 45.9 % (ref 42.7–76)
NRBC BLD AUTO-RTO: 0 /100 WBC (ref 0–0.2)
PHOSPHATE SERPL-MCNC: 3 MG/DL (ref 2.5–4.5)
PLATELET # BLD AUTO: 355 10*3/MM3 (ref 140–450)
PMV BLD AUTO: 8.7 FL (ref 6–12)
POTASSIUM SERPL-SCNC: 4.4 MMOL/L (ref 3.5–5.2)
RBC # BLD AUTO: 4.08 10*6/MM3 (ref 4.14–5.8)
SODIUM SERPL-SCNC: 127 MMOL/L (ref 136–145)
SODIUM SERPL-SCNC: 128 MMOL/L (ref 136–145)
WBC NRBC COR # BLD AUTO: 4.78 10*3/MM3 (ref 3.4–10.8)

## 2025-05-22 PROCEDURE — 84295 ASSAY OF SERUM SODIUM: CPT | Performed by: INTERNAL MEDICINE

## 2025-05-22 PROCEDURE — 83735 ASSAY OF MAGNESIUM: CPT

## 2025-05-22 PROCEDURE — 99221 1ST HOSP IP/OBS SF/LOW 40: CPT | Performed by: NURSE PRACTITIONER

## 2025-05-22 PROCEDURE — 97530 THERAPEUTIC ACTIVITIES: CPT

## 2025-05-22 PROCEDURE — 80048 BASIC METABOLIC PNL TOTAL CA: CPT | Performed by: HOSPITALIST

## 2025-05-22 PROCEDURE — 97162 PT EVAL MOD COMPLEX 30 MIN: CPT

## 2025-05-22 PROCEDURE — 85025 COMPLETE CBC W/AUTO DIFF WBC: CPT | Performed by: HOSPITALIST

## 2025-05-22 PROCEDURE — 84100 ASSAY OF PHOSPHORUS: CPT

## 2025-05-22 RX ORDER — SODIUM CHLORIDE 1 G/1
1 TABLET ORAL 2 TIMES DAILY
Qty: 60 TABLET | Refills: 0 | Status: SHIPPED | OUTPATIENT
Start: 2025-05-22

## 2025-05-22 RX ADMIN — METOPROLOL SUCCINATE 25 MG: 25 TABLET, EXTENDED RELEASE ORAL at 08:53

## 2025-05-22 RX ADMIN — Medication 1 CAPSULE: at 08:53

## 2025-05-22 RX ADMIN — Medication 10 ML: at 08:53

## 2025-05-22 RX ADMIN — Medication 15 G: at 08:52

## 2025-05-22 RX ADMIN — FAMOTIDINE 40 MG: 20 TABLET, FILM COATED ORAL at 08:52

## 2025-05-22 RX ADMIN — ACETAMINOPHEN 650 MG: 325 TABLET, FILM COATED ORAL at 05:19

## 2025-05-22 RX ADMIN — TIMOLOL MALEATE 1 DROP: 5 SOLUTION OPHTHALMIC at 08:53

## 2025-05-22 RX ADMIN — APIXABAN 5 MG: 5 TABLET, FILM COATED ORAL at 08:53

## 2025-05-22 NOTE — OUTREACH NOTE
Prep Survey      Flowsheet Row Responses   [facility] facility patient discharged from? [place]   Is LACE score < 7 ? No   Eligibility [facility]   Date of Admission 05/20/25   Date of Discharge 05/22/25   Discharge Disposition Home or Self Care   Discharge diagnosis Hyponatremia   Does the patient have one of the following disease processes/diagnoses(primary or secondary)? Other   Does the patient have Home health ordered? No   Is there a DME ordered? No   Prep survey completed? Yes            KENDALL VILLALOBOS - Registered Nurse

## 2025-05-22 NOTE — PLAN OF CARE
"Goal Outcome Evaluation:  Plan of Care Reviewed With: patient, spouse           Outcome Evaluation: Pt seen for PT ally this afternoon. He is a 89yom admitted for hyponatremia and some back pain. Pt with hx of \"very slight compression deformity of T12\" from earlier in the year. PMH includes celiac disease, prostate CA, glaucoma, neuropathy, and squamous cell skin cancer. Pt lives with wife in [facility] with 1STE. PLOF IND with I/ADLs and drives. Pt uses a cane at baseline for community mobility if needed. Per KAITLIN, pt to wear LSO brace for 8 weeks. Today, pt doing well and denies pain at this time. He is able to perform all bed mobility w SBA. He stood w SBA/CGA and then ambulated around unit without AD. Pt w no unsteadiness noted. Pt educated pt/spouse on donning LSO brace. They verbalize understanding. Pt tolerating mobility well at this time. He returned to bed at end of session. Pt is hopeful to DC home soon. No further acute PT needs at this time. Pt plans home w spouse and OP PT.  Will sign off.    Anticipated Discharge Disposition (PT): home, home with assist, home with outpatient therapy services                        "

## 2025-05-22 NOTE — THERAPY EVALUATION
Patient Name: Steffen Leyva  : 1935    MRN: [id number]                              Today's Date: 2025       Admit Date: 2025    Visit Dx:     ICD-10-CM ICD-9-CM   1. Degeneration of intervertebral disc of lumbar region with discogenic back pain  M51.360 722.52   2. Compression fracture of T12 vertebra, initial encounter  S22.080A 805.2     Patient Active Problem List   Diagnosis    Arthritis    Abnormal prostate specific antigen    Borderline diabetes    Chronic anterior uveitis    Functional diarrhea    Adenomatous polyp of ascending colon    Celiac disease    Malignant neoplasm of prostate    Primary open angle glaucoma (POAG)    Squamous cell carcinoma of skin    Paroxysmal atrial fibrillation    SHELTON (obstructive sleep apnea)    Long COVID    Hyponatremia    Back pain    Moderate protein-calorie malnutrition    T12 compression fracture     Past Medical History:   Diagnosis Date    Arthritis     Atrial fibrillation     Cataract     Corneal abrasion, left 2015    SEEN AT [facility] ER    COVID-19 2023    Elevated PSA 2015    Glaucoma     Hemorrhoids     Hypertension     IGT (impaired glucose tolerance) 2015    Prostate cancer 2019     Past Surgical History:   Procedure Laterality Date    COLONOSCOPY N/A     NO RECORDS, OLD CHART NOT AVAILABLE, DR. DAHIANA CURTIS    COLONOSCOPY N/A 2005    RIGHT COLON LESION, PATH: TUBULAR ADENOMA, SIGMOID DIVERTICULOSIS, DR. JENNY XIE AT [facility]    COLONOSCOPY N/A 2018    Procedure: COLONOSCOPY to cecum with hot snare polypectomy with resolution clip x 1, and spot tattoo marker;  Surgeon: Cathi Coto MD;  Location: [facility] ENDOSCOPY;  Service: Gastroenterology    COLONOSCOPY N/A 07/10/2020    Procedure: COLONOSCOPY INTO CECUM AND NORMAL TI WITH BX;  Surgeon: Jose Francisco Rodriguez MD;  Location: [facility] ENDOSCOPY;  Service: Gastroenterology;  Laterality: N/A;  PRE: DIARRHEA   POST: DIVERTICULOSIS, INK AND SCAR-POST POLYPECTOMY SITE,  HEMORRHOIDS     ENDOSCOPY N/A 07/10/2020    Procedure: ESOPHAGOGASTRODUODENOSCOPY WITH BX;  Surgeon: Jose Francisco Rodriguez MD;  Location: [facility] ENDOSCOPY;  Service: Gastroenterology;  Laterality: N/A;  PRE: POSITIVE CELIAC ANTIBODIES   POST: NORMAL    EYE SURGERY Bilateral 2012    RELIEF OF ELEVATED INTRAOCULAR PRESSURE, PERFORMED IN [facility]    HEMORRHOID BANDING N/A 01/19/2006    DR. DAHIANA CURTIS    HEMORRHOID BANDING N/A 1980    NO RECORD AVAILABLE    KNEE ARTHROPLASTY Right 2013    D/T MENISCUS TEAR, DR. SANTACRUZ IN PT [place], [place]    KNEE ARTHROSCOPY Left 1980    REPLACEMENT TOTAL KNEE      TOTAL KNEE ARTHROPLASTY Right 03/2023      General Information       Row Name 05/22/25 1449          Physical Therapy Time and Intention    Document Type evaluation;discharge evaluation/summary  -EJ     Mode of Treatment physical therapy  -EJ       Row Name 05/22/25 1449          General Information    Patient Profile Reviewed yes  -EJ     Prior Level of Function independent:;all household mobility;community mobility;ADL's  -EJ     Existing Precautions/Restrictions brace worn when out of bed;LSO  LSO brace for comfort  -EJ     Barriers to Rehab none identified  -EJ       Row Name 05/22/25 1449          Living Environment    Current Living Arrangements home  -EJ     People in Home spouse  -EJ       Row Name 05/22/25 1449          Cognition    Orientation Status (Cognition) oriented x 4  -EJ               User Key  (r) = Recorded By, (t) = Taken By, (c) = Cosigned By      Initials Name Provider Type    EJ Nguyen Beard, PT Physical Therapist                   Mobility       Row Name 05/22/25 1450          Bed Mobility    Bed Mobility sit-supine  -EJ     Supine-Sit [place] (Bed Mobility) standby assist  -EJ     Sit-Supine [place] (Bed Mobility) standby assist  -EJ     Assistive Device (Bed Mobility) head of bed elevated  -EJ       Row Name 05/22/25 1450          Sit-Stand Transfer    Sit-Stand  "[place] (Transfers) contact guard;standby assist  -EJ     Comment, (Sit-Stand Transfer) assisted pt in donmelly LSO brace and educated pt and spouse  -       Row Name 05/22/25 1450          Gait/Stairs (Locomotion)    [place] Level (Gait) contact guard  -EJ     Distance in Feet (Gait) 150  -EJ     Deviations/Abnormal Patterns (Gait) crissy decreased  -EJ     Bilateral Gait Deviations forward flexed posture  -EJ     Comment, (Gait/Stairs) no unsteadiness noted  -EJ               User Key  (r) = Recorded By, (t) = Taken By, (c) = Cosigned By      Initials Name Provider Type    Nguyen Gonzalez, PT Physical Therapist                   Obj/Interventions       Row Name 05/22/25 1451          Range of Motion Comprehensive    General Range of Motion no range of motion deficits identified  -[facility] Name 05/22/25 1451          Strength Comprehensive (MMT)    General Manual Muscle Testing (MMT) Assessment no strength deficits identified  -[facility] Name 05/22/25 1451          Balance    Static Sitting Balance supervision  -EJ     Position, Sitting Balance sitting edge of bed  -EJ     Static Standing Balance standby assist  -EJ     Dynamic Standing Balance standby assist;contact guard  -EJ               User Key  (r) = Recorded By, (t) = Taken By, (c) = Cosigned By      Initials Name Provider Type    Nguyen Gonzalez, PT Physical Therapist                   Goals/Plan    No documentation.                  Clinical Impression       Row Name 05/22/25 1451          Pain    Pretreatment Pain Rating 0/10 - no pain  -EJ     Posttreatment Pain Rating 0/10 - no pain  -EJ       [facility] Name 05/22/25 1451          Plan of Care Review    Plan of Care Reviewed With patient;spouse  -     Outcome Evaluation Pt seen for PT ally this afternoon. He is a 89yom admitted for hyponatremia and some back pain. Pt with hx of \"very slight compression deformity of T12\" from earlier in the year. PMH includes celiac disease, " prostate CA, glaucoma, neuropathy, and squamous cell skin cancer. Pt lives with wife in [facility] with 1STE. PLOF IND with I/ADLs and drives. Pt uses a cane at baseline for community mobility if needed. Per KAITLIN, pt to wear LSO brace for 8 weeks. Today, pt doing well and denies pain at this time. He is able to perform all bed mobility w SBA. He stood w SBA/CGA and then ambulated around unit without AD. Pt w no unsteadiness noted. Pt educated pt/spouse on donning LSO brace. They verbalize understanding. Pt tolerating mobility well at this time. He returned to bed at end of session. Pt is hopeful to DC home soon. No further acute PT needs at this time. Pt plans home w spouse and OP PT.  Will sign off.  -       Row Name 05/22/25 1451          Therapy Assessment/Plan (PT)    Criteria for Skilled Interventions Met (PT) no problems identified which require skilled intervention  -EJ     Therapy Frequency (PT) evaluation only  -       Row Name 05/22/25 1451          Positioning and Restraints    Pre-Treatment Position in bed  -EJ     Post Treatment Position bed  -EJ     In Bed notified nsg;supine;call light within reach;encouraged to call for assist;exit alarm on;with family/caregiver  -EJ               User Key  (r) = Recorded By, (t) = Taken By, (c) = Cosigned By      Initials Name Provider Type    Nguyen Gonzalez, PT Physical Therapist                   Outcome Measures       Row Name 05/22/25 1454 05/22/25 1343       How much help from another person do you currently need...    Turning from your back to your side while in flat bed without using bedrails? 4  -EJ 4  -KS    Moving from lying on back to sitting on the side of a flat bed without bedrails? 4  -EJ 4  -KS    Moving to and from a bed to a chair (including a wheelchair)? 4  -EJ 3  -KS    Standing up from a chair using your arms (e.g., wheelchair, bedside chair)? 4  -EJ 3  -KS    Climbing 3-5 steps with a railing? 3  -EJ 2  -KS    To walk in hospital room? 4   "-EJ 3  -KS    AM-PAC 6 Clicks Score (PT) 23  -EJ 19  -KS              User Key  (r) = Recorded By, (t) = Taken By, (c) = Cosigned By      Initials Name Provider Type    Nguyen Gonzalez, PT Physical Therapist    Nathalia Krause, RN Registered Nurse                                   PT Recommendation and Plan     Outcome Evaluation: Pt seen for PT eval this afternoon. He is a 89yom admitted for hyponatremia and some back pain. Pt with hx of \"very slight compression deformity of T12\" from earlier in the year. PMH includes celiac disease, prostate CA, glaucoma, neuropathy, and squamous cell skin cancer. Pt lives with wife in [facility] with 1STE. PLOF IND with I/ADLs and drives. Pt uses a cane at baseline for community mobility if needed. Per KAITLIN, pt to wear LSO brace for 8 weeks. Today, pt doing well and denies pain at this time. He is able to perform all bed mobility w SBA. He stood w SBA/CGA and then ambulated around unit without AD. Pt w no unsteadiness noted. Pt educated pt/spouse on donning LSO brace. They verbalize understanding. Pt tolerating mobility well at this time. He returned to bed at end of session. Pt is hopeful to DC home soon. No further acute PT needs at this time. Pt plans home w spouse and OP PT.  Will sign off.     Time Calculation:         PT Charges       Row Name 05/22/25 1455             Time Calculation    Start Time 1310  -EJ      Stop Time 1333  -EJ      Time Calculation (min) 23 min  -EJ      PT Received On 05/22/25  -EJ         Time Calculation- PT    Total Timed Code Minutes- PT 18 minute(s)  -EJ                User Key  (r) = Recorded By, (t) = Taken By, (c) = Cosigned By      Initials Name Provider Type    Nguyen Gonzalez, PT Physical Therapist                  Therapy Charges for Today       Code Description Service Date Service Provider Modifiers Qty    [id number]  PT THERAPEUTIC ACT EA 15 MIN 5/22/2025 Nguyen Beard, PT GP 1    70934494817 HC PT EVAL MOD COMPLEXITY 3 " 5/22/2025 Nguyen Beard, PT GP 1            PT G-Codes  Outcome Measure Options: AM-PAC 6 Clicks Daily Activity (OT)  AM-PAC 6 Clicks Score (PT): 23  AM-PAC 6 Clicks Score (OT): 22  PT Discharge Summary  Anticipated Discharge Disposition (PT): home, home with assist, home with outpatient therapy services    Nguyen Beard, PT  5/22/2025

## 2025-05-22 NOTE — PLAN OF CARE
Goal Outcome Evaluation:  Plan of Care Reviewed With: patient          Problem: Adult Inpatient Plan of Care  Goal: Plan of Care Review  Outcome: Progressing  Flowsheets (Taken 5/22/2025 0602)  Progress: improving  Outcome Evaluation: Vitals stable. Pain controled with Tylenol. CPAP while sleeping. Ambulated to BR. +BM. Urinal at bedside. 1500 fluid restriction. Slept throughout the night. Plan of care ongoing.  Plan of Care Reviewed With: patient

## 2025-05-22 NOTE — PROGRESS NOTES
[facility] Progress Note      Patient Name: Steffen Leyva  : 1935  MRN: [id number]  Primary Care Physician:  Bony Zhang DO  Date of admission: 2025    Subjective     Interval History:   F/u hypoNa    Review of Systems:   Back pain better  Anxious to discharge    Objective     Vitals:   Temp:  [97.5 °F (36.4 °C)-98.6 °F (37 °C)] 97.5 °F (36.4 °C)  Heart Rate:  [51-56] 51  Resp:  [18-20] 18  BP: (127-167)/(58-71) 146/58    Intake/Output Summary (Last 24 hours) at 2025 0702  Last data filed at 2025 0502  Gross per 24 hour   Intake 0 ml   Output 1625 ml   Net -1625 ml       Physical Exam:    General Appearance: frail WM no distress   Neck: supple, no JVD  Lungs: CTA bilat no rales  Heart: RRR, normal S1 and S2  Abdomen: soft, nontender, nondistended  Extremities: no edema, cyanosis or clubbing      Scheduled Meds:     apixaban, 5 mg, Oral, BID  famotidine, 40 mg, Oral, Daily  lactobacillus acidophilus, 1 capsule, Oral, Daily  metoprolol succinate XL, 25 mg, Oral, Daily  sodium chloride, 10 mL, Intravenous, Q12H  timolol, 1 drop, Both Eyes, Daily      IV Meds:        Results Reviewed:   I have personally reviewed the results from the time of this admission to 2025 07:02 EDT     Results from last 7 days   Lab Units 25  0512 25  0642 25  0841 25  1531   SODIUM mmol/L 128* 130* 125*   < > 127*   POTASSIUM mmol/L 4.4 4.1 4.6   < > 4.9   CHLORIDE mmol/L 96* 97* 93*   < > 91*   CO2 mmol/L 22.0 24.0 23.0   < > 25.1   BUN mg/dL 11 12 18   < > 20   CREATININE mg/dL 0.69* 0.70* 0.77   < > 0.98   CALCIUM mg/dL 8.8 8.7 8.7   < > 9.2   BILIRUBIN mg/dL  --   --  0.2  --  0.3   ALK PHOS U/L  --   --  101  --  114   ALT (SGPT) U/L  --   --  8  --  12   AST (SGOT) U/L  --   --  16  --  20   GLUCOSE mg/dL 84 89 97   < > 122*    < > = values in this interval not displayed.     Estimated Creatinine Clearance: 68.1 mL/min (A) (by C-G formula  based on SCr of 0.69 mg/dL (L)).  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  Also low solute intake.  Urine studies and low uric acid (3) point toward element of SIADH: Uosm 319, [place] 65).  Na initially improved to 130 with IVF, down slightly 128 today.  Euvolemic.  Paroxysmal A-fib, in NSR, on AC  HTN, BP labile, on toprol XL  Low back pain, MRI lumbar/thoracic spine noted; has acute T12 compression fx  Alpha gal syndrome, dietitian consult  Hx prostate cancer    PLAN:  Add urea packets   Continue FR 1500 cc/day  Recheck Na at noon - if stable no objection to discharge on salt tabs 1g BID   MRI noted - neurosurg to see  D/W RN and son       Tra Fernando Corrigan MD  05/22/25  07:02 EDT    [facility]  [phone]

## 2025-05-22 NOTE — CONSULTS
[facility]   Consult Note    Patient Name: Steffen Leyva  : 1935  MRN: [id number]  Primary Care Physician:  Bony Zhang DO  Referring Physician: Joshua Donahue MD  Date of admission: 2025      Subjective        Reason for Consult/ Chief Complaint: Back pain     History of Present Illness  Steffen Leyva is a 89 y.o. male admitted for hyponatremia then upon further evaluation had c/o back pain which started approximately 1 month ago after lifting a trash can up and over his head. He did not have any associated radiculopathy or myelopathy. His back pain symptoms have been improving and managed with Acetaminophen.         Personal History     Past Medical History:   Diagnosis Date    Arthritis     Atrial fibrillation     Cataract     Corneal abrasion, left 2015    SEEN AT [facility]    COVID-19 2023    Elevated PSA 2015    Glaucoma     Hemorrhoids     Hypertension     IGT (impaired glucose tolerance) 2015    Prostate cancer 2019       Past Surgical History:   Procedure Laterality Date    COLONOSCOPY N/A     NO RECORDS, OLD CHART NOT AVAILABLE, DR. DAHIANA CURTIS    COLONOSCOPY N/A 2005    RIGHT COLON LESION, PATH: TUBULAR ADENOMA, SIGMOID DIVERTICULOSIS, DR. JENNY XIE AT [facility]    COLONOSCOPY N/A 2018    Procedure: COLONOSCOPY to cecum with hot snare polypectomy with resolution clip x 1, and spot tattoo marker;  Surgeon: Cathi Coto MD;  Location: [facility] ENDOSCOPY;  Service: Gastroenterology    COLONOSCOPY N/A 07/10/2020    Procedure: COLONOSCOPY INTO CECUM AND NORMAL TI WITH BX;  Surgeon: Jose Francisco Rodriguez MD;  Location: [facility] ENDOSCOPY;  Service: Gastroenterology;  Laterality: N/A;  PRE: DIARRHEA   POST: DIVERTICULOSIS, INK AND SCAR-POST POLYPECTOMY SITE, HEMORRHOIDS     ENDOSCOPY N/A 07/10/2020    Procedure: ESOPHAGOGASTRODUODENOSCOPY WITH BX;  Surgeon: Jose Francisco Rodriguez MD;  Location: [facility] ENDOSCOPY;  Service: Gastroenterology;  Laterality: N/A;  PRE:  POSITIVE CELIAC ANTIBODIES   POST: NORMAL    EYE SURGERY Bilateral 2012    RELIEF OF ELEVATED INTRAOCULAR PRESSURE, PERFORMED IN [facility]    HEMORRHOID BANDING N/A 01/19/2006    DR. DAHIANA CURTIS    HEMORRHOID BANDING N/A 1980    NO RECORD AVAILABLE    KNEE ARTHROPLASTY Right 2013    D/T MENISCUS TEAR, DR. SANTACRUZ IN PT [place], [place]    KNEE ARTHROSCOPY Left 1980    REPLACEMENT TOTAL KNEE      TOTAL KNEE ARTHROPLASTY Right 03/2023       Family History: family history includes Alcohol abuse in his brother; Aneurysm in his mother; Aortic aneurysm in his mother; Glaucoma in his mother; Heart disease (age of onset: 60) in his brother; Heart disease (age of onset: 80) in his father; No Known Problems in his brother. Otherwise pertinent FHx was reviewed and not pertinent to current issue.    Social History:  reports that he quit smoking about 39 years ago. His smoking use included cigarettes. He started smoking about 68 years ago. He has a 29 pack-year smoking history. He has never used smokeless tobacco. He reports current alcohol use of about 7.0 standard drinks of alcohol per week. He reports that he does not use drugs.    Home Medications:   EPINEPHrine, Glucosamine Chond Cmp Advanced, Probiotic Product, apixaban, metoprolol succinate XL, and timolol    Allergies:  Allergies   Allergen Reactions    Prednisolone Unknown - High Severity     High Eye pressure    Oxycodone-Acetaminophen Nausea And Vomiting       Objective    Objective     Vitals:  Temp:  [97.5 °F (36.4 °C)-98.6 °F (37 °C)] 97.5 °F (36.4 °C)  Heart Rate:  [51-62] 62  Resp:  [18-20] 18  BP: (127-164)/(58-91) 164/91    Physical Exam:   Constitutional: No fever, chills  MS: No back pain  Neuro: No numbness, tingling, or weakness,  no balance difficulties  : No difficulty voiding, no incontinence      Result Review    Result Review:  I have personally reviewed the results from the time of this admission to 5/22/2025 11:34 EDT and agree with  these findings:  [x]  Laboratory list / accordion  [x]  Radiology    Most notable findings include:   MRI Thoracic Spine without contrast 5/21/25:  T12 compression fracture with about 40% height loss and slight posterior  cortical flattening, but no overall canal stenosis. FINDINGS most  consistent with a benign osteoporotic compression fracture.     No additional fracture is seen. No cord compression or abnormal cord  signal.        Assessment & Plan   Assessment / Plan     Lower Back Pain      Plan:   Thoracic and Lumbar MRI reviewed as above. Patient seen and examined, denies pain and states well controlled with tylenol. No myelopathy or radiculopathy. Recommending brace for 8 weeks and will plan for f/u as Outpatient in 8 weeks with repeat Xrays, discussed with patient and spouse at bedside, all questions answered.       Radha Patel, APRN     no

## 2025-05-22 NOTE — DISCHARGE SUMMARY
PHYSICIAN DISCHARGE SUMMARY                                                                        [facility]    Patient Identification:  Name: Steffen Leyva  Age: 89 y.o.  Sex: male  :  1935  MRN: [id number]  Primary Care Physician: Bony Zhang DO    Admit date: 2025  Discharge date and time:2025  Discharged Condition: good    Discharge Diagnoses:  Active Hospital Problems    Diagnosis  POA    **Hyponatremia [E87.1]  Unknown    T12 compression fracture [S22.080A]  Unknown    Back pain [M54.9]  Unknown    Moderate protein-calorie malnutrition [E44.0]  Yes    SHELTON (obstructive sleep apnea) [G47.33]  Yes    Paroxysmal atrial fibrillation [I48.0]  Yes    Malignant neoplasm of prostate [C61]  Yes    Celiac disease [K90.0]  Yes    Arthritis [M19.90]  Yes      Resolved Hospital Problems   No resolved problems to display.          PMHX:   Past Medical History:   Diagnosis Date    Arthritis     Atrial fibrillation     Cataract     Corneal abrasion, left 2015    SEEN AT [facility] ER    COVID-19 2023    Elevated PSA 2015    Glaucoma     Hemorrhoids     Hypertension     IGT (impaired glucose tolerance) 2015    Prostate cancer 2019     PSHX:   Past Surgical History:   Procedure Laterality Date    COLONOSCOPY N/A     NO RECORDS, OLD CHART NOT AVAILABLE, DR. DAHIANA CURTIS    COLONOSCOPY N/A 2005    RIGHT COLON LESION, PATH: TUBULAR ADENOMA, SIGMOID DIVERTICULOSIS, DR. JENNY XIE AT [facility]    COLONOSCOPY N/A 2018    Procedure: COLONOSCOPY to cecum with hot snare polypectomy with resolution clip x 1, and spot tattoo marker;  Surgeon: Cathi Coto MD;  Location: [facility] ENDOSCOPY;  Service: Gastroenterology    COLONOSCOPY N/A 07/10/2020    Procedure: COLONOSCOPY INTO CECUM AND NORMAL TI WITH BX;  Surgeon: Jose Francisco Rodriguez MD;  Location: [facility] ENDOSCOPY;  Service: Gastroenterology;  Laterality: N/A;   PRE: DIARRHEA   POST: DIVERTICULOSIS, INK AND SCAR-POST POLYPECTOMY SITE, HEMORRHOIDS     ENDOSCOPY N/A 07/10/2020    Procedure: ESOPHAGOGASTRODUODENOSCOPY WITH BX;  Surgeon: Jose Francisco Rodriguez MD;  Location: [facility] ENDOSCOPY;  Service: Gastroenterology;  Laterality: N/A;  PRE: POSITIVE CELIAC ANTIBODIES   POST: NORMAL    EYE SURGERY Bilateral 2012    RELIEF OF ELEVATED INTRAOCULAR PRESSURE, PERFORMED IN [facility]    HEMORRHOID BANDING N/A 01/19/2006    DR. DAHIANA CURTIS    HEMORRHOID BANDING N/A 1980    NO RECORD AVAILABLE    KNEE ARTHROPLASTY Right 2013    D/T MENISCUS TEAR, DR. SANTACRUZ IN PT [place], [place]    KNEE ARTHROSCOPY Left 1980    REPLACEMENT TOTAL KNEE      TOTAL KNEE ARTHROPLASTY Right 03/2023       Hospital Course: Steffen Leyva the patient is a 89-year-old male who was referred in for direct admission when it was noted that he had a low sodium of 126 on outpatient lab studies.  The patient also had been complaining of some back pain and has history of having probable compression fracture at T12.  This occurred approximately 3 weeks prior to admission.  The patient was admitted to the hospital and seen by neurosurgery and nephrology.  His sodium was improving and he was put on a fluid restriction and salt tablets.  Neurosurgery saw the patient and recommended back brace and they will see him for follow-up with x-rays in several weeks.  He will follow-up with his primary care in 1 week for ongoing care and also will help follow-up with nephrology next week to follow-up on his low sodium.  He was encouraged to stay with fluid restriction.    Consults:     Consults       Date and Time Order Name Status Description    5/21/2025 11:39 AM Inpatient Neurosurgery Consult      5/21/2025 11:39 AM Inpatient Nephrology Consult Completed           Results from last 7 days   Lab Units 05/22/25  0512   WBC 10*3/mm3 4.78   HEMOGLOBIN g/dL 12.8*   HEMATOCRIT % 38.1   PLATELETS 10*3/mm3 355     Results from  "last 7 days   Lab Units 05/22/25  1152 05/22/25  0512   SODIUM mmol/L 127* 128*   POTASSIUM mmol/L  --  4.4   CHLORIDE mmol/L  --  96*   CO2 mmol/L  --  22.0   BUN mg/dL  --  11   CREATININE mg/dL  --  0.69*   GLUCOSE mg/dL  --  84   CALCIUM mg/dL  --  8.8     Significant Diagnostic Studies:   WBC   Date Value Ref Range Status   05/22/2025 4.78 3.40 - 10.80 10*3/mm3 Final     Hemoglobin   Date Value Ref Range Status   05/22/2025 12.8 (L) 13.0 - 17.7 g/dL Final     Hematocrit   Date Value Ref Range Status   05/22/2025 38.1 37.5 - 51.0 % Final     Platelets   Date Value Ref Range Status   05/22/2025 355 140 - 450 10*3/mm3 Final     Sodium   Date Value Ref Range Status   05/22/2025 127 (L) 136 - 145 mmol/L Final     Potassium   Date Value Ref Range Status   05/22/2025 4.4 3.5 - 5.2 mmol/L Final     Chloride   Date Value Ref Range Status   05/22/2025 96 (L) 98 - 107 mmol/L Final     CO2   Date Value Ref Range Status   05/22/2025 22.0 22.0 - 29.0 mmol/L Final     BUN   Date Value Ref Range Status   05/22/2025 11 8 - 23 mg/dL Final     Creatinine   Date Value Ref Range Status   05/22/2025 0.69 (L) 0.76 - 1.27 mg/dL Final     Glucose   Date Value Ref Range Status   05/22/2025 84 65 - 99 mg/dL Final     Calcium   Date Value Ref Range Status   05/22/2025 8.8 8.2 - 9.6 mg/dL Final     Magnesium   Date Value Ref Range Status   05/22/2025 2.4 1.6 - 2.4 mg/dL Final     Phosphorus   Date Value Ref Range Status   05/22/2025 3.0 2.5 - 4.5 mg/dL Final     AST (SGOT)   Date Value Ref Range Status   05/20/2025 16 1 - 40 U/L Final     ALT (SGPT)   Date Value Ref Range Status   05/20/2025 8 1 - 41 U/L Final     Alkaline Phosphatase   Date Value Ref Range Status   05/20/2025 101 39 - 117 U/L Final     No results found for: \"APTT\", \"INR\"  Color, UA   Date Value Ref Range Status   05/20/2025 Yellow Yellow, Straw Final     Appearance, UA   Date Value Ref Range Status   05/20/2025 Clear Clear Final     pH, UA   Date Value Ref Range Status " "  05/20/2025 7.0 5.0 - 8.0 Final     Glucose, UA   Date Value Ref Range Status   05/20/2025 Negative Negative Final     Ketones, UA   Date Value Ref Range Status   05/20/2025 Negative Negative Final     Blood, UA   Date Value Ref Range Status   05/20/2025 Small (1+) (A) Negative Final     Leuk Esterase, UA   Date Value Ref Range Status   05/20/2025 Negative Negative Final     Bilirubin, UA   Date Value Ref Range Status   05/20/2025 Negative Negative Final     Urobilinogen, UA   Date Value Ref Range Status   05/20/2025 0.2 E.U./dL 0.2 - 1.0 E.U./dL Final     RBC, UA   Date Value Ref Range Status   05/20/2025 3-5 (A) None Seen, 0-2 /HPF Final     WBC, UA   Date Value Ref Range Status   05/20/2025 0-2 None Seen, 0-2 /HPF Final     Bacteria, UA   Date Value Ref Range Status   05/20/2025 None Seen None Seen /HPF Final     No results found for: \"TROPONINT\", \"TROPONINI\", \"BNP\"  No components found for: \"HGBA1C;2\"  No components found for: \"TSH;2\"  Imaging Results (All)       Procedure Component Value Units Date/Time    MRI Lumbar Spine Without Contrast [[id number]] Collected: 05/22/25 0148     Updated: 05/22/25 0157    Narrative:      LUMBAR SPINE MRI WITHOUT CONTRAST     INDICATION: low back pain; M51.360-Other intervertebral disc  degeneration, lumbar region with discogenic back pain only;  S22.080A-Wedge compression fracture of T11-T12 vertebra, initial  encounter for closed fracture     COMPARISON: None.     TECHNIQUE: Routine lumbar spine MRI without contrast.     FINDINGS:     Lumbar spine alignment is within normal limits. There is a new acute T12  compression fracture, please see thoracic spine MRI report. There is no  lumbar marrow edema. The distal cord and conus are normal in position  and appearance.  The paraspinous soft tissues are unremarkable.     T12-L1: There is no substantial canal or foraminal stenosis.     L1-2: There is a disc bulge and mild canal stenosis, and mild left and  no right foraminal " stenosis.     L2-3: There is a disc bulge and mild canal stenosis, and mild right and  no left foraminal stenosis.     L3-4: There is a disc bulge and facet arthropathy with mild to moderate  canal stenosis, and mild bilateral foraminal stenosis.     L4-5: There is a disc bulge and facet arthropathy with mild to moderate  canal stenosis. There is mild right and moderate left foraminal  stenosis.      L5-S1: There is no canal stenosis and the right foramen is within normal  limits. There is mild left foraminal stenosis.       Impression:         Acute T12 compression fracture, please see thoracic spine MRI report.  Lumbar spine itself demonstrates mild degenerative change, see level by  level details above.        This report was finalized on 5/22/2025 1:54 AM by Dr. Yair Barnes M.D on Workstation: [id number]       MRI Thoracic Spine Without Contrast [[id number]] Collected: 05/22/25 0117     Updated: 05/22/25 0132    Narrative:      THORACIC SPINE MRI WITHOUT CONTRAST     INDICATION: back pain; M51.360-Other intervertebral disc degeneration,  lumbar region with discogenic back pain only; S22.080A-Wedge compression  fracture of T11-T12 vertebra, initial encounter for closed fracture     COMPARISON: None.     TECHNIQUE: Routine thoracic spine MRI without contrast.     FINDINGS:     Spine alignment is within normal limits. There is a T12 compression  fracture with about 40% vertebral body height loss and moderate marrow  edema. There is slight posterior cortical flattening/retropulsion, with  negligible central canal narrowing.     There is no marrow edema at any other level and no other compression  deformity is seen. There is no cord compression or displacement, and  there is no abnormal cord signal.     The paraspinous soft tissues are unremarkable.       Impression:         T12 compression fracture with about 40% height loss and slight posterior  cortical flattening, but no overall canal stenosis. FINDINGS  most  consistent with a benign osteoporotic compression fracture.     No additional fracture is seen. No cord compression or abnormal cord  signal.        This report was finalized on 5/22/2025 1:29 AM by Dr. Yair Barnes M.D on Workstation: [id number]             Lab Results (last 7 days)       Procedure Component Value Units Date/Time    Sodium [[id number]]  (Abnormal) Collected: 05/22/25 1152    Specimen: Blood Updated: 05/22/25 1302     Sodium 127 mmol/L     Magnesium [[id number]]  (Normal) Collected: 05/22/25 0512    Specimen: Blood Updated: 05/22/25 0700     Magnesium 2.4 mg/dL     Phosphorus [[id number]]  (Normal) Collected: 05/22/25 0512    Specimen: Blood Updated: 05/22/25 0700     Phosphorus 3.0 mg/dL     Basic Metabolic Panel [[id number]]  (Abnormal) Collected: 05/22/25 0512    Specimen: Blood Updated: 05/22/25 0700     Glucose 84 mg/dL      BUN 11 mg/dL      Creatinine 0.69 mg/dL      Sodium 128 mmol/L      Potassium 4.4 mmol/L      Chloride 96 mmol/L      CO2 22.0 mmol/L      Calcium 8.8 mg/dL      BUN/Creatinine Ratio 15.9     Anion Gap 10.0 mmol/L      eGFR 87.9 mL/min/1.73     Narrative:      GFR Categories in Chronic Kidney Disease (CKD)              GFR Category          GFR (mL/min/1.73)    Interpretation  G1                    90 or greater        Normal or high (1)  G2                    60-89                Mild decrease (1)  G3a                   45-59                Mild to moderate decrease  G3b                   30-44                Moderate to severe decrease  G4                    15-29                Severe decrease  G5                    14 or less           Kidney failure    (1)In the absence of evidence of kidney disease, neither GFR category G1 or G2 fulfill the criteria for CKD.    eGFR calculation 2021 CKD-EPI creatinine equation, which does not include race as a factor    CBC & Differential [[id number]]  (Abnormal) Collected: 05/22/25 0512    Specimen: Blood Updated: 05/22/25  0640    Narrative:      The following orders were created for panel order CBC & Differential.  Procedure                               Abnormality         Status                     ---------                               -----------         ------                     CBC Auto Differential[[id number]]        Abnormal            Final result                 Please view results for these tests on the individual orders.    CBC Auto Differential [[id number]]  (Abnormal) Collected: 05/22/25 0512    Specimen: Blood Updated: 05/22/25 0640     WBC 4.78 10*3/mm3      RBC 4.08 10*6/mm3      Hemoglobin 12.8 g/dL      Hematocrit 38.1 %      MCV 93.4 fL      MCH 31.4 pg      MCHC 33.6 g/dL      RDW 12.1 %      RDW-SD 41.8 fl      MPV 8.7 fL      Platelets 355 10*3/mm3      Neutrophil % 45.9 %      Lymphocyte % 33.7 %      Monocyte % 12.6 %      Eosinophil % 5.9 %      Basophil % 1.5 %      Immature Grans % 0.4 %      Neutrophils, Absolute 2.20 10*3/mm3      Lymphocytes, Absolute 1.61 10*3/mm3      Monocytes, Absolute 0.60 10*3/mm3      Eosinophils, Absolute 0.28 10*3/mm3      Basophils, Absolute 0.07 10*3/mm3      Immature Grans, Absolute 0.02 10*3/mm3      nRBC 0.0 /100 WBC     Uric Acid [[id number]]  (Abnormal) Collected: 05/21/25 0642    Specimen: Blood Updated: 05/21/25 1408     Uric Acid 3.0 mg/dL     Folate [[id number]]  (Normal) Collected: 05/21/25 0642    Specimen: Blood Updated: 05/21/25 0902     Folate 19.20 ng/mL     Narrative:      Results may be falsely increased if patient taking Biotin.      Vitamin B12 [[id number]]  (Normal) Collected: 05/21/25 0642    Specimen: Blood Updated: 05/21/25 0800     Vitamin B-12 795 pg/mL     Narrative:      Results may be falsely increased if patient taking Biotin.      Cortisol [[id number]] Collected: 05/21/25 0642    Specimen: Blood Updated: 05/21/25 0749     Cortisol 10.90 mcg/dL     Narrative:      Cortisol Reference Ranges:    Cortisol 6AM - 10AM Range: 6.02-18.40 mcg/dl  Cortisol 4PM  - 8PM Range: 2.68-10.50 mcg/dl      Results may be falsely increased if patient taking Biotin.      Basic Metabolic Panel [[id number]]  (Abnormal) Collected: 05/21/25 0642    Specimen: Blood Updated: 05/21/25 0745     Glucose 89 mg/dL      BUN 12 mg/dL      Creatinine 0.70 mg/dL      Sodium 130 mmol/L      Potassium 4.1 mmol/L      Chloride 97 mmol/L      CO2 24.0 mmol/L      Calcium 8.7 mg/dL      BUN/Creatinine Ratio 17.1     Anion Gap 9.0 mmol/L      eGFR 87.5 mL/min/1.73     Narrative:      GFR Categories in Chronic Kidney Disease (CKD)              GFR Category          GFR (mL/min/1.73)    Interpretation  G1                    90 or greater        Normal or high (1)  G2                    60-89                Mild decrease (1)  G3a                   45-59                Mild to moderate decrease  G3b                   30-44                Moderate to severe decrease  G4                    15-29                Severe decrease  G5                    14 or less           Kidney failure    (1)In the absence of evidence of kidney disease, neither GFR category G1 or G2 fulfill the criteria for CKD.    eGFR calculation 2021 CKD-EPI creatinine equation, which does not include race as a factor    Phosphorus [[id number]]  (Normal) Collected: 05/21/25 0642    Specimen: Blood Updated: 05/21/25 0745     Phosphorus 3.1 mg/dL     Magnesium [[id number]]  (Normal) Collected: 05/21/25 0642    Specimen: Blood Updated: 05/21/25 0745     Magnesium 2.1 mg/dL     Iron Profile [[id number]]  (Normal) Collected: 05/21/25 0642    Specimen: Blood Updated: 05/21/25 0745     Iron 62 mcg/dL      Iron Saturation (TSAT) 21 %      Transferrin 201 mg/dL      TIBC 299 mcg/dL     CBC Auto Differential [[id number]]  (Abnormal) Collected: 05/21/25 0642    Specimen: Blood Updated: 05/21/25 0724     WBC 4.10 10*3/mm3      RBC 3.73 10*6/mm3      Hemoglobin 11.9 g/dL      Hematocrit 36.3 %      MCV 97.3 fL      MCH 31.9 pg      MCHC 32.8 g/dL      RDW 12.5  %      RDW-SD 44.5 fl      MPV 8.7 fL      Platelets 286 10*3/mm3      Neutrophil % 46.4 %      Lymphocyte % 31.7 %      Monocyte % 14.1 %      Eosinophil % 6.6 %      Basophil % 1.0 %      Immature Grans % 0.2 %      Neutrophils, Absolute 1.90 10*3/mm3      Lymphocytes, Absolute 1.30 10*3/mm3      Monocytes, Absolute 0.58 10*3/mm3      Eosinophils, Absolute 0.27 10*3/mm3      Basophils, Absolute 0.04 10*3/mm3      Immature Grans, Absolute 0.01 10*3/mm3      nRBC 0.0 /100 WBC     Reticulocytes [[id number]]  (Normal) Collected: 05/21/25 0642    Specimen: Blood Updated: 05/21/25 0724     Reticulocyte % 1.31 %      Reticulocyte Absolute 0.0489 10*6/mm3     Calcitriol (1,25 di-OH Vitamin D) [[id number]] Collected: 05/21/25 0642    Specimen: Blood Updated: 05/21/25 0717    Osmolality, Urine - Urine, Clean Catch [[id number]] Collected: 05/20/25 2053    Specimen: Urine, Clean Catch Updated: 05/20/25 2144     Osmolality, Urine 319 mOsm/kg     Narrative:      Osmo Normal Reference Ranges:    Random:  mOsm/kg H2O, depending on fluid intake.  Random: >850 mOsm/kg H20, after 12 hour fluid restriction.    24 Hour: 300-900 mOsm/kg H2O.    Sodium, Urine, Random - Urine, Clean Catch [[id number]] Collected: 05/20/25 2053    Specimen: Urine, Clean Catch Updated: 05/20/25 2123     Sodium, Urine 65 mmol/L     Narrative:      Reference intervals for random urine have not been established.  Clinical usage is dependent upon physician's interpretation in combination with other laboratory tests.       Urinalysis With Microscopic If Indicated (No Culture) - Urine, Clean Catch [[id number]]  (Abnormal) Collected: 05/20/25 2053    Specimen: Urine, Clean Catch Updated: 05/20/25 2120     Color, UA Yellow     Appearance, UA Clear     pH, UA 7.0     Specific Gravity, UA 1.009     Glucose, UA Negative     Ketones, UA Negative     Bilirubin, UA Negative     Blood, UA Small (1+)     Protein, UA Negative     Leuk Esterase, UA Negative     Nitrite, UA  Negative     Urobilinogen, UA 0.2 E.U./dL    Urinalysis, Microscopic Only - Urine, Clean Catch [[id number]]  (Abnormal) Collected: 05/20/25 2053    Specimen: Urine, Clean Catch Updated: 05/20/25 2120     RBC, UA 3-5 /HPF      WBC, UA 0-2 /HPF      Bacteria, UA None Seen /HPF      Squamous Epithelial Cells, UA None Seen /HPF      Hyaline Casts, UA None Seen /LPF      Methodology Automated Microscopy    Comprehensive Metabolic Panel [[id number]]  (Abnormal) Collected: 05/20/25 2032    Specimen: Blood Updated: 05/20/25 2107     Glucose 97 mg/dL      BUN 18 mg/dL      Creatinine 0.77 mg/dL      Sodium 125 mmol/L      Potassium 4.6 mmol/L      Chloride 93 mmol/L      CO2 23.0 mmol/L      Calcium 8.7 mg/dL      Total Protein 6.9 g/dL      Albumin 4.1 g/dL      ALT (SGPT) 8 U/L      AST (SGOT) 16 U/L      Alkaline Phosphatase 101 U/L      Total Bilirubin 0.2 mg/dL      Globulin 2.8 gm/dL      A/G Ratio 1.5 g/dL      BUN/Creatinine Ratio 23.4     Anion Gap 9.0 mmol/L      eGFR 85.0 mL/min/1.73     Narrative:      GFR Categories in Chronic Kidney Disease (CKD)              GFR Category          GFR (mL/min/1.73)    Interpretation  G1                    90 or greater        Normal or high (1)  G2                    60-89                Mild decrease (1)  G3a                   45-59                Mild to moderate decrease  G3b                   30-44                Moderate to severe decrease  G4                    15-29                Severe decrease  G5                    14 or less           Kidney failure    (1)In the absence of evidence of kidney disease, neither GFR category G1 or G2 fulfill the criteria for CKD.    eGFR calculation 2021 CKD-EPI creatinine equation, which does not include race as a factor    CBC & Differential [[id number]]  (Abnormal) Collected: 05/20/25 2032    Specimen: Blood Updated: 05/20/25 2056    Narrative:      The following orders were created for panel order CBC & Differential.  Procedure          "                      Abnormality         Status                     ---------                               -----------         ------                     CBC Auto Differential[[id number]]        Abnormal            Final result                 Please view results for these tests on the individual orders.    CBC Auto Differential [[id number]]  (Abnormal) Collected: 05/20/25 2032    Specimen: Blood Updated: 05/20/25 2056     WBC 4.53 10*3/mm3      RBC 3.77 10*6/mm3      Hemoglobin 12.0 g/dL      Hematocrit 36.4 %      MCV 96.6 fL      MCH 31.8 pg      MCHC 33.0 g/dL      RDW 12.5 %      RDW-SD 44.1 fl      MPV 8.7 fL      Platelets 316 10*3/mm3      Neutrophil % 48.5 %      Lymphocyte % 31.6 %      Monocyte % 12.1 %      Eosinophil % 6.0 %      Basophil % 1.1 %      Immature Grans % 0.7 %      Neutrophils, Absolute 2.20 10*3/mm3      Lymphocytes, Absolute 1.43 10*3/mm3      Monocytes, Absolute 0.55 10*3/mm3      Eosinophils, Absolute 0.27 10*3/mm3      Basophils, Absolute 0.05 10*3/mm3      Immature Grans, Absolute 0.03 10*3/mm3      nRBC 0.0 /100 WBC     TSH Rfx On Abnormal To Free T4 [[id number]]  (Normal) Collected: 05/20/25 0841    Specimen: Blood Updated: 05/20/25 2055     TSH 2.860 uIU/mL           /60 (BP Location: Right arm, Patient Position: Lying)   Pulse 57   Temp 98.2 °F (36.8 °C) (Oral)   Resp 18   Ht 172.7 cm (68\")   Wt 67.7 kg (149 lb 4.8 oz)   SpO2 100%   BMI 22.70 kg/m²     Discharge Exam:  General Appearance:    Alert, cooperative, no distress                          Head:    Normocephalic, without obvious abnormality, atraumatic                          Eyes:                            Throat:   Lips, tongue, gums normal                          Neck:   Supple, symmetrical, trachea midline, no JVD                        Lungs:     Clear to auscultation bilaterally, respirations unlabored                Chest Wall:    No tenderness or deformity                        Heart:    Regular " rate and rhythm, S1 and S2 normal, no murmur,no  Rub  or gallop                  Abdomen:     Soft, non-tender, bowel sounds active, no masses, no                                                        organomegaly                  Extremities:   Extremities normal, atraumatic, no cyanosis or edema                             Skin:   Skin is warm and dry,  no rashes or palpable lesions                  Neurologic:   no focal deficits noted     Disposition:  Home    Activity as tolerated    Diet as tolerated  Diet Order   Procedures    Diet: Regular/House, Fluid Restriction (240 mL/tray); 1500 mL/day; Fluid Consistency: Thin (IDDSI 0)       Patient Instructions:      Discharge Medications        New Medications        Instructions Start Date   sodium chloride 1 g tablet   1 g, Oral, 2 Times Daily             Continue These Medications        Instructions Start Date   Eliquis 5 MG tablet tablet  Generic drug: apixaban   5 mg, Oral, 2 Times Daily      EPINEPHrine 0.3 MG/0.3ML solution auto-injector injection  Commonly known as: EpiPen 2-Nathan   0.3 mg, Intramuscular, Once As Needed, IF NEEDED FOR ALLERGIC REACTION. GO TO ER IMMEDIATELY AFTER ANY USE.      Glucosamine Chond Cmp Advanced tablet   1 tablet, Daily      metoprolol succinate XL 25 MG 24 hr tablet  Commonly known as: TOPROL-XL   25 mg, Oral, Daily      PROBIOTIC PO   1 tablet, Daily      timolol 0.5 % ophthalmic solution  Commonly known as: TIMOPTIC   No dose, route, or frequency recorded.             Future Appointments   Date Time Provider Department Center   5/28/2025 10:00 AM Iain Carey III, MD [facility]   6/16/2025  3:00 PM Bony Zhang DO [facility]   5/11/2026  1:00 PM [facility]      Follow-up Information       Bony Zhang DO Follow up in 1 week(s).    Specialty: Internal Medicine  Why: Continue with fluid restriction of 1500 cc/day.  Contact information:  [address] [employer] [address]  [address]  [phone]               Tra Corrigan MD Follow up.    Specialty: Nephrology  Contact information:  [facility]  [place] [address] [place] [address] [id number] [phone]                           Discharge Order (From admission, onward)       Start     Ordered    05/22/25 1406  Discharge patient  Once        Expected Discharge Date: 05/22/25   Discharge Disposition: Home or Self Care   Physician of Record for Attribution - Please select from Treatment Team: TOÑA BAUM [[id number]]   Review needed by CMO to determine Physician of Record: No      Question Answer Comment   Physician of Record for Attribution - Please select from Treatment Team TOÑA BAUM    Review needed by CMO to determine Physician of Record No        05/22/25 1407                    Total time spent discharging patient including evaluation,post hospitalization follow up,  medication and post hospitalization instructions and education total time exceeds 30 minutes.    Signed:  Toña Baum MD  5/22/2025  14:11 EDT

## 2025-05-22 NOTE — TELEPHONE ENCOUNTER
----- Message from Hue Ramos sent at 5/22/2025 11:55 AM EDT -----  Regarding: follow up  please have patient follow up in 8 weeks with xrays

## 2025-05-23 ENCOUNTER — TRANSITIONAL CARE MANAGEMENT TELEPHONE ENCOUNTER (OUTPATIENT)
Dept: CALL CENTER | Facility: HOSPITAL | Age: OVER 89
End: 2025-05-23
Payer: MEDICARE

## 2025-05-23 ENCOUNTER — TELEPHONE (OUTPATIENT)
Dept: NEUROSURGERY | Facility: CLINIC | Age: OVER 89
End: 2025-05-23
Payer: MEDICARE

## 2025-05-23 DIAGNOSIS — S22.080D COMPRESSION FRACTURE OF T12 VERTEBRA WITH ROUTINE HEALING, SUBSEQUENT ENCOUNTER: Primary | ICD-10-CM

## 2025-05-23 NOTE — OUTREACH NOTE
Call Center TCM Note      Flowsheet Row Responses   [facility] patient discharged from? [place]   Does the patient have one of the following disease processes/diagnoses(primary or secondary)? Other   TCM attempt successful? No   Unsuccessful attempts Attempt 1            Estefani TAI - Licensed Nurse    5/23/2025, 15:01 EDT

## 2025-05-23 NOTE — TELEPHONE ENCOUNTER
----- Message from Hue Ramos sent at 5/23/2025 10:12 AM EDT -----  Regarding: RE: follow up  Lets do both please.  ----- Message -----  From: Beverly De Jesus MA  Sent: 5/23/2025   8:28 AM EDT  To: CHAN Hammonds  Subject: RE: follow up                                    Would you like patient to have thoracic or lumbar xrays prior to next follow up?  ----- Message -----  From: Hue Ramos APRN  Sent: 5/22/2025  11:56 AM EDT  To: Karen Christine; Beverly De Jesus MA  Subject: follow up                                        please have patient follow up in 8 weeks with xrays

## 2025-05-23 NOTE — OUTREACH NOTE
Call Center TCM Note      Flowsheet Row Responses   [facility] patient discharged from? [employer]   Does the patient have one of the following disease processes/diagnoses(primary or secondary)? Other   TCM attempt successful? Yes   Call start time 1510   Call end time 1514   Discharge diagnosis Hyponatremia   Person spoke with today (if not patient) and relationship Spouse   Meds reviewed with patient/caregiver? Yes   Is the patient having any side effects they believe may be caused by any medication additions or changes? No   Does the patient have all medications ordered at discharge? Yes   Is the patient taking all medications as directed (includes completed medication regime)? Yes   Comments cardiology appt on 5/28 @ 10 and PCP appt on 5/20 @ 9:00   Does the patient have an appointment with their PCP within 7-14 days of discharge? Yes   Psychosocial issues? No   Did the patient receive a copy of their discharge instructions? Yes   Nursing interventions Reviewed instructions with patient   What is the patient's perception of their health status since discharge? Improving   Is the patient/caregiver able to teach back signs and symptoms related to disease process for when to call PCP? Yes   Is the patient/caregiver able to teach back signs and symptoms related to disease process for when to call 911? Yes   Is the patient/caregiver able to teach back the hierarchy of who to call/visit for symptoms/problems? PCP, Specialist, Home health nurse, Urgent Care, ED, 911 Yes   Additional teach back comments States they got a back brace but unsure on how to use it. They contact nursing and they are going to send some information on how to use.  She has questions regarding bone density test and when should this be scheduled.  Will message office regarding this.   TCM call completed? Yes   Wrap up additional comments Will message PCP office regarding bone density test.   Call end time 1514            Estefani Laura  Nurse    5/23/2025, 15:17 EDT

## 2025-05-24 LAB — 1,25(OH)2D SERPL-MCNC: 28.6 PG/ML (ref 24.8–81.5)

## 2025-05-26 NOTE — CASE MANAGEMENT/SOCIAL WORK
Case Management Discharge Note      Final Note: Home with outpt PT/OT with [facility], family to transport    Provided Post Acute Provider List?: Refused  Provided Post Acute Provider Quality & Resource List?: Refused    Selected Continued Care - Discharged on 5/22/2025 Admission date: 5/20/2025 - Discharge disposition: Home or Self Care      Destination    No services have been selected for the patient.                Durable Medical Equipment    No services have been selected for the patient.                Dialysis/Infusion    No services have been selected for the patient.                Home Medical Care    No services have been selected for the patient.                Therapy    No services have been selected for the patient.                Community Resources    No services have been selected for the patient.                Community & DME    No services have been selected for the patient.                    Transportation Services  Private: Car    Final Discharge Disposition Code: 01 - home or self-care

## 2025-05-28 ENCOUNTER — TELEPHONE (OUTPATIENT)
Dept: INTERNAL MEDICINE | Facility: CLINIC | Age: OVER 89
End: 2025-05-28

## 2025-05-28 NOTE — TELEPHONE ENCOUNTER
Spoke with patient wife Ana. States she will discuss this with Dr. Zhang at his appointment tomorrow to see if he still needs this test.

## 2025-05-28 NOTE — TELEPHONE ENCOUNTER
Caller: Blanka Leyva    Relationship: Emergency Contact    Best call back number: [phone]     What orders are you requesting (i.e. lab or imaging): DEXA SCAN    Where will you receive your lab/imaging services: [facility]

## 2025-05-30 ENCOUNTER — OFFICE VISIT (OUTPATIENT)
Dept: INTERNAL MEDICINE | Facility: CLINIC | Age: OVER 89
End: 2025-05-30
Payer: MEDICARE

## 2025-05-30 ENCOUNTER — NURSE TRIAGE (OUTPATIENT)
Dept: CALL CENTER | Facility: HOSPITAL | Age: OVER 89
End: 2025-05-30
Payer: MEDICARE

## 2025-05-30 VITALS
BODY MASS INDEX: 22.1 KG/M2 | OXYGEN SATURATION: 98 % | HEIGHT: 68 IN | TEMPERATURE: 97.3 F | DIASTOLIC BLOOD PRESSURE: 70 MMHG | HEART RATE: 67 BPM | SYSTOLIC BLOOD PRESSURE: 130 MMHG | WEIGHT: 145.8 LBS

## 2025-05-30 DIAGNOSIS — Z09 HOSPITAL DISCHARGE FOLLOW-UP: Primary | ICD-10-CM

## 2025-05-30 DIAGNOSIS — E87.1 HYPONATREMIA: ICD-10-CM

## 2025-05-30 DIAGNOSIS — S22.080D COMPRESSION FRACTURE OF T12 VERTEBRA WITH ROUTINE HEALING, SUBSEQUENT ENCOUNTER: ICD-10-CM

## 2025-05-30 DIAGNOSIS — Z91.018 ALLERGY TO ALPHA-GAL: ICD-10-CM

## 2025-05-30 DIAGNOSIS — M80.00XD AGE-RELATED OSTEOPOROSIS WITH CURRENT PATHOLOGICAL FRACTURE WITH ROUTINE HEALING, SUBSEQUENT ENCOUNTER: ICD-10-CM

## 2025-05-30 DIAGNOSIS — T78.49XA OTHER ALLERGY, INITIAL ENCOUNTER: ICD-10-CM

## 2025-05-30 DIAGNOSIS — M48.54XA COLLAPSED VERTEBRA, NOT ELSEWHERE CLASSIFIED, THORACIC REGION, INITIAL ENCOUNTER FOR FRACTURE: ICD-10-CM

## 2025-05-30 NOTE — PROGRESS NOTES
"Transitional Care Follow Up Visit  Subjective     Steffen Leyva is a 89 y.o. male who presents for a transitional care management visit.    Within 48 business hours after discharge our office contacted him via telephone to coordinate his care and needs.      I reviewed and discussed the details of that call along with the discharge summary, hospital problems, inpatient lab results, inpatient diagnostic studies, and consultation reports with Steffen.     Current outpatient and discharge medications have been reconciled for the patient.  Reviewed by: Bony Zhang DO          5/22/2025     6:17 PM   Date of TCM Phone Call   [facility]   Date of Admission 5/20/2025   Date of Discharge 5/22/2025   Discharge Disposition Home or Self Care     Risk for Readmission (LACE) Score: 7 (5/22/2025  6:00 AM)      History of Present Illness   Course During Hospital Stay:      \"Hospital Course: Steffen Leyva the patient is a 89-year-old male who was referred in for direct admission when it was noted that he had a low sodium of 126 on outpatient lab studies.  The patient also had been complaining of some back pain and has history of having probable compression fracture at T12.  This occurred approximately 3 weeks prior to admission.  The patient was admitted to the hospital and seen by neurosurgery and nephrology.  His sodium was improving and he was put on a fluid restriction and salt tablets.  Neurosurgery saw the patient and recommended back brace and they will see him for follow-up with x-rays in several weeks.  He will follow-up with his primary care in 1 week for ongoing care and also will help follow-up with nephrology next week to follow-up on his low sodium.  He was encouraged to stay with fluid restriction. \"     Taking salt tablets twice daily as prescribed since discharge.  He has been wearing the back brace that was prescribed at discharge. States overall his back pain is much better. States the muscle pain is " "much reduced. He is taking tylenol extra strength 1 in the morning and 1 at night.   Wife states he has no appetite at all since coming out of the hospital.he is eating 1-2 meals per day and 1 ensure per day. States his alpha gal allergy is a main reason his food is so restricted and he would like this re-tested.   He starts physical therapy next Monday.     The following portions of the patient's history were reviewed and updated as appropriate: allergies, current medications, past family history, past medical history, past social history, past surgical history, and problem list.        Objective   /70   Pulse 67   Temp 97.3 °F (36.3 °C) (Infrared)   Ht 172.7 cm (68\")   Wt 66.1 kg (145 lb 12.8 oz)   SpO2 98%   BMI 22.17 kg/m²   Physical Exam  Vitals reviewed.   Constitutional:       General: He is not in acute distress.     Appearance: Normal appearance. He is normal weight. He is not ill-appearing.   Eyes:      General: No scleral icterus.  Pulmonary:      Effort: Pulmonary effort is normal. No respiratory distress.   Skin:     Coloration: Skin is jaundiced.   Neurological:      Mental Status: He is alert.   Psychiatric:         Mood and Affect: Mood normal.         Behavior: Behavior normal.         Thought Content: Thought content normal.         Assessment & Plan   Diagnoses and all orders for this visit:    1. Hospital discharge follow-up (Primary)  2. Age-related osteoporosis with current pathological fracture with routine healing, subsequent encounter  3. Compression fracture of T12 vertebra with routine healing, subsequent encounter  4. 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 Hyponatremia  6. Other allergy, initial encounter  -Patient here today for hospital discharge follow-up.  He was hospitalized at [facility] from 5/20/2025 to 5/22/2025.  For hyponatremia and back pain related to T12 compression fracture.  Also moderate protein calorie malnutrition.  I reviewed hospital discharge summary. I " also reviewed MRI- t spine report and day of discharge labs.  -at this point I will recheck his sodium. Nephrology contact information provided. He is taking salt tabs as prescribed. Adjust dosage as needed.   -alpha gal panel will be retested per his request given this is a limitation of his diet. He previously had repeat alpha gal testing in 2024 that was still positive. Recommend he drink at least 2 ensure daily. If weight decreases further, we will need to consider an appetite stimulant.  -he will follow with neurosurgery as scheduled for his compression fracture. Told patient this is an indication of OSTEOPOROSIS and we need to obtain DEXA scan to get a baseline and then we can move forward with treatment options.  DEXA ordered today. He is taking vitamin D supplement after recent discovery of vitamin D deficiency. His calcium has been normal on labs.  -I will follow up closely with him in 6 weeks.

## 2025-05-30 NOTE — TELEPHONE ENCOUNTER
"Reason for Disposition   Health Information question, no triage required and triager able to answer question    Additional Information   Negative: [1] Caller is not with the adult (patient) AND [2] reporting urgent symptoms   Negative: Lab result questions   Negative: Medication questions   Negative: Caller can't be reached by phone   Negative: Caller has already spoken to PCP or another triager   Negative: RN needs further essential information from caller in order to complete triage   Negative: Requesting regular office appointment   Negative: [1] Caller requesting NON-URGENT health information AND [2] PCP's office is the best resource    Answer Assessment - Initial Assessment Questions  1. REASON FOR CALL or QUESTION: \"What is your reason for calling today?\" or \"How can I best help you?\" or \"What question do you have that I can help answer?\"      Asking how to schedule Bone Density test for her     Protocols used: Information Only Call - No Triage-ADULT-    "

## 2025-05-30 NOTE — TELEPHONE ENCOUNTER
Asking how to make an appointment for a Bone Density test. 05/30/2025 Caller states D/C instructions for her  included to make an appointment for a Bone Density test. Asking how to do this.    Called Radiology at [facility] and call went to . Called [facility]  who states Bone density tests are done in Mammography and transferred call to that department. Transferred to  and left message for them to call patient to arrange appointment. Advised caller this was done and gave her direct number to call if they do not reach out to her.

## 2025-06-02 ENCOUNTER — TELEPHONE (OUTPATIENT)
Dept: INTERNAL MEDICINE | Facility: CLINIC | Age: OVER 89
End: 2025-06-02

## 2025-06-02 ENCOUNTER — READMISSION MANAGEMENT (OUTPATIENT)
Dept: CALL CENTER | Facility: HOSPITAL | Age: OVER 89
End: 2025-06-02
Payer: MEDICARE

## 2025-06-02 NOTE — TELEPHONE ENCOUNTER
Scarlet with PT is calling in regards to mutual pt- She is wanting to know what the diagnosis is for pt and his back injury- She was advised by pt that he needs to wear back brace for up to 2 months and she would like to know details in regards to back injury please- I did advise Scarlet of the visit diagnosis of pts last visit with Dr. Zhang in regards to his back but Scarlet would still like a call back to discuss at earliest convenience.       Best call back number for Scarlet  [phone]

## 2025-06-02 NOTE — OUTREACH NOTE
Medical Week 2 Survey      Flowsheet Row Responses   [facility] patient discharged from? [place]   Does the patient have one of the following disease processes/diagnoses(primary or secondary)? Other   Week 2 attempt successful? Yes   Call start time 1130   Discharge diagnosis Hyponatremia   Call end time 1131   Person spoke with today (if not patient) and relationship Spouse- Blanka   Does the patient have a primary care provider?  Yes   Has home health visited the patient within 72 hours of discharge? N/A   Psychosocial issues? No   Did the patient receive a copy of their discharge instructions? Yes   Nursing interventions Reviewed instructions with patient   What is the patient's perception of their health status since discharge? Improving   Is the patient/caregiver able to teach back signs and symptoms related to disease process for when to call PCP? Yes   Is the patient/caregiver able to teach back signs and symptoms related to disease process for when to call 911? Yes   Is the patient/caregiver able to teach back the hierarchy of who to call/visit for symptoms/problems? PCP, Specialist, Home health nurse, Urgent Care, ED, 911 Yes   Week 2 Call Completed? Yes   Graduated Yes   Wrap up additional comments Spouse reports patient is doing well. No concerns or questions noted.   Call end time 1131            Nathalia FRITZ - Registered Nurse

## 2025-06-03 LAB
ALPHA-GAL IGE QN: 3.81 KU/L
BEEF IGE QN: 1.15 KU/L
BUN SERPL-MCNC: 19 MG/DL (ref 10–36)
BUN/CREAT SERPL: 21 (ref 10–24)
CALCIUM SERPL-MCNC: 9.3 MG/DL (ref 8.6–10.2)
CHLORIDE SERPL-SCNC: 96 MMOL/L (ref 96–106)
CO2 SERPL-SCNC: 21 MMOL/L (ref 20–29)
CONV CLASS DESCRIPTION: ABNORMAL
CREAT SERPL-MCNC: 0.92 MG/DL (ref 0.76–1.27)
EGFRCR SERPLBLD CKD-EPI 2021: 79 ML/MIN/1.73
GLUCOSE SERPL-MCNC: 93 MG/DL (ref 70–99)
IGE SERPL-ACNC: 436 IU/ML (ref 6–495)
LAMB IGE QN: 0.84 KU/L
PORK IGE QN: 0.38 KU/L
POTASSIUM SERPL-SCNC: 4.5 MMOL/L (ref 3.5–5.2)
SODIUM SERPL-SCNC: 132 MMOL/L (ref 134–144)

## 2025-06-04 ENCOUNTER — TELEPHONE (OUTPATIENT)
Dept: NEUROSURGERY | Facility: CLINIC | Age: OVER 89
End: 2025-06-04
Payer: MEDICARE

## 2025-06-04 ENCOUNTER — NURSE TRIAGE (OUTPATIENT)
Dept: CALL CENTER | Facility: HOSPITAL | Age: OVER 89
End: 2025-06-04
Payer: MEDICARE

## 2025-06-04 NOTE — TELEPHONE ENCOUNTER
"Reason for Disposition   [1] Caller requesting NON-URGENT health information AND [2] PCP's office is the best resource    Additional Information   Negative: [1] Caller is not with the adult (patient) AND [2] reporting urgent symptoms   Negative: Lab result questions   Negative: Medication questions   Negative: Caller can't be reached by phone   Negative: Caller has already spoken to PCP or another triager   Negative: RN needs further essential information from caller in order to complete triage   Negative: Requesting regular office appointment    Answer Assessment - Initial Assessment Questions  1. REASON FOR CALL or QUESTION: \"What is your reason for calling today?\" or \"How can I best help you?\" or \"What question do you have that I can help answer?\"  Callers says  has a back brace Was seen by neurosurgery while hospitalized. No instructions regarding back brace (back brace was for back pain)  No mention of back brace on AVS, asking how long he should wear it. Caller says they received no instructions regarding back brace.  He has a follow up appointment with neurosurgery next month. But no info on AVS regarding appointment  Phone number for neurosurgery office given to caller  [phone]  Advised to call for instructions on back brace and for address to office for appointment  Appt scheduled for 07/23/2025 at 12:30 pm    Protocols used: Information Only Call - No Triage-ADULT-    "

## 2025-06-04 NOTE — TELEPHONE ENCOUNTER
Patient's wide called in stating he received a back brace while in hospital and does not know how to use it. Please advise spouse on what to do with back brace. So patient has not been wearing back brace.

## 2025-06-05 ENCOUNTER — HOSPITAL ENCOUNTER (OUTPATIENT)
Dept: BONE DENSITY | Facility: HOSPITAL | Age: OVER 89
Discharge: HOME | End: 2025-06-05
Admitting: STUDENT IN AN ORGANIZED HEALTH CARE EDUCATION/TRAINING PROGRAM
Payer: MEDICARE

## 2025-06-05 PROCEDURE — 77080 DXA BONE DENSITY AXIAL: CPT

## 2025-06-05 NOTE — TELEPHONE ENCOUNTER
PATIENTS WIFE (JORGE- PATIENT GAVE ME CONSENT TO SPEAK WITH HER REGARDING HIS CARE) CALLED CHECKING THE STATUS OF THIS NOTE.     PLEASE CALL JORGE @ PHONE NUMBER [phone] STATES IF SHE CANNOT BE REACHED PLEASE CALL PATIENTS DAUGHTER EMI BOYCE @ PHONE NUMBER [phone]    THANK YOU

## 2025-06-06 ENCOUNTER — TELEPHONE (OUTPATIENT)
Dept: INTERNAL MEDICINE | Facility: CLINIC | Age: OVER 89
End: 2025-06-06
Payer: MEDICARE

## 2025-06-06 NOTE — TELEPHONE ENCOUNTER
Please clarify if the extra tab he is taking is metoprolol? How many days per week is he taking an extra tab? How often is he checking his BP and what is the average?

## 2025-06-06 NOTE — TELEPHONE ENCOUNTER
Patient's daughter Shonda called and stated that his blood pressure is running high. Said it is 170/90. Daughter also stated that when his blood pressure is high he will take an extra pill of the medication. Daughter wants to know if patient is allowed to do this. She is worried about him taking an extra does just wants to make sure ok to do this. Would like to speak with a medical assistant.    Shonda's Best call back # [phone]

## 2025-06-11 ENCOUNTER — OFFICE VISIT (OUTPATIENT)
Age: OVER 89
End: 2025-06-11
Payer: MEDICARE

## 2025-06-11 ENCOUNTER — TELEPHONE (OUTPATIENT)
Dept: INTERNAL MEDICINE | Facility: CLINIC | Age: OVER 89
End: 2025-06-11

## 2025-06-11 VITALS
WEIGHT: 136 LBS | HEIGHT: 68 IN | BODY MASS INDEX: 20.61 KG/M2 | HEART RATE: 56 BPM | SYSTOLIC BLOOD PRESSURE: 110 MMHG | DIASTOLIC BLOOD PRESSURE: 78 MMHG

## 2025-06-11 DIAGNOSIS — E87.1 HYPONATREMIA: ICD-10-CM

## 2025-06-11 DIAGNOSIS — G47.33 OSA (OBSTRUCTIVE SLEEP APNEA): ICD-10-CM

## 2025-06-11 DIAGNOSIS — I48.0 PAROXYSMAL ATRIAL FIBRILLATION: Primary | ICD-10-CM

## 2025-06-11 DIAGNOSIS — I10 PRIMARY HYPERTENSION: ICD-10-CM

## 2025-06-11 DIAGNOSIS — U09.9 LONG COVID: ICD-10-CM

## 2025-06-11 NOTE — PROGRESS NOTES
Date of Office Visit: 2025  Encounter Provider: CHAN Go  Place of Service: [facility] CARDIOLOGY  Patient Name: Steffen Leyva  :1935    Chief complaint  Hospital follow up    History of Present Illness  Patient is a 89 y.o. year old male  patient of Dr. Carey. When seen by Dr. Carey in ,  there was a questionable remote history of atrial fibrillation, although we were never able to find any documentation of that.  At the time, he stated he thought it was around  when it occurred and it had been related to excess caffeine intake.  We did have him wear a Holter monitor that showed no significant arrhythmia.     He saw Dr. Carey on 2022 and stated he had been having recurrent episodes of this rapid irregular heart rate.  He believed it was atrial fibrillation.  He was seen in the urgent care center in [place] (he spends about 6 months a year in [place]) but they were not able to document any atrial fibrillation.  Blood pressure was elevated so they started him on amlodipine. Monitor study at that time showed paroxysmal atrial fibrillation at 4% of total monitored time. He was started on metoprolol and Eliquis. Patient then underwent sleep study which showed severe sleep apnea; he is now completely compliant on CPAP.     Last visit in 2024 he reported long COVID symptoms since his COVID infection in 2023.  He reported significant fatigue and vestibular symptoms.  He was working with physical therapy.    He was admitted in May 2025 for hyponatremia (Na 127) and back pain.  He was found to have a compression fracture at T12 and was fitted with back brace and has follow-up with neurosurgery for this.  He is following with nephrology for hyponatremia and was started on fluid restriction as well as sodium chloride tablets.      Interval history  Patient presents today for hospital follow-up.  I will visit with him today and have reviewed his  medical record. His daughter is with him at appointment today per his preference. Since discharge he has overall been doing well. He has noticed since discharge blood pressure is higher (likely due to sodium chloride tablets). BP in office today is well controlled. He has not had BP device checked for accuracy or changed the batteries recently. He has not had follow up with nephrology for hyponatremia though PCP did recheck labs on 5/30/25 and sodium had improved to 132.  He had a fall this past Saturday after reaching to grab something from his chair and falling forward.  He denies any syncope or dizziness or other preceding symptoms.  He did strike the side of his face and has a bruise under his left eye from this.  He did not seek treatment in the ER after the fall as he felt it was minor.  His only real complaint is fatigue which he has had since his COVID diagnosis in fall 2023.  He denies palpitations, shortness of breath, edema, dizziness, chest pain or chest pressure, syncope or presyncope.  He is compliant with CPAP.      Past Medical History:   Diagnosis Date    Arthritis     Atrial fibrillation     Cataract     Corneal abrasion, left 07/23/2015    SEEN AT [facility] ER    COVID-19 09/2023    Elevated PSA 07/2015    Glaucoma     Hemorrhoids     Hypertension     IGT (impaired glucose tolerance) 07/2015    Osteoporosis     Prostate cancer 07/2019     Past Surgical History:   Procedure Laterality Date    COLONOSCOPY N/A 1994    NO RECORDS, OLD CHART NOT AVAILABLE, DR. DAHIANA CURTIS    COLONOSCOPY N/A 12/07/2005    RIGHT COLON LESION, PATH: TUBULAR ADENOMA, SIGMOID DIVERTICULOSIS, DR. JENNY XIE AT [facility]    COLONOSCOPY N/A 07/05/2018    Procedure: COLONOSCOPY to cecum with hot snare polypectomy with resolution clip x 1, and spot tattoo marker;  Surgeon: Cathi Coto MD;  Location: [facility] ENDOSCOPY;  Service: Gastroenterology    COLONOSCOPY N/A 07/10/2020    Procedure: COLONOSCOPY INTO CECUM AND NORMAL TI WITH  BX;  Surgeon: Jose Francisco Rodriguez MD;  Location:  [facility] END[facility]COPY;  Service: Gastroenterology;  Laterality: N/A;  PRE: DIARRHEA   POST: DIVERTICULOSIS, INK AND SCAR-POST POLYPECTOMY SITE, HEMORRHOIDS     ENDOSCOPY N/A 07/10/2020    Procedure: ESOPHAGOGASTRODUODENOSCOPY WITH BX;  Surgeon: Jose Francisco Rodriguez MD;  Location: [facility] ENDOSCOPY;  Service: Gastroenterology;  Laterality: N/A;  PRE: POSITIVE CELIAC ANTIBODIES   POST: NORMAL    EYE SURGERY Bilateral 2012    RELIEF OF ELEVATED INTRAOCULAR PRESSURE, PERFORMED IN [facility]    HEMORRHOID BANDING N/A 01/19/2006    DR. DAHIANA CURTIS    HEMORRHOID BANDING N/A 1980    NO RECORD AVAILABLE    KNEE ARTHROPLASTY Right 2013    D/T MENISCUS TEAR, DR. SANTACRUZ IN [place], [place]    KNEE ARTHROSCOPY Left 1980    REPLACEMENT TOTAL KNEE      TOTAL KNEE ARTHROPLASTY Right 03/2023     Outpatient Medications Prior to Visit   Medication Sig Dispense Refill    Eliquis 5 MG tablet tablet TAKE 1 TABLET TWICE DAILY 180 tablet 3    EPINEPHrine (EpiPen 2-Nathan) 0.3 MG/0.3ML solution auto-injector injection Inject 0.3 mL into the appropriate muscle as directed by prescriber 1 (One) Time As Needed (anaphlyxis .) for up to 4 doses. IF NEEDED FOR ALLERGIC REACTION. GO TO ER IMMEDIATELY AFTER ANY USE. 2 each 1    metoprolol succinate XL (TOPROL-XL) 25 MG 24 hr tablet TAKE 1 TABLET EVERY DAY 90 tablet 3    Probiotic Product (PROBIOTIC PO) Take 1 tablet by mouth Daily.      sodium chloride 1 g tablet Take 1 tablet by mouth 2 (Two) Times a Day. 60 tablet 0    timolol (TIMOPTIC) 0.5 % ophthalmic solution       Misc Natural Products (Glucosamine Chond Cmp Advanced) tablet Take 1 tablet by mouth Daily. (Patient not taking: Reported on 6/11/2025)       No facility-administered medications prior to visit.       Allergies as of 06/11/2025 - Reviewed 06/11/2025   Allergen Reaction Noted    Prednisolone Unknown - High Severity 07/17/2015    Lactose GI Intolerance 05/14/2024     "Oxycodone-acetaminophen Nausea And Vomiting 2023     Social History     Socioeconomic History    Marital status:     Number of children: 5   Tobacco Use    Smoking status: Former     Current packs/day: 0.00     Average packs/day: 1 pack/day for 29.0 years (29.0 ttl pk-yrs)     Types: Cigarettes     Start date: 7/10/1956     Quit date: 7/10/1985     Years since quittin.9    Smokeless tobacco: Never   Vaping Use    Vaping status: Never Used   Substance and Sexual Activity    Alcohol use: Yes     Alcohol/week: 7.0 standard drinks of alcohol     Types: 7 Standard drinks or equivalent per week    Drug use: Never     Family History   Problem Relation Age of Onset    Aortic aneurysm Mother     Glaucoma Mother     Aneurysm Mother     Heart disease Father 80    Heart disease Brother 60    Alcohol abuse Brother     No Known Problems Brother      Review of Systems   Constitutional: Positive for malaise/fatigue.   Cardiovascular:  Negative for chest pain, claudication, dyspnea on exertion, leg swelling, near-syncope, orthopnea, palpitations, paroxysmal nocturnal dyspnea and syncope.   Respiratory:  Negative for shortness of breath.    Musculoskeletal:  Positive for falls.   Neurological:  Negative for brief paralysis, dizziness, headaches and light-headedness.   All other systems reviewed and are negative.       Objective:     Vitals:    25 1406   BP: 110/78   BP Location: Left arm   Patient Position: Sitting   Cuff Size: Small Adult   Pulse: 56   Weight: 61.7 kg (136 lb)   Height: 172.7 cm (68\")     Body mass index is 20.68 kg/m².    Vitals reviewed.   Constitutional:       General: Not in acute distress.     Appearance: Well-developed and not in distress. Frail. Not diaphoretic.   HENT:      Head: Normocephalic.   Pulmonary:      Effort: Pulmonary effort is normal. No respiratory distress.      Breath sounds: Normal breath sounds. No wheezing. No rhonchi. No rales.   Cardiovascular:      Normal rate. " "Regular rhythm.      Murmurs: There is no murmur.   Pulses:     Radial: 2+ bilaterally.  Edema:     Peripheral edema absent.   Skin:     General: Skin is warm and dry. There is no cyanosis.      Findings: No rash.   Neurological:      Mental Status: Alert and oriented to person, place, and time.   Psychiatric:         Behavior: Behavior normal. Behavior is cooperative.         Thought Content: Thought content normal.         Judgment: Judgment normal.       Lab Review:     Lab Results   Component Value Date     (L) 05/30/2025     (L) 05/22/2025    K 4.5 05/30/2025    K 4.4 05/22/2025    CL 96 05/30/2025    CL 96 (L) 05/22/2025    CO2 21 05/30/2025    CO2 22.0 05/22/2025    BUN 19 05/30/2025    BUN 11 05/22/2025    CREATININE 0.92 05/30/2025    CREATININE 0.69 (L) 05/22/2025    EGFRIFNONA 69 06/15/2020    EGFRIFNONA >60 07/10/2015    EGFRIFAFRI 84 06/15/2020    EGFRIFAFRI >60 07/10/2015    GLUCOSE 93 05/30/2025    GLUCOSE 84 05/22/2025    CALCIUM 9.3 05/30/2025    CALCIUM 8.8 05/22/2025    ALBUMIN 4.1 05/20/2025    ALBUMIN 4.4 05/16/2025    BILITOT 0.2 05/20/2025    BILITOT 0.3 05/16/2025    AST 16 05/20/2025    AST 20 05/16/2025    ALT 8 05/20/2025    ALT 12 05/16/2025     Lab Results   Component Value Date    WBC 4.78 05/22/2025    WBC 4.10 05/21/2025    HGB 12.8 (L) 05/22/2025    HGB 11.9 (L) 05/21/2025    HCT 38.1 05/22/2025    HCT 36.3 (L) 05/21/2025    MCV 93.4 05/22/2025    MCV 97.3 (H) 05/21/2025     05/22/2025     05/21/2025     No results found for: \"PROBNP\", \"BNP\"  No results found for: \"CKTOTAL\", \"CKMB\", \"CKMBINDEX\", \"TROPONINI\", \"TROPONINT\"  Lab Results   Component Value Date    TSH 2.860 05/20/2025    TSH 1.520 05/16/2025             ECG 12 Lead    Date/Time: 6/11/2025 3:34 PM  Performed by: Beena Bustos APRN    Authorized by: Beena Bustos APRN  Comparison: compared with previous ECG   Similar to previous ECG  Rhythm: sinus rhythm  Rate: normal  BPM: 56  QRS axis: " left  Comments: Similar to prior        Assessment:       Diagnosis Plan   1. Paroxysmal atrial fibrillation        2. SHELTON (obstructive sleep apnea)        3. Hyponatremia        4. Long COVID        5. Primary hypertension          Plan:       1.  Paroxysmal atrial fibrillation- continue metoprolol and Eliquis, [id number] score of 3.  Maintaining sinus rhythm on EKG today.  Discussed the need to avoid falls.  His daughter has gotten him a cane and I encouraged him to use this.  He is also participating in physical therapy.  If he continues to have falls would need to consider risk of bleeding with Eliquis versus risk of stroke from atrial fibrillation.  Watchman device may be beneficial for him.  2.  Hypertension-good control today, readings at home with been elevated but his device has not been checked.  They will bring device to upcoming appointment to have this checked for accuracy.  I also asked him to make sure he was checking blood pressure after his medications and not immediately prior to medication.  Given his fatigue I asked him to move metoprolol dosing to the evening to see if this helps with his fatigue symptoms.  3.  Obstructive sleep apnea- severe, AHI index greater than 60, highly compliant with CPAP  4.  Long COVID symptoms.  5.  Recent hospitalization for hyponatremia.  Sodium improved on postdischarge labs however not quite normalized despite twice daily sodium chloride tablets and compliance with fluid restriction.  I encouraged him to follow-up with nephrology for further recommendations.      Time Spent: I spent 40 minutes caring for Steffen on this date of service. This time includes time spent by me in the following activities: preparing for the visit, reviewing tests, performing a medically appropriate examination and/or evaluations, counseling and educating the patient/family/caregiver, ordering medications, tests, or procedures, documenting information in the medical record, and  independently interpreting results and communicating that information with the patient/family/caregiver.   I spent 1 minutes on the separately reported service of ECG. This time is not included in the time used to support the E/M service also reported today.        Your medication list            Accurate as of June 11, 2025  3:37 PM. If you have any questions, ask your nurse or doctor.                CONTINUE taking these medications        Instructions Last Dose Given Next Dose Due   Eliquis 5 MG tablet tablet  Generic drug: apixaban      TAKE 1 TABLET TWICE DAILY       EPINEPHrine 0.3 MG/0.3ML solution auto-injector injection  Commonly known as: EpiPen 2-Nathan      Inject 0.3 mL into the appropriate muscle as directed by prescriber 1 (One) Time As Needed (anaphlyxis .) for up to 4 doses. IF NEEDED FOR ALLERGIC REACTION. GO TO ER IMMEDIATELY AFTER ANY USE.       metoprolol succinate XL 25 MG 24 hr tablet  Commonly known as: TOPROL-XL      TAKE 1 TABLET EVERY DAY       PROBIOTIC PO      Take 1 tablet by mouth Daily.       sodium chloride 1 g tablet      Take 1 tablet by mouth 2 (Two) Times a Day.       timolol 0.5 % ophthalmic solution  Commonly known as: TIMOPTIC                    Patient is no longer taking -.  I corrected the med list to reflect this.  I did not stop these medications.    Return in about 1 year (around 6/11/2026) for With Dr. Carey.      Dictated utilizing Dragon dictation

## 2025-06-11 NOTE — TELEPHONE ENCOUNTER
Caller: Steffen Leyva    Relationship: Self    Best call back number: [phone]     Who are you requesting to speak with (clinical staff, provider,  specific staff member):         What was the call regarding: PATIENT IS NEEDING A COPY OF THE PRESCRIPTION FOR THE RECLINER CHAIR FOR WHEN IT IS DROPPED OFF.    PATIENT WOULD LIKE THIS BY EMAIL. OR YOU CAN ADD IT TO HIS MYCHART.

## 2025-06-30 RX ORDER — SODIUM CHLORIDE 1 G/1
1 TABLET ORAL 2 TIMES DAILY
Qty: 180 TABLET | Refills: 0 | Status: SHIPPED | OUTPATIENT
Start: 2025-06-30

## 2025-06-30 NOTE — TELEPHONE ENCOUNTER
Caller: Steffen Leyva    Relationship to patient: Self    Best call back number: [phone]     Patient is needing: PATIENT'S WIFE CALLED TO CHECK ON THE STATUS OF THIS REQUEST, AS SHE IS AT THE PHARMACY NOW.     SHE IS NOT ON A VERBAL SO I WAS UNABLE TO GIVE HER INFO.     SHE STATED PATIENT CALLED IN EARLIER TODAY, I TOLD HER IF HE DID THAT PROVIDERS HAVE 24-48 HRS TO RESPOND TO THESE MESSAGES, BUT THAT I WOULD SEND BACK A FOLLOW UP MESSAGE.

## 2025-06-30 NOTE — TELEPHONE ENCOUNTER
Caller: Steffen Leyva    Relationship: Self    Best call back number: [phone]     Requested Prescriptions:   Requested Prescriptions     Pending Prescriptions Disp Refills    sodium chloride 1 g tablet 60 tablet 0     Sig: Take 1 tablet by mouth 2 (Two) Times a Day.        Pharmacy where request should be sent: [facility] [id number] [address]  AT [address] [phone] [facility] [phone]      Last office visit with prescribing clinician: 5/30/2025   Last telemedicine visit with prescribing clinician: Visit date not found   Next office visit with prescribing clinician: 7/14/2025     Additional details provided by patient: NEW PRESCRIPTION 60 TABLETS.  REQUEST TO  AT 1:00 PM TODAY AT [facility]    Does the patient have less than a 3 day supply:  [x] Yes  [] No    Would you like a call back once the refill request has been completed: [] Yes [] No    If the office needs to give you a call back, can they leave a voicemail: [] Yes [] No    Conner Allred   06/30/25 09:56 EDT

## 2025-07-14 ENCOUNTER — OFFICE VISIT (OUTPATIENT)
Dept: INTERNAL MEDICINE | Facility: CLINIC | Age: OVER 89
End: 2025-07-14
Payer: MEDICARE

## 2025-07-14 VITALS
HEART RATE: 61 BPM | OXYGEN SATURATION: 96 % | DIASTOLIC BLOOD PRESSURE: 62 MMHG | TEMPERATURE: 97.4 F | SYSTOLIC BLOOD PRESSURE: 120 MMHG | HEIGHT: 68 IN | BODY MASS INDEX: 21.52 KG/M2 | WEIGHT: 142 LBS

## 2025-07-14 DIAGNOSIS — M80.00XD AGE-RELATED OSTEOPOROSIS WITH CURRENT PATHOLOGICAL FRACTURE WITH ROUTINE HEALING, SUBSEQUENT ENCOUNTER: Primary | ICD-10-CM

## 2025-07-14 DIAGNOSIS — S22.080D COMPRESSION FRACTURE OF T12 VERTEBRA WITH ROUTINE HEALING, SUBSEQUENT ENCOUNTER: ICD-10-CM

## 2025-07-14 DIAGNOSIS — E55.9 VITAMIN D DEFICIENCY: ICD-10-CM

## 2025-07-14 DIAGNOSIS — I10 ESSENTIAL (PRIMARY) HYPERTENSION: ICD-10-CM

## 2025-07-14 PROCEDURE — 99214 OFFICE O/P EST MOD 30 MIN: CPT | Performed by: STUDENT IN AN ORGANIZED HEALTH CARE EDUCATION/TRAINING PROGRAM

## 2025-07-14 PROCEDURE — 1125F AMNT PAIN NOTED PAIN PRSNT: CPT | Performed by: STUDENT IN AN ORGANIZED HEALTH CARE EDUCATION/TRAINING PROGRAM

## 2025-07-14 PROCEDURE — G2211 COMPLEX E/M VISIT ADD ON: HCPCS | Performed by: STUDENT IN AN ORGANIZED HEALTH CARE EDUCATION/TRAINING PROGRAM

## 2025-07-14 NOTE — LETTER
July 14, 2025    Steffen Leyva  [address] [place] [address]    To Whom It May Concern,    I am writing to request special consideration for my patient, Steffen Leyva. He has been under my care for several years and is currently managing multiple chronic health conditions, including arthritis, osteoporosis, and atrial fibrillation.    Due to these medical conditions, Steffen Leyva experiences significant mobility challenges and requires continuous support to ensure safety and comfort during travel. A travel  dog has been an integral part of his daily life, providing not only emotional support but also practical assistance with mobility and stability.    The presence of the  dog helps mitigate the risk of falls and provides reassurance during episodes of discomfort or anxiety related to his medical conditions. Allowing the dog to accompany Steffen Leyva during travel would greatly enhance his ability to manage these health issues effectively and travel safely.    I kindly request that you larry permission for Steffen Leyva to travel with his  dog. I am confident that this accommodation will significantly contribute to a safer and more comfortable travel experience.    Please feel free to contact me at [phone] should you require any further information or documentation regarding this request.    Thank you for your understanding and consideration.    Sincerely,          Bony Zhang, DO  Internal Medicine

## 2025-07-14 NOTE — PROGRESS NOTES
Bony Zhang DO, [facility]  Internal Medicine  [facility]  [address], [address]  [place] [address]  [phone]    Chief Complaint  6 weeks follow-up (Would like a letter for comfort dog. ) osteoporosis follow-up    SUBJECTIVE    History of Present Illness    Steffen Leyva is a 89 y.o. male who presents to the office today as an established patient that last saw me on 5/30/2025.  Here today with his wife who helps provide history.    Osteoporosis: new diagnosis since last visit, here to discuss therapy. He is taking vitamin D supplement over the counter, perhaps 1000 units daily. For exercise he walks 100 yards daily outside and walks in the house. States yesterday he walked around a mile. States he is in physical therapy for his spinal compression pain but currently has no pain from that .       Allergies   Allergen Reactions    Prednisolone Unknown - High Severity     High Eye pressure    Lactose GI Intolerance    Oxycodone-Acetaminophen Nausea And Vomiting        Outpatient Medications Marked as Taking for the 7/14/25 encounter (Office Visit) with Bony Zhang DO   Medication Sig Dispense Refill    Eliquis 5 MG tablet tablet TAKE 1 TABLET TWICE DAILY 180 tablet 3    EPINEPHrine (EpiPen 2-Nathan) 0.3 MG/0.3ML solution auto-injector injection Inject 0.3 mL into the appropriate muscle as directed by prescriber 1 (One) Time As Needed (anaphlyxis .) for up to 4 doses. IF NEEDED FOR ALLERGIC REACTION. GO TO ER IMMEDIATELY AFTER ANY USE. 2 each 1    metoprolol succinate XL (TOPROL-XL) 25 MG 24 hr tablet TAKE 1 TABLET EVERY DAY 90 tablet 3    Probiotic Product (PROBIOTIC PO) Take 1 tablet by mouth Daily.      sodium chloride 1 g tablet Take 1 tablet by mouth 2 (Two) Times a Day. 180 tablet 0    timolol (TIMOPTIC) 0.5 % ophthalmic solution           Past Medical History:   Diagnosis Date    Arthritis     Atrial fibrillation     Cataract     Corneal abrasion, left 07/23/2015    SEEN AT [facility] ER     "COVID-19 09/2023    Elevated PSA 07/2015    Glaucoma     Hemorrhoids     Hypertension     IGT (impaired glucose tolerance) 07/2015    Osteoporosis     Prostate cancer 07/2019       OBJECTIVE    Vital Signs:   /62   Pulse 61   Temp 97.4 °F (36.3 °C) (Infrared)   Ht 172.7 cm (68\")   Wt 64.4 kg (142 lb)   SpO2 96%   BMI 21.59 kg/m²        Physical Exam  Vitals reviewed.   Constitutional:       General: He is not in acute distress.     Appearance: He is normal weight. He is not ill-appearing.   Eyes:      General: No scleral icterus.  Pulmonary:      Effort: Pulmonary effort is normal. No respiratory distress.   Skin:     Coloration: Skin is not jaundiced.   Neurological:      Mental Status: He is alert.   Psychiatric:         Mood and Affect: Mood normal.         Behavior: Behavior normal.         Thought Content: Thought content normal.                             ASSESSMENT & PLAN     Diagnoses and all orders for this visit:    1. Age-related osteoporosis with current pathological fracture with routine healing, subsequent encounter (Primary)  2. Compression fracture of T12 vertebra with routine healing, subsequent encounter  3. Vitamin D deficiency  -new diagnosis since last visit, here to discuss therapy. He is taking vitamin D supplement over the counter, perhaps 1000 units daily. For exercise he walks 100 yards daily outside and walks in the house. States yesterday he walked around a mile. States he is in physical therapy for his spinal compression pain but currently has no pain from that .   -DEXA scan from 6/5/2025 with T-score -2.1 in the lumbar spine as well as -2.0 in the left hip  -check vitamin D, Calcium, renal function (with cystatin C given low muscle mass) as well as Mag and phos today  -he remains a high falls risk  -stressed importance of regular exercise at least 1.5 hours weekly  to help with bone strength  -Discussed with patient the pathophysiology of osteoporosis as well as the " mechanism of action of bisphosphonate therapy.  Discussed that these medications are believed to have benefit in men though most of their fracture reducing benefit has been studied and seen in women. Advised him common side effects including Potential for allergic reaction, rash, GI side effects such as nausea, vomiting, diarrhea, abdominal pain, joint pains, rare risk of osteonecrosis of the jaw and to alert her dentists that she is on therapy, potential for fracture of other bones. He states he has complete dentures.   -we discussed oral option such as alendronate versus once yearly Reclast. He would like to pursue reclast. I will proceed with reclast as long as his renal function and electrolytes are appropriate once those result.  -     Vitamin D,25-Hydroxy  -     Cystatin C With Glomerular Filtration Rate, Estimated  -     Magnesium  -     Phosphorus  -     Comprehensive Metabolic Panel    4. Essential (primary) hypertension  - reviewed most recent cardiology progress note. Taking metoprolol succinate 25 mg daily. BP well controlled 120/62 in office today.            Follow Up  Return in about 2 months (around 9/14/2025) for Medicare Wellness.    Patient/family had no further questions at this time and verbalized understanding of the plan discussed today.

## 2025-07-15 ENCOUNTER — RESULTS FOLLOW-UP (OUTPATIENT)
Dept: INTERNAL MEDICINE | Facility: CLINIC | Age: OVER 89
End: 2025-07-15
Payer: MEDICARE

## 2025-07-15 DIAGNOSIS — M80.00XD AGE-RELATED OSTEOPOROSIS WITH CURRENT PATHOLOGICAL FRACTURE WITH ROUTINE HEALING, SUBSEQUENT ENCOUNTER: Primary | ICD-10-CM

## 2025-07-15 LAB
25(OH)D3+25(OH)D2 SERPL-MCNC: 27.9 NG/ML (ref 30–100)
ALBUMIN SERPL-MCNC: 4.1 G/DL (ref 3.5–5.2)
ALBUMIN/GLOB SERPL: 1.5 G/DL
ALP SERPL-CCNC: 108 U/L (ref 39–117)
ALT SERPL-CCNC: 9 U/L (ref 1–41)
AST SERPL-CCNC: 15 U/L (ref 1–40)
BILIRUB SERPL-MCNC: 0.4 MG/DL (ref 0–1.2)
BUN SERPL-MCNC: 15 MG/DL (ref 8–23)
BUN/CREAT SERPL: 16.1 (ref 7–25)
CALCIUM SERPL-MCNC: 9 MG/DL (ref 8.2–9.6)
CHLORIDE SERPL-SCNC: 100 MMOL/L (ref 98–107)
CO2 SERPL-SCNC: 25.2 MMOL/L (ref 22–29)
CREAT SERPL-MCNC: 0.93 MG/DL (ref 0.76–1.27)
CYSTATIN C SERPL-MCNC: 0.94 MG/L (ref 0.87–1.12)
EGFRCR SERPLBLD CKD-EPI 2021: 78 ML/MIN/1.73
GFR/BSA.PRED SERPLBLD CYS-BASED-ARV: 75 ML/MIN/1.73
GLOBULIN SER CALC-MCNC: 2.7 GM/DL
GLUCOSE SERPL-MCNC: 110 MG/DL (ref 65–99)
MAGNESIUM SERPL-MCNC: 2.4 MG/DL (ref 1.6–2.4)
PHOSPHATE SERPL-MCNC: 3.3 MG/DL (ref 2.5–4.5)
POTASSIUM SERPL-SCNC: 4.6 MMOL/L (ref 3.5–5.2)
PROT SERPL-MCNC: 6.8 G/DL (ref 6–8.5)
SODIUM SERPL-SCNC: 136 MMOL/L (ref 136–145)

## 2025-07-15 RX ORDER — ERGOCALCIFEROL 1.25 MG/1
50000 CAPSULE ORAL WEEKLY
Qty: 4 CAPSULE | Refills: 0 | Status: SHIPPED | OUTPATIENT
Start: 2025-07-15 | End: 2025-08-06

## 2025-07-15 RX ORDER — ZOLEDRONIC ACID 0.05 MG/ML
5 INJECTION, SOLUTION INTRAVENOUS ONCE
OUTPATIENT
Start: 2025-07-15

## 2025-07-15 RX ORDER — SODIUM CHLORIDE 9 MG/ML
20 INJECTION, SOLUTION INTRAVENOUS ONCE
OUTPATIENT
Start: 2025-07-15

## 2025-07-15 NOTE — PROGRESS NOTES
Subjective   History of Present Illness: Steffen Leyva is a 89 y.o. male who was recently seen in the hospital for a T12 compression fracture.  He elected to do conservative management with a brace.  He is here today for follow-up with x-rays.  He has been doing well posthospitalization and reports initial back pain has resolved where compression fracture was, he is having some low back musculoskeletal pain.  He recently started physical therapy.      The following portions of the patient's history were reviewed and updated as appropriate: allergies, current medications, past family history, past medical history, past social history, past surgical history, and problem list.      Past Medical History:   Diagnosis Date    Arthritis     Atrial fibrillation     Cataract     Corneal abrasion, left 07/23/2015    SEEN AT [facility]    COVID-19 09/2023    Elevated PSA 07/2015    Glaucoma     Hemorrhoids     Hypertension     IGT (impaired glucose tolerance) 07/2015    Osteoporosis     Prostate cancer 07/2019        Past Surgical History:   Procedure Laterality Date    COLONOSCOPY N/A 1994    NO RECORDS, OLD CHART NOT AVAILABLE, DR. DAHIANA CURTIS    COLONOSCOPY N/A 12/07/2005    RIGHT COLON LESION, PATH: TUBULAR ADENOMA, SIGMOID DIVERTICULOSIS, DR. JENNY XIE AT [facility]    COLONOSCOPY N/A 07/05/2018    Procedure: COLONOSCOPY to cecum with hot snare polypectomy with resolution clip x 1, and spot tattoo marker;  Surgeon: Cathi Coto MD;  Location: [facility] ENDOSCOPY;  Service: Gastroenterology    COLONOSCOPY N/A 07/10/2020    Procedure: COLONOSCOPY INTO CECUM AND NORMAL TI WITH BX;  Surgeon: Jose Francisco Rodriguez MD;  Location: [facility] ENDOSCOPY;  Service: Gastroenterology;  Laterality: N/A;  PRE: DIARRHEA   POST: DIVERTICULOSIS, INK AND SCAR-POST POLYPECTOMY SITE, HEMORRHOIDS     ENDOSCOPY N/A 07/10/2020    Procedure: ESOPHAGOGASTRODUODENOSCOPY WITH BX;  Surgeon: Jose Francisco Rodriguez MD;  Location: [facility] ENDOSCOPY;  Service:  Gastroenterology;  Laterality: N/A;  PRE: POSITIVE CELIAC ANTIBODIES   POST: NORMAL    EYE SURGERY Bilateral 2012    RELIEF OF ELEVATED INTRAOCULAR PRESSURE, PERFORMED IN [facility]    HEMORRHOID BANDING N/A 01/19/2006    DR. DAHIANA CURTIS    HEMORRHOID BANDING N/A 1980    NO RECORD AVAILABLE    KNEE ARTHROPLASTY Right 2013    D/T MENISCUS TEAR, DR. SANTACRUZ IN [facility] [place], [place]    KNEE ARTHROSCOPY Left 1980    REPLACEMENT TOTAL KNEE      TOTAL KNEE ARTHROPLASTY Right 03/2023          Current Outpatient Medications:     Eliquis 5 MG tablet tablet, TAKE 1 TABLET TWICE DAILY, Disp: 180 tablet, Rfl: 3    EPINEPHrine (EpiPen 2-Nathan) 0.3 MG/0.3ML solution auto-injector injection, Inject 0.3 mL into the appropriate muscle as directed by prescriber 1 (One) Time As Needed (anaphlyxis .) for up to 4 doses. IF NEEDED FOR ALLERGIC REACTION. GO TO ER IMMEDIATELY AFTER ANY USE., Disp: 2 each, Rfl: 1    ergocalciferol (ERGOCALCIFEROL) 1.25 MG (43591 UT) capsule, Take 1 capsule by mouth 1 (One) Time Per Week for 4 doses., Disp: 4 capsule, Rfl: 0    metoprolol succinate XL (TOPROL-XL) 25 MG 24 hr tablet, TAKE 1 TABLET EVERY DAY, Disp: 90 tablet, Rfl: 3    Probiotic Product (PROBIOTIC PO), Take 1 tablet by mouth Daily., Disp: , Rfl:     sodium chloride 1 g tablet, Take 1 tablet by mouth 2 (Two) Times a Day., Disp: 180 tablet, Rfl: 0    timolol (TIMOPTIC) 0.5 % ophthalmic solution, , Disp: , Rfl:   No current facility-administered medications for this visit.     Allergies   Allergen Reactions    Prednisolone Unknown - High Severity     High Eye pressure    Lactose GI Intolerance    Oxycodone-Acetaminophen Nausea And Vomiting        Social History     Socioeconomic History    Marital status:     Number of children: 5   Tobacco Use    Smoking status: Former     Current packs/day: 0.00     Average packs/day: 1 pack/day for 29.0 years (29.0 ttl pk-yrs)     Types: Cigarettes     Start date: 7/10/1956     Quit date:  "7/10/1985     Years since quittin.0    Smokeless tobacco: Never   Vaping Use    Vaping status: Never Used   Substance and Sexual Activity    Alcohol use: Yes     Alcohol/week: 7.0 standard drinks of alcohol     Types: 7 Standard drinks or equivalent per week    Drug use: Never        Family History   Problem Relation Age of Onset    Aortic aneurysm Mother     Glaucoma Mother     Aneurysm Mother     Heart disease Father 80    Heart disease Brother 60    Alcohol abuse Brother     No Known Problems Brother         Review of Systems   Musculoskeletal:  Positive for back pain. Negative for gait problem.   Neurological:  Negative for weakness and numbness.       Objective     Vitals:    25 1238   BP: 140/70   Pulse: 52   Temp: 97.7 °F (36.5 °C)   SpO2: 98%   Weight: 65.3 kg (144 lb)   Height: 172 cm (67.72\")     Body mass index is 22.08 kg/m².    Physical exam  Awake, alert, oriented x3  Pupils equal round reactive to light  Extraocular muscles intact  Face symmetric  Speech is fluent and clear  Motor exam  Bilateral deltoids 5/5, bilateral biceps 5/5, bilateral triceps 5/5, bilateral wrist extension 5/5 bilateral hand  5/5  Bilateral hip flexion 5/5, bilateral knee extension 5/5, bilateral DF/PF 5/5  No clonus  No Fadi's reflex  gait deferred  Able to detect  light touch in all 4 extremities      Assessment & Plan   Independent Review of Radiographic Studies:      I personally reviewed the images from the following studies.    X-ray thoracic/lumbar spine:    There is osteoporosis with considerable compression of the T12  vertebral body with anterior wedging measuring up to 40% and this shows  further compression when compared to the MRI scan of the lumbar spine  dated 2025. It also shows considerable worsening since the plain  films of the lumbar spine dated 2025. There are no other compression  fractures in the thoracic or lumbar spine.     There is mild disc space narrowing and spurring at " L1-2 and slight  anterior spurring at L3-4 and L4-5. No subluxation occurs during flexion  and extension in the lumbar spine. There is slight posterior subluxation  at L5-S1 measuring 6 mm which is unchanged during flexion and extension.      Medical Decision Making:      The patient is a 89-year-old male who is here for posthospitalization follow-up on T12 compression fracture.    He has been doing very well.  He has been compliant with his brace.  I have told him he does not need to wear the brace any longer if it is uncomfortable.  X-rays are stable.  His pain has completely resolved.  He is going today for an infusion of Reclast.  He can follow-up with us as needed.  I have answered all of his questions today.  His wife is here today.  He and she are in agreement with the above plan.    Diagnoses and all orders for this visit:    1. Compression fracture of T12 vertebra with routine healing, subsequent encounter (Primary)      Return if symptoms worsen or fail to improve.    I spent 40 minutes caring for Steffen Leyva on this date of service. This time includes time spent by me in the following activities: preparing for the visit, reviewing tests, obtaining and/or reviewing a separately obtained history, performing a medically appropriate examination and/or evaluation, counseling and educating the patient/family/caregiver, ordering medications, tests, or procedures, referring and communicating with other health care professionals, documenting information in the medical record, independently interpreting results and communicating that information with the patient/family/caregiver, and care coordination           Kerri Rivera  (RN)  2021 13:18:19 Kerri Rivera  (RN)  2021 13:18:51 Judson Mathews)  2021 10:54:13

## 2025-07-21 ENCOUNTER — PATIENT MESSAGE (OUTPATIENT)
Dept: INTERNAL MEDICINE | Facility: CLINIC | Age: OVER 89
End: 2025-07-21
Payer: MEDICARE

## 2025-07-21 DIAGNOSIS — I10 ESSENTIAL (PRIMARY) HYPERTENSION: Primary | ICD-10-CM

## 2025-07-23 ENCOUNTER — HOSPITAL ENCOUNTER (OUTPATIENT)
Dept: INFUSION THERAPY | Facility: HOSPITAL | Age: OVER 89
Discharge: HOME | End: 2025-07-23
Admitting: STUDENT IN AN ORGANIZED HEALTH CARE EDUCATION/TRAINING PROGRAM
Payer: MEDICARE

## 2025-07-23 ENCOUNTER — OFFICE VISIT (OUTPATIENT)
Dept: NEUROSURGERY | Facility: CLINIC | Age: OVER 89
End: 2025-07-23
Payer: MEDICARE

## 2025-07-23 VITALS
OXYGEN SATURATION: 100 % | TEMPERATURE: 96.9 F | SYSTOLIC BLOOD PRESSURE: 164 MMHG | HEART RATE: 55 BPM | BODY MASS INDEX: 22.08 KG/M2 | RESPIRATION RATE: 18 BRPM | DIASTOLIC BLOOD PRESSURE: 84 MMHG | WEIGHT: 144 LBS

## 2025-07-23 VITALS
HEART RATE: 52 BPM | OXYGEN SATURATION: 98 % | SYSTOLIC BLOOD PRESSURE: 140 MMHG | HEIGHT: 68 IN | DIASTOLIC BLOOD PRESSURE: 70 MMHG | TEMPERATURE: 97.7 F | WEIGHT: 144 LBS | BODY MASS INDEX: 21.82 KG/M2

## 2025-07-23 DIAGNOSIS — M80.00XD AGE-RELATED OSTEOPOROSIS WITH CURRENT PATHOLOGICAL FRACTURE WITH ROUTINE HEALING: Primary | ICD-10-CM

## 2025-07-23 DIAGNOSIS — S22.080D COMPRESSION FRACTURE OF T12 VERTEBRA WITH ROUTINE HEALING, SUBSEQUENT ENCOUNTER: Primary | ICD-10-CM

## 2025-07-23 LAB
ALBUMIN SERPL-MCNC: 3.8 G/DL (ref 3.5–5.2)
ANION GAP SERPL CALCULATED.3IONS-SCNC: 9.9 MMOL/L (ref 5–15)
BUN SERPL-MCNC: 14 MG/DL (ref 8–23)
BUN/CREAT SERPL: 14.9 (ref 7–25)
CALCIUM SPEC-SCNC: 8.7 MG/DL (ref 8.2–9.6)
CHLORIDE SERPL-SCNC: 99 MMOL/L (ref 98–107)
CO2 SERPL-SCNC: 24.1 MMOL/L (ref 22–29)
CREAT SERPL-MCNC: 0.94 MG/DL (ref 0.76–1.27)
EGFRCR SERPLBLD CKD-EPI 2021: 77 ML/MIN/1.73
GLUCOSE SERPL-MCNC: 153 MG/DL (ref 65–99)
PHOSPHATE SERPL-MCNC: 3.7 MG/DL (ref 2.5–4.5)
POTASSIUM SERPL-SCNC: 4.5 MMOL/L (ref 3.5–5.2)
SODIUM SERPL-SCNC: 133 MMOL/L (ref 136–145)

## 2025-07-23 PROCEDURE — 96374 THER/PROPH/DIAG INJ IV PUSH: CPT

## 2025-07-23 PROCEDURE — 80069 RENAL FUNCTION PANEL: CPT

## 2025-07-23 PROCEDURE — 25010000002 ZOLEDRONIC ACID 5 MG/100ML SOLUTION: Performed by: STUDENT IN AN ORGANIZED HEALTH CARE EDUCATION/TRAINING PROGRAM

## 2025-07-23 RX ORDER — ZOLEDRONIC ACID 0.05 MG/ML
5 INJECTION, SOLUTION INTRAVENOUS ONCE
Status: CANCELLED | OUTPATIENT
Start: 2025-07-23

## 2025-07-23 RX ORDER — ZOLEDRONIC ACID 0.05 MG/ML
5 INJECTION, SOLUTION INTRAVENOUS ONCE
Status: COMPLETED | OUTPATIENT
Start: 2025-07-23 | End: 2025-07-23

## 2025-07-23 RX ORDER — SODIUM CHLORIDE 9 MG/ML
20 INJECTION, SOLUTION INTRAVENOUS ONCE
Status: DISCONTINUED | OUTPATIENT
Start: 2025-07-23 | End: 2025-07-23

## 2025-07-23 RX ORDER — SODIUM CHLORIDE 9 MG/ML
20 INJECTION, SOLUTION INTRAVENOUS ONCE
Status: CANCELLED | OUTPATIENT
Start: 2025-07-23

## 2025-07-23 RX ADMIN — ZOLEDRONIC ACID 5 MG: 5 INJECTION, SOLUTION INTRAVENOUS at 14:10

## 2025-07-23 NOTE — PROGRESS NOTES
Lab order parameters met.   Reclast given per order.   Patient tolerated infusion without complaints post 30 minute infusion.  Patient ambulatory, D/C'd from [facility] with spouse.  Renal function panel ordered per Velma Kennedy NP. Obtained at this visit.

## (undated) DEVICE — RETRV ROTH NET PLAT UNIV

## (undated) DEVICE — Device: Brand: SPOT EX ENDOSCOPIC TATTOO

## (undated) DEVICE — SENSR O2 OXIMAX FNGR A/ 18IN NONSTR

## (undated) DEVICE — SNAR POLYP SENSATION STDOVL 27 240 BX40

## (undated) DEVICE — THE SINGLE USE ETRAP – POLYP TRAP IS USED FOR SUCTION RETRIEVAL OF ENDOSCOPICALLY REMOVED POLYPS.: Brand: ETRAP

## (undated) DEVICE — LN SMPL CO2 SHTRM SD STREAM W/M LUER

## (undated) DEVICE — KT ORCA ORCAPOD DISP STRL

## (undated) DEVICE — THE TORRENT IRRIGATION SCOPE CONNECTOR IS USED WITH THE TORRENT IRRIGATION TUBING TO PROVIDE IRRIGATION FLUIDS SUCH AS STERILE WATER DURING GASTROINTESTINAL ENDOSCOPIC PROCEDURES WHEN USED IN CONJUNCTION WITH AN IRRIGATION PUMP (OR ELECTROSURGICAL UNIT).: Brand: TORRENT

## (undated) DEVICE — ADAPT CLN BIOGUARD AIR/H2O DISP

## (undated) DEVICE — TUBING, SUCTION, 1/4" X 10', STRAIGHT: Brand: MEDLINE

## (undated) DEVICE — CANN O2 ETCO2 FITS ALL CONN CO2 SMPL A/ 7IN DISP LF

## (undated) DEVICE — CANNULA,ADULT,SOFT-TOUCH,7'TUBE,UC: Brand: PENDING

## (undated) DEVICE — Device: Brand: DEFENDO AIR/WATER/SUCTION AND BIOPSY VALVE

## (undated) DEVICE — BITEBLOCK OMNI BLOC

## (undated) DEVICE — FRCP BX RADJAW4 NDL 2.8 240CM LG OG BX40

## (undated) DEVICE — THE CARR-LOCKE INJECTION NEEDLE IS A SINGLE USE, DISPOSABLE, FLEXIBLE SHEATH INJECTION NEEDLE USED FOR THE INJECTION OF VARIOUS TYPES OF MEDIA THROUGH FLEXIBLE ENDOSCOPES.